# Patient Record
Sex: FEMALE | NOT HISPANIC OR LATINO | Employment: FULL TIME | URBAN - METROPOLITAN AREA
[De-identification: names, ages, dates, MRNs, and addresses within clinical notes are randomized per-mention and may not be internally consistent; named-entity substitution may affect disease eponyms.]

---

## 2021-05-04 ENCOUNTER — APPOINTMENT (OUTPATIENT)
Dept: URGENT CARE | Facility: CLINIC | Age: 41
End: 2021-05-04

## 2021-05-04 DIAGNOSIS — Z02.1 PHYSICAL EXAM, PRE-EMPLOYMENT: Primary | ICD-10-CM

## 2021-05-04 PROCEDURE — 86480 TB TEST CELL IMMUN MEASURE: CPT | Performed by: FAMILY MEDICINE

## 2021-05-06 LAB
GAMMA INTERFERON BACKGROUND BLD IA-ACNC: 0.02 IU/ML
M TB IFN-G BLD-IMP: NEGATIVE
M TB IFN-G CD4+ BCKGRND COR BLD-ACNC: 0 IU/ML
M TB IFN-G CD4+ BCKGRND COR BLD-ACNC: 0 IU/ML
MITOGEN IGNF BCKGRD COR BLD-ACNC: >10 IU/ML

## 2021-07-16 ENCOUNTER — OFFICE VISIT (OUTPATIENT)
Dept: FAMILY MEDICINE CLINIC | Facility: CLINIC | Age: 41
End: 2021-07-16
Payer: COMMERCIAL

## 2021-07-16 ENCOUNTER — APPOINTMENT (OUTPATIENT)
Dept: RADIOLOGY | Facility: CLINIC | Age: 41
End: 2021-07-16
Payer: COMMERCIAL

## 2021-07-16 VITALS
DIASTOLIC BLOOD PRESSURE: 80 MMHG | WEIGHT: 151.8 LBS | SYSTOLIC BLOOD PRESSURE: 96 MMHG | HEART RATE: 80 BPM | HEIGHT: 65 IN | TEMPERATURE: 98.9 F | BODY MASS INDEX: 25.29 KG/M2 | OXYGEN SATURATION: 99 %

## 2021-07-16 DIAGNOSIS — G89.29 CHRONIC LEFT SHOULDER PAIN: ICD-10-CM

## 2021-07-16 DIAGNOSIS — J30.2 SEASONAL ALLERGIES: ICD-10-CM

## 2021-07-16 DIAGNOSIS — M25.571 ACUTE RIGHT ANKLE PAIN: ICD-10-CM

## 2021-07-16 DIAGNOSIS — M25.512 CHRONIC LEFT SHOULDER PAIN: ICD-10-CM

## 2021-07-16 DIAGNOSIS — Z13.1 SCREENING FOR DIABETES MELLITUS: ICD-10-CM

## 2021-07-16 DIAGNOSIS — Z76.89 ENCOUNTER TO ESTABLISH CARE: ICD-10-CM

## 2021-07-16 DIAGNOSIS — M25.571 ACUTE RIGHT ANKLE PAIN: Primary | ICD-10-CM

## 2021-07-16 DIAGNOSIS — Z13.220 SCREENING FOR LIPID DISORDERS: ICD-10-CM

## 2021-07-16 PROCEDURE — 99203 OFFICE O/P NEW LOW 30 MIN: CPT | Performed by: NURSE PRACTITIONER

## 2021-07-16 PROCEDURE — 73030 X-RAY EXAM OF SHOULDER: CPT

## 2021-07-16 PROCEDURE — 73610 X-RAY EXAM OF ANKLE: CPT

## 2021-07-16 RX ORDER — IPRATROPIUM BROMIDE 21 UG/1
2 SPRAY, METERED NASAL EVERY 12 HOURS
COMMUNITY
End: 2021-07-16 | Stop reason: SDUPTHER

## 2021-07-16 RX ORDER — DIPHENHYDRAMINE HCL 25 MG
25 CAPSULE ORAL EVERY 6 HOURS PRN
COMMUNITY

## 2021-07-16 RX ORDER — IPRATROPIUM BROMIDE 21 UG/1
2 SPRAY, METERED NASAL EVERY 12 HOURS
Qty: 30 ML | Refills: 0 | Status: SHIPPED | OUTPATIENT
Start: 2021-07-16 | End: 2022-03-06 | Stop reason: SDUPTHER

## 2021-07-16 RX ORDER — IBUPROFEN 200 MG
TABLET ORAL EVERY 6 HOURS PRN
COMMUNITY

## 2021-07-19 NOTE — PROGRESS NOTES
[facility]    ASSESSMENT AND PLAN     1  Acute right ankle pain   patient with notable pain, along lateral aspect of right foot   No pain in the malleolus  Range of motion intact  Recommend referral/ evaluation with Podiatry  Will obtain x-ray prior, to rule out any structural concern  Note patient has been worked up by Rheumatology in [place] for various symptoms including joint pains  Discussed establishing here  Note mother was diagnosed with rheumatoid arthritis 1 year ago  Patient prefers to hold off at this time  Re-evaluate at next follow-up  - XR ankle 3+ vw right; Future  - Ambulatory referral to Podiatry; Future    2  Chronic left shoulder pain   with chronic nature of her symptoms, will assess with x-ray  Discussed would likely benefit from PT  Follow-up with ortho if indicated    - XR shoulder 2+ vw left; Future    3  Seasonal allergies    - ipratropium (ATROVENT) 0 03 % nasal spray; 2 sprays into each nostril every 12 (twelve) hours  Dispense: 30 mL; Refill: 0    4  Screening for lipid disorders   establish baseline    - Lipid panel; Future    5  Screening for diabetes mellitus    - Comprehensive metabolic panel; Future    6  Encounter to establish care  Referrals to establish with Gyn and Dermatology    - Ambulatory referral to Gynecology; Future  - Ambulatory referral to Dermatology; Future            SUBJECTIVE       Patient ID: Alivia May is a 47 y o  female  Chief Complaint   Patient presents with    Ankle Pain     right ankle/foot pain x 3 weeks    Shoulder Pain     left shoulder pain x 6 months       HISTORY OF PRESENT ILLNESS      Patient presents today to establish primary care in our practice  Transitioning from [facility] in [place]  She is currently an RN within our network, 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  Complains today of chronic left shoulder pain  Notes it for roughly 6 months  No known recent injury, but she does have a history of clavicle surgery in 2000 and again 2001, secondary to traumatic injury  Shoulder has been progressively worsening over the last few months  Believes she may have aggravated it when reaching for something behind her  Pain is primarily within the joint itself, and will occasionally radiate down the medial biceps  Notes decrease in range of motion  Second complaint today is right ankle pain  This is acute and has been present for roughly 3 weeks  Noted it after excessive walking while on vacation with her family  On the board walk at the [facility]  She normally does wear a good support shoe, but was wearing flip-flops the majority of her vacation  She has had plantar fasciitis in the past  This is different  Current pain is along the lateral aspect of her foot, and travels back up to the Achilles  Notes pain when applying pressure of walking  It is persistent  There is no change regardless of shoe (with/without support)  of note, she does have a history of joint pain  Recently worked up for bilateral hand/ wrist pain at [facility]  She has followed with a rheumatologist in the past for an elevated JANAK, but otherwise lab work has been unremarkable          The following portions of the patient's history were reviewed and updated as appropriate: allergies, current medications, past family history, past medical history, past social history, past surgical history and problem list     REVIEW OF SYSTEMS  Review of Systems   Constitutional: Negative  Respiratory: Negative  Cardiovascular: Negative  Musculoskeletal: Positive for arthralgias  Left shoulder and right foot as in HPI   Psychiatric/Behavioral: Negative          OBJECTIVE      VITAL SIGNS  BP 96/80 (BP Location: Left arm, Patient Position: Sitting, Cuff Size: Adult)   Pulse 80   Temp 98 9 °F (37 2 °C)   Ht 5' 5" (1 651 m)   Wt 68 9 kg (151 lb 12 8 oz)   SpO2 99%   BMI 25 26 kg/m² CURRENT MEDICATIONS    Current Outpatient Medications:     diphenhydrAMINE (BENADRYL) 25 mg capsule, Take 25 mg by mouth every 6 (six) hours as needed for itching, Disp: , Rfl:     ibuprofen (MOTRIN) 200 mg tablet, Take by mouth every 6 (six) hours as needed for mild pain, Disp: , Rfl:     ipratropium (ATROVENT) 0 03 % nasal spray, 2 sprays into each nostril every 12 (twelve) hours, Disp: 30 mL, Rfl: 0      PHYSICAL EXAMINATION   Physical Exam  Vitals and nursing note reviewed  Constitutional:       General: She is not in acute distress  Appearance: Normal appearance  She is not ill-appearing  Cardiovascular:      Rate and Rhythm: Normal rate and regular rhythm  Heart sounds: Normal heart sounds  Pulmonary:      Effort: Pulmonary effort is normal  No respiratory distress  Breath sounds: Normal breath sounds and air entry  Musculoskeletal:      Left shoulder: Tenderness and bony tenderness present  No swelling, deformity or effusion  Decreased range of motion  Normal pulse  Right lower leg: No edema  Left lower leg: No edema  Right foot: Normal range of motion and normal capillary refill  Swelling, tenderness (along lateral aspect) and bony tenderness present  Normal pulse  Skin:     General: Skin is warm and dry  Neurological:      General: No focal deficit present  Mental Status: She is alert and oriented to person, place, and time  Psychiatric:         Attention and Perception: Attention normal          Mood and Affect: Mood and affect normal          Speech: Speech normal          Behavior: Behavior normal          Thought Content:  Thought content normal

## 2021-09-01 ENCOUNTER — APPOINTMENT (OUTPATIENT)
Dept: LAB | Facility: HOSPITAL | Age: 41
End: 2021-09-01
Payer: COMMERCIAL

## 2021-09-01 ENCOUNTER — APPOINTMENT (OUTPATIENT)
Dept: LAB | Facility: HOSPITAL | Age: 41
End: 2021-09-01

## 2021-09-01 DIAGNOSIS — Z13.1 SCREENING FOR DIABETES MELLITUS: ICD-10-CM

## 2021-09-01 DIAGNOSIS — Z13.220 SCREENING FOR LIPID DISORDERS: ICD-10-CM

## 2021-09-01 DIAGNOSIS — Z00.8 HEALTH EXAMINATION IN POPULATION SURVEY: ICD-10-CM

## 2021-09-01 LAB
ALBUMIN SERPL BCP-MCNC: 3.9 G/DL (ref 3.5–5)
ALP SERPL-CCNC: 45 U/L (ref 46–116)
ALT SERPL W P-5'-P-CCNC: 17 U/L (ref 12–78)
ANION GAP SERPL CALCULATED.3IONS-SCNC: 1 MMOL/L (ref 4–13)
AST SERPL W P-5'-P-CCNC: 11 U/L (ref 5–45)
BILIRUB SERPL-MCNC: 0.53 MG/DL (ref 0.2–1)
BUN SERPL-MCNC: 9 MG/DL (ref 5–25)
CALCIUM SERPL-MCNC: 8.7 MG/DL (ref 8.3–10.1)
CHLORIDE SERPL-SCNC: 108 MMOL/L (ref 100–108)
CHOLEST SERPL-MCNC: 184 MG/DL (ref 50–200)
CO2 SERPL-SCNC: 30 MMOL/L (ref 21–32)
CREAT SERPL-MCNC: 0.61 MG/DL (ref 0.6–1.3)
EST. AVERAGE GLUCOSE BLD GHB EST-MCNC: 100 MG/DL
GFR SERPL CREATININE-BSD FRML MDRD: 115 ML/MIN/1.73SQ M
GLUCOSE P FAST SERPL-MCNC: 90 MG/DL (ref 65–99)
HBA1C MFR BLD: 5.1 %
HDLC SERPL-MCNC: 88 MG/DL
LDLC SERPL CALC-MCNC: 84 MG/DL (ref 0–100)
NONHDLC SERPL-MCNC: 96 MG/DL
POTASSIUM SERPL-SCNC: 4.2 MMOL/L (ref 3.5–5.3)
PROT SERPL-MCNC: 7.4 G/DL (ref 6.4–8.2)
SODIUM SERPL-SCNC: 139 MMOL/L (ref 136–145)
TRIGL SERPL-MCNC: 58 MG/DL

## 2021-09-01 PROCEDURE — 83036 HEMOGLOBIN GLYCOSYLATED A1C: CPT

## 2021-09-01 PROCEDURE — 80061 LIPID PANEL: CPT

## 2021-09-01 PROCEDURE — 36415 COLL VENOUS BLD VENIPUNCTURE: CPT

## 2021-09-01 PROCEDURE — 80053 COMPREHEN METABOLIC PANEL: CPT

## 2021-09-15 ENCOUNTER — EVALUATION (OUTPATIENT)
Dept: PHYSICAL THERAPY | Facility: CLINIC | Age: 41
End: 2021-09-15
Payer: COMMERCIAL

## 2021-09-15 ENCOUNTER — TELEPHONE (OUTPATIENT)
Dept: FAMILY MEDICINE CLINIC | Facility: CLINIC | Age: 41
End: 2021-09-15

## 2021-09-15 DIAGNOSIS — M25.512 CHRONIC LEFT SHOULDER PAIN: Primary | ICD-10-CM

## 2021-09-15 DIAGNOSIS — G89.29 CHRONIC LEFT SHOULDER PAIN: Primary | ICD-10-CM

## 2021-09-15 PROCEDURE — 97161 PT EVAL LOW COMPLEX 20 MIN: CPT | Performed by: PHYSICAL THERAPIST

## 2021-09-15 NOTE — PROGRESS NOTES
PT Evaluation     Today's date: 9/15/2021  Patient name: Dunia Adame  : 1980  MRN: [id number]  Referring provider: SWETA Locke  Dx:   Encounter Diagnosis     ICD-10-CM    1  Chronic left shoulder pain  M25 512     G89 29                   Assessment  Assessment details: Dunia Adame is a 47 y o  female referred to outpatient physical therapy for chronic L shoulder pain  Impairments include pain, decreased L shoulder ROM, GH capsular tightness, hypertonic infraspinatus, and decreased LUE strength  These impairments are limiting patients functional ability to perform lifting and reaching behind her back  Pt is restricted in participating in ADLs, and picking up and holding her children  Pt would benefit from skilled physical therapy to address the limitations above to allow return to prior level of function  At this point in time, no further referral necessary based upon examination results  Impairments: abnormal or restricted ROM, abnormal movement, activity intolerance, impaired physical strength, lacks appropriate home exercise program and pain with function    Symptom irritability: lowUnderstanding of Dx/Px/POC: good  Goals  Short term goals 2-3 weeks    1) Patient will demonstrate ability to perform HEP  2) Patient will improve LUE strength by 1/2 MMT  3) Patient will improve pain at worst to 3/10 on NPRS  Long term goals 4-8 weeks    1) Patient will demonstrate independence with comprehensive HEP  2) Patient improve FOTO score to equal or greater than expected values  3) Patient will demonstrate ability to  and hold her children without increase in pain  4) Patient will demonstrate ability to perform 5 pushups without increase in pain  5) Patient will decrease pain at worst to 0/10 to improve quality of life  Plan  Plan details: Plan of care was discussed with patient at time of evaluation  Pt will be seen 1-2x a week for 8 weeks     Patient would benefit from: skilled physical therapy  Planned therapy interventions: flexibility, functional ROM exercises, home exercise program, therapeutic exercise, therapeutic activities, stretching, strengthening, patient education, neuromuscular re-education, manual therapy and joint mobilization  Frequency: 2x week (1-2x)  Duration in weeks: 8  Treatment plan discussed with: patient        Subjective Evaluation    History of Present Illness  Mechanism of injury: Patient presents to outpatient physical therapy with chief complaint of L shoulder pain  Seven months she was reaching back and felt a pop or stretch and then got pain in the L shoulder  Pt reports pain in the shoulder once a week  Two previous L clavicle surgeries in  and   Patient Denies episodes of numbness or tingling in LUE  Pain described as sharp and radiates in L shoulder down to middle of sanchez that happens with movement  After the sharp pain the pain turns into burning pain that will last for 10 minutes  Pain rating currently 0/10, at best 0/10 and at worst 9/10  Pt reports she feels less muscle strength in the LUE  Patient states limitations with shoulder movement, reaching behind her bed, holding her children, lifting  Unable to do push ups  Pt is currently a [employer] and works full time; no difficulty with occupational tasks  Aggravating factors include reaching behind her back  Patient states use of rest for relief of symptoms  Pt goals for therapy include getting back to normal and improved strength  Quality of life: good    Pain  Current pain ratin  At best pain ratin  At worst pain ratin  Quality: sharp  Relieving factors: rest  Aggravating factors: lifting (reaching behind the back )  Progression: no change    Social Support    Employment status: working  Hand dominance: right      Diagnostic Tests    FCE comments: L shoulder XRAY on 21: There is no acute fracture or dislocation    There are postoperative changes of mid left clavicular plate and screw fixation for fracture repair      No significant degenerative changes      No lytic or blastic osseous lesion      Soft tissues are unremarkable    Treatments  No previous or current treatments  Patient Goals  Patient goals for therapy: decreased pain, increased motion, increased strength, independence with ADLs/IADLs and return to sport/leisure activities          Objective     Palpation   Left   Hypertonic in the infraspinatus, latissimus and upper trapezius  Tenderness of the infraspinatus and upper trapezius  Trigger point to infraspinatus and upper trapezius  Neurological Testing     Sensation   Cervical/Thoracic   Left   Intact: light touch    Right   Intact: light touch    Active Range of Motion   Cervical/Thoracic Spine       Cervical    Flexion:  WFL  Extension:  WFL  Left lateral flexion:  WFL  Right lateral flexion:  WFL  Left rotation:  WFL  Right rotation:  WFL  Left Shoulder   Flexion: 150 degrees   Abduction: WFL  External rotation 90°: 50 degrees   Internal rotation 90°: 80 degrees   Internal rotation BTB: T5     Right Shoulder   Normal active range of motion    Passive Range of Motion   Left Shoulder   External rotation 90°: 50 degrees   Internal rotation 90°: 80 degrees     Joint Play   Left Shoulder  Hypomobile in the anterior capsule, posterior capsule and inferior capsule    Joints within functional limits: C3, C4, C5, C6, C7, T1, T2, T3, T4, T5, T6, T7 and T8     Strength/Myotome Testing     Left Shoulder     Planes of Motion   Flexion: 4   Extension: 4+   Abduction: 4   External rotation at 0°: 4-   Internal rotation at 0°: 4     Isolated Muscles   Serratus anterior: 4+   Upper trapezius: 5     Right Shoulder     Planes of Motion   Flexion: 4+   Extension: 4+   Abduction: 4+   External rotation at 0°: 4   Internal rotation at 0°: 4     Isolated Muscles   Serratus anterior: 4+   Upper trapezius: 5     Left Elbow   Flexion: 4+  Extension: 4+    Right Elbow   Flexion: 4+  Extension: 4+    Left Wrist/Hand   Wrist extension: 4  Wrist flexion: 4  Thumb extension: 4+    Right Wrist/Hand   Wrist extension: 4  Wrist flexion: 4  Thumb extension: 4+    Tests   Cervical   Negative vertical compression  Left   Negative Spurling's Test A  Right   Negative Spurling's Test A  Left Shoulder   Positive Hawkin's and ULTT1  Negative belly press, crossover, drop arm, empty can, external rotation lag sign, full can, Neer's, painful arc, Speed's, ULTT3 and ULTT4  Lumbar   Negative vertical compression  Precautions: N/A      Manuals 9/15            L posterior capsule stretch  AG            L PROM             L P/A mobilizations             L median nerve glides             L infraspinatus STM             Neuro Re-Ed                                                                                                        Ther Ex             Pulleys flexion             L Sleeper stretch IR/ER  2x30" ea   HEP            SL ER  x10 HEP            Supine ER AAROM 5x10" HEP            Supine flexion AAROM             Table slides             ER on PB             Serratus anterior w/ ER             Scapular retractions w/ ER             Median nerve glides L             Ball circles                          Ther Activity                                       Gait Training                                       Modalities

## 2021-09-15 NOTE — TELEPHONE ENCOUNTER
Gareth from [facility] called  Asked if you would please place a referral for Roberta Warner  She is coming in [facility] to have L shoulder evaluated  She has chronic L shoulder pain

## 2021-09-21 ENCOUNTER — OFFICE VISIT (OUTPATIENT)
Dept: OBGYN CLINIC | Facility: CLINIC | Age: 41
End: 2021-09-21
Payer: COMMERCIAL

## 2021-09-21 VITALS
SYSTOLIC BLOOD PRESSURE: 120 MMHG | WEIGHT: 151.2 LBS | BODY MASS INDEX: 25.19 KG/M2 | DIASTOLIC BLOOD PRESSURE: 70 MMHG | HEIGHT: 65 IN

## 2021-09-21 DIAGNOSIS — Z01.419 ENCOUNTER FOR WELL WOMAN EXAM WITH ROUTINE GYNECOLOGICAL EXAM: Primary | ICD-10-CM

## 2021-09-21 DIAGNOSIS — Z12.31 ENCOUNTER FOR SCREENING MAMMOGRAM FOR MALIGNANT NEOPLASM OF BREAST: ICD-10-CM

## 2021-09-21 DIAGNOSIS — Z76.89 ENCOUNTER TO ESTABLISH CARE: ICD-10-CM

## 2021-09-21 PROBLEM — Z90.79 STATUS POST BILATERAL SALPINGECTOMY: Status: ACTIVE | Noted: 2018-04-18

## 2021-09-21 PROBLEM — Z98.891 HISTORY OF UTERINE SCAR FROM PREVIOUS SURGERY: Status: ACTIVE | Noted: 2017-09-18

## 2021-09-21 PROCEDURE — 99385 PREV VISIT NEW AGE 18-39: CPT | Performed by: OBSTETRICS & GYNECOLOGY

## 2021-09-21 PROCEDURE — G0145 SCR C/V CYTO,THINLAYER,RESCR: HCPCS | Performed by: OBSTETRICS & GYNECOLOGY

## 2021-09-21 NOTE — PROGRESS NOTES
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, history of HPV  - Contraceptive counseling discussed  Current contraception: BS done with prior CS     Additional problems addressed during this visit:  1  Encounter for well woman exam with routine gynecological exam    2  Encounter to establish care  -     Ambulatory referral to Gynecology      CC:  Annual Gynecologic Examination    HPI: Samantha Craft is a 46 y o  [id number] who presents for annual gynecologic examination  HPI  She reports no new changes to her health  She reports no breast concerns  Mother had history of breast cancer, diagnosed at age 36, has been getting annual mammos since age 30  She gets regular periods  She has no vaginal discharge, vulvar or vaginal lesions, pelvic pain, or abnormal bleeding  She has no sexual health concerns and is currently sexually active with one male partner  The following portions of the patient's history were reviewed and updated as appropriate: allergies, current medications, past family history, past medical history, obstetric history, gynecologic history, past social history, past surgical history and problem list     Review of Systems   Constitutional: Negative  HENT: Negative  Eyes: Negative  Respiratory: Negative  Cardiovascular: Negative  Gastrointestinal: Negative  Genitourinary: Negative  Musculoskeletal: Negative  All other systems reviewed and are negative  Objective:  /70   Ht 5' 5" (1 651 m)   Wt 68 6 kg (151 lb 3 2 oz)   LMP 09/14/2021   Breastfeeding No   BMI 25 16 kg/m²    Physical Exam  Vitals reviewed     Constitutional:       General: She is not in acute distress  Appearance: She is well-developed  HENT:      Head: Normocephalic and atraumatic  Nose: Nose normal    Cardiovascular:      Rate and Rhythm: Normal rate  Pulmonary:      Effort: Pulmonary effort is normal  No respiratory distress  Chest:      Breasts: Breasts are symmetrical          Right: Normal  No mass, nipple discharge, skin change or tenderness  Left: Normal  No mass, nipple discharge, skin change or tenderness  Abdominal:      General: There is no distension  Palpations: Abdomen is soft  There is no mass  Tenderness: There is no abdominal tenderness  There is no guarding or rebound  Genitourinary:     General: Normal vulva  Exam position: Lithotomy position  Labia:         Right: No lesion  Left: No lesion  Urethra: No prolapse (urethral meatus normal)  Vagina: Normal  No vaginal discharge, erythema or bleeding  Cervix: Normal       Uterus: Normal        Adnexa: Right adnexa normal and left adnexa normal    Musculoskeletal:         General: Normal range of motion  Cervical back: Normal range of motion  Lymphadenopathy:      Upper Body:      Right upper body: No supraclavicular, axillary or pectoral adenopathy  Left upper body: No supraclavicular, axillary or pectoral adenopathy  Lower Body: No right inguinal adenopathy  No left inguinal adenopathy  Skin:     General: Skin is warm and dry  Neurological:      Mental Status: She is alert and oriented to person, place, and time  Psychiatric:         Behavior: Behavior normal          Thought Content:  Thought content normal          Judgment: Judgment normal

## 2021-09-23 ENCOUNTER — OFFICE VISIT (OUTPATIENT)
Dept: PHYSICAL THERAPY | Facility: CLINIC | Age: 41
End: 2021-09-23
Payer: COMMERCIAL

## 2021-09-23 DIAGNOSIS — M25.512 CHRONIC LEFT SHOULDER PAIN: Primary | ICD-10-CM

## 2021-09-23 DIAGNOSIS — G89.29 CHRONIC LEFT SHOULDER PAIN: Primary | ICD-10-CM

## 2021-09-23 PROCEDURE — 97140 MANUAL THERAPY 1/> REGIONS: CPT | Performed by: PHYSICAL THERAPIST

## 2021-09-23 PROCEDURE — 97110 THERAPEUTIC EXERCISES: CPT | Performed by: PHYSICAL THERAPIST

## 2021-09-23 NOTE — PROGRESS NOTES
Daily Note     Today's date: 2021  Patient name: Milan Cantu  : 1980  MRN: [id number]  Referring provider: SWETA Nava  Dx:   Encounter Diagnosis     ICD-10-CM    1  Chronic left shoulder pain  M25 512     G89 29                   Subjective: Pt reports her shoulder continues to feel the same since initial evaluation  States she was not able to do her exercises as much as she was hoping at home due to the kids  Objective: See treatment diary below  L shoulder ER PROM: 65*    Assessment: Demonstrates significant improvements in L shoulder PROM specifically into ER>IR as shown above  Numbness and tingling present at end ranges of manual stretching  Demonstrates neural tension in median nerve however increased ROM before symptom provocation by end of 3rd set  Good tolerance to additional exercises this session without symptom provocation  HEP reviewed and all questions answered at this time  Pt educated to maintain compliance with HEP  Plan: Continue per plan of care  Precautions: N/A      Manuals 9/15 9/23           L posterior capsule stretch  AG 3x30"           L PROM  AG           L P/A mobilizations             L median nerve glides  3x10            L infraspinatus STM             Neuro Re-Ed                                                                                                        Ther Ex             Pulleys flexion  3'            L Sleeper stretch IR/ER  2x30" ea  HEP IR/ER  2x30" ea              SL ER  x10 HEP 2x10  1#          Supine ER AAROM 5x10" HEP 5x10"           Supine flexion AAROM             Table slides             ER on PB             Serratus anterior w/ ER  OTB  x10           Scapular retractions w/ ER  PTB  x10           Median nerve glides L  x10           Ball circles                          Ther Activity                                       Gait Training                                       Modalities

## 2021-09-28 LAB
LAB AP GYN PRIMARY INTERPRETATION: NORMAL
Lab: NORMAL

## 2021-09-29 ENCOUNTER — APPOINTMENT (OUTPATIENT)
Dept: PHYSICAL THERAPY | Facility: CLINIC | Age: 41
End: 2021-09-29
Payer: COMMERCIAL

## 2021-10-06 ENCOUNTER — APPOINTMENT (OUTPATIENT)
Dept: PHYSICAL THERAPY | Facility: CLINIC | Age: 41
End: 2021-10-06
Payer: COMMERCIAL

## 2021-10-07 ENCOUNTER — NURSE TRIAGE (OUTPATIENT)
Dept: OTHER | Facility: OTHER | Age: 41
End: 2021-10-07

## 2021-10-07 DIAGNOSIS — Z20.828 SARS-ASSOCIATED CORONAVIRUS EXPOSURE: Primary | ICD-10-CM

## 2021-10-07 PROCEDURE — 87635 SARS-COV-2 COVID-19 AMP PRB: CPT | Performed by: NURSE PRACTITIONER

## 2021-10-11 ENCOUNTER — TELEPHONE (OUTPATIENT)
Dept: FAMILY MEDICINE CLINIC | Facility: CLINIC | Age: 41
End: 2021-10-11

## 2021-10-14 ENCOUNTER — APPOINTMENT (OUTPATIENT)
Dept: PHYSICAL THERAPY | Facility: CLINIC | Age: 41
End: 2021-10-14
Payer: COMMERCIAL

## 2021-10-20 ENCOUNTER — OFFICE VISIT (OUTPATIENT)
Dept: PHYSICAL THERAPY | Facility: CLINIC | Age: 41
End: 2021-10-20
Payer: COMMERCIAL

## 2021-10-20 DIAGNOSIS — G89.29 CHRONIC LEFT SHOULDER PAIN: Primary | ICD-10-CM

## 2021-10-20 DIAGNOSIS — M25.512 CHRONIC LEFT SHOULDER PAIN: Primary | ICD-10-CM

## 2021-10-20 PROCEDURE — 97110 THERAPEUTIC EXERCISES: CPT | Performed by: PHYSICAL THERAPIST

## 2021-10-20 PROCEDURE — 97140 MANUAL THERAPY 1/> REGIONS: CPT | Performed by: PHYSICAL THERAPIST

## 2021-10-28 ENCOUNTER — OFFICE VISIT (OUTPATIENT)
Dept: PHYSICAL THERAPY | Facility: CLINIC | Age: 41
End: 2021-10-28
Payer: COMMERCIAL

## 2021-10-28 DIAGNOSIS — G89.29 CHRONIC LEFT SHOULDER PAIN: Primary | ICD-10-CM

## 2021-10-28 DIAGNOSIS — M25.512 CHRONIC LEFT SHOULDER PAIN: Primary | ICD-10-CM

## 2021-10-28 PROCEDURE — 97110 THERAPEUTIC EXERCISES: CPT

## 2021-10-28 PROCEDURE — 97140 MANUAL THERAPY 1/> REGIONS: CPT

## 2021-11-01 ENCOUNTER — OFFICE VISIT (OUTPATIENT)
Dept: OBGYN CLINIC | Facility: HOSPITAL | Age: 41
End: 2021-11-01
Payer: COMMERCIAL

## 2021-11-01 VITALS
HEART RATE: 69 BPM | SYSTOLIC BLOOD PRESSURE: 118 MMHG | WEIGHT: 148.6 LBS | BODY MASS INDEX: 24.76 KG/M2 | DIASTOLIC BLOOD PRESSURE: 79 MMHG | HEIGHT: 65 IN

## 2021-11-01 DIAGNOSIS — M79.641 PAIN IN BOTH HANDS: ICD-10-CM

## 2021-11-01 DIAGNOSIS — M18.0 OSTEOARTHRITIS OF CARPOMETACARPAL (CMC) JOINTS OF BOTH THUMBS, UNSPECIFIED OSTEOARTHRITIS TYPE: Primary | ICD-10-CM

## 2021-11-01 DIAGNOSIS — M79.642 PAIN IN BOTH HANDS: ICD-10-CM

## 2021-11-01 DIAGNOSIS — M18.0 PRIMARY OSTEOARTHRITIS OF BOTH FIRST CARPOMETACARPAL JOINTS: ICD-10-CM

## 2021-11-01 PROCEDURE — 99203 OFFICE O/P NEW LOW 30 MIN: CPT | Performed by: PHYSICIAN ASSISTANT

## 2021-11-03 ENCOUNTER — OFFICE VISIT (OUTPATIENT)
Dept: PHYSICAL THERAPY | Facility: CLINIC | Age: 41
End: 2021-11-03
Payer: COMMERCIAL

## 2021-11-03 DIAGNOSIS — M25.512 CHRONIC LEFT SHOULDER PAIN: Primary | ICD-10-CM

## 2021-11-03 DIAGNOSIS — G89.29 CHRONIC LEFT SHOULDER PAIN: Primary | ICD-10-CM

## 2021-11-03 PROCEDURE — 97110 THERAPEUTIC EXERCISES: CPT

## 2021-11-03 PROCEDURE — 97140 MANUAL THERAPY 1/> REGIONS: CPT

## 2021-11-04 ENCOUNTER — OFFICE VISIT (OUTPATIENT)
Dept: PHYSICAL THERAPY | Facility: CLINIC | Age: 41
End: 2021-11-04
Payer: COMMERCIAL

## 2021-11-04 DIAGNOSIS — G89.29 CHRONIC LEFT SHOULDER PAIN: Primary | ICD-10-CM

## 2021-11-04 DIAGNOSIS — M25.512 CHRONIC LEFT SHOULDER PAIN: Primary | ICD-10-CM

## 2021-11-04 PROCEDURE — 97110 THERAPEUTIC EXERCISES: CPT | Performed by: PHYSICAL THERAPIST

## 2021-11-04 PROCEDURE — 97140 MANUAL THERAPY 1/> REGIONS: CPT | Performed by: PHYSICAL THERAPIST

## 2021-11-15 ENCOUNTER — OFFICE VISIT (OUTPATIENT)
Dept: PHYSICAL THERAPY | Facility: CLINIC | Age: 41
End: 2021-11-15
Payer: COMMERCIAL

## 2021-11-15 DIAGNOSIS — M25.512 CHRONIC LEFT SHOULDER PAIN: Primary | ICD-10-CM

## 2021-11-15 DIAGNOSIS — G89.29 CHRONIC LEFT SHOULDER PAIN: Primary | ICD-10-CM

## 2021-11-15 PROCEDURE — 97140 MANUAL THERAPY 1/> REGIONS: CPT

## 2021-11-15 PROCEDURE — 97110 THERAPEUTIC EXERCISES: CPT

## 2021-11-17 ENCOUNTER — APPOINTMENT (OUTPATIENT)
Dept: PHYSICAL THERAPY | Facility: CLINIC | Age: 41
End: 2021-11-17
Payer: COMMERCIAL

## 2021-11-18 ENCOUNTER — OFFICE VISIT (OUTPATIENT)
Dept: PHYSICAL THERAPY | Facility: CLINIC | Age: 41
End: 2021-11-18
Payer: COMMERCIAL

## 2021-11-18 DIAGNOSIS — G89.29 CHRONIC LEFT SHOULDER PAIN: Primary | ICD-10-CM

## 2021-11-18 DIAGNOSIS — M25.512 CHRONIC LEFT SHOULDER PAIN: Primary | ICD-10-CM

## 2021-11-18 PROCEDURE — 97140 MANUAL THERAPY 1/> REGIONS: CPT | Performed by: PHYSICAL THERAPIST

## 2021-11-18 PROCEDURE — 97110 THERAPEUTIC EXERCISES: CPT | Performed by: PHYSICAL THERAPIST

## 2021-11-22 ENCOUNTER — OFFICE VISIT (OUTPATIENT)
Dept: PHYSICAL THERAPY | Facility: CLINIC | Age: 41
End: 2021-11-22
Payer: COMMERCIAL

## 2021-11-22 DIAGNOSIS — G89.29 CHRONIC LEFT SHOULDER PAIN: Primary | ICD-10-CM

## 2021-11-22 DIAGNOSIS — M25.512 CHRONIC LEFT SHOULDER PAIN: Primary | ICD-10-CM

## 2021-11-22 PROCEDURE — 97110 THERAPEUTIC EXERCISES: CPT | Performed by: PHYSICAL THERAPIST

## 2021-11-22 PROCEDURE — 97140 MANUAL THERAPY 1/> REGIONS: CPT | Performed by: PHYSICAL THERAPIST

## 2021-11-24 ENCOUNTER — OFFICE VISIT (OUTPATIENT)
Dept: PHYSICAL THERAPY | Facility: CLINIC | Age: 41
End: 2021-11-24
Payer: COMMERCIAL

## 2021-11-24 DIAGNOSIS — G89.29 CHRONIC LEFT SHOULDER PAIN: Primary | ICD-10-CM

## 2021-11-24 DIAGNOSIS — M25.512 CHRONIC LEFT SHOULDER PAIN: Primary | ICD-10-CM

## 2021-11-24 PROCEDURE — 97110 THERAPEUTIC EXERCISES: CPT | Performed by: PHYSICAL THERAPIST

## 2021-11-24 PROCEDURE — 97140 MANUAL THERAPY 1/> REGIONS: CPT | Performed by: PHYSICAL THERAPIST

## 2021-11-24 PROCEDURE — 97112 NEUROMUSCULAR REEDUCATION: CPT | Performed by: PHYSICAL THERAPIST

## 2021-11-27 ENCOUNTER — TELEPHONE (OUTPATIENT)
Dept: FAMILY MEDICINE CLINIC | Facility: CLINIC | Age: 41
End: 2021-11-27

## 2021-11-29 ENCOUNTER — OFFICE VISIT (OUTPATIENT)
Dept: PHYSICAL THERAPY | Facility: CLINIC | Age: 41
End: 2021-11-29
Payer: COMMERCIAL

## 2021-11-29 DIAGNOSIS — G89.29 CHRONIC LEFT SHOULDER PAIN: Primary | ICD-10-CM

## 2021-11-29 DIAGNOSIS — M25.512 CHRONIC LEFT SHOULDER PAIN: Primary | ICD-10-CM

## 2021-11-29 PROCEDURE — 97112 NEUROMUSCULAR REEDUCATION: CPT | Performed by: PHYSICAL THERAPIST

## 2021-11-29 PROCEDURE — 97140 MANUAL THERAPY 1/> REGIONS: CPT | Performed by: PHYSICAL THERAPIST

## 2021-11-29 PROCEDURE — 97110 THERAPEUTIC EXERCISES: CPT | Performed by: PHYSICAL THERAPIST

## 2021-12-01 ENCOUNTER — TELEPHONE (OUTPATIENT)
Dept: FAMILY MEDICINE CLINIC | Facility: CLINIC | Age: 41
End: 2021-12-01

## 2021-12-01 ENCOUNTER — APPOINTMENT (OUTPATIENT)
Dept: PHYSICAL THERAPY | Facility: CLINIC | Age: 41
End: 2021-12-01
Payer: COMMERCIAL

## 2021-12-06 ENCOUNTER — OFFICE VISIT (OUTPATIENT)
Dept: PHYSICAL THERAPY | Facility: CLINIC | Age: 41
End: 2021-12-06
Payer: COMMERCIAL

## 2021-12-06 DIAGNOSIS — M25.512 CHRONIC LEFT SHOULDER PAIN: Primary | ICD-10-CM

## 2021-12-06 DIAGNOSIS — G89.29 CHRONIC LEFT SHOULDER PAIN: Primary | ICD-10-CM

## 2021-12-06 PROCEDURE — 97140 MANUAL THERAPY 1/> REGIONS: CPT

## 2021-12-06 PROCEDURE — 97110 THERAPEUTIC EXERCISES: CPT

## 2021-12-09 ENCOUNTER — APPOINTMENT (OUTPATIENT)
Dept: PHYSICAL THERAPY | Facility: CLINIC | Age: 41
End: 2021-12-09
Payer: COMMERCIAL

## 2021-12-13 ENCOUNTER — CONSULT (OUTPATIENT)
Dept: DERMATOLOGY | Facility: CLINIC | Age: 41
End: 2021-12-13
Payer: COMMERCIAL

## 2021-12-13 ENCOUNTER — TELEMEDICINE (OUTPATIENT)
Dept: FAMILY MEDICINE CLINIC | Facility: CLINIC | Age: 41
End: 2021-12-13
Payer: COMMERCIAL

## 2021-12-13 VITALS — BODY MASS INDEX: 24.76 KG/M2 | TEMPERATURE: 98.6 F | WEIGHT: 148.59 LBS | HEIGHT: 65 IN

## 2021-12-13 DIAGNOSIS — F41.8 DEPRESSION WITH ANXIETY: Primary | ICD-10-CM

## 2021-12-13 DIAGNOSIS — Z76.89 ENCOUNTER TO ESTABLISH CARE: Primary | ICD-10-CM

## 2021-12-13 DIAGNOSIS — Z41.1 ENCOUNTER FOR COSMETIC PROCEDURE: ICD-10-CM

## 2021-12-13 PROCEDURE — 99243 OFF/OP CNSLTJ NEW/EST LOW 30: CPT | Performed by: DERMATOLOGY

## 2021-12-13 PROCEDURE — 99214 OFFICE O/P EST MOD 30 MIN: CPT | Performed by: NURSE PRACTITIONER

## 2021-12-13 RX ORDER — SERTRALINE HYDROCHLORIDE 25 MG/1
25 TABLET, FILM COATED ORAL DAILY
Qty: 90 TABLET | Refills: 0 | Status: SHIPPED | OUTPATIENT
Start: 2021-12-13 | End: 2022-01-03 | Stop reason: ALTCHOICE

## 2021-12-15 ENCOUNTER — APPOINTMENT (OUTPATIENT)
Dept: PHYSICAL THERAPY | Facility: CLINIC | Age: 41
End: 2021-12-15
Payer: COMMERCIAL

## 2021-12-15 RX ORDER — TRETINOIN 0.2 MG/G
CREAM TOPICAL
Qty: 44 G | Refills: 0 | Status: SHIPPED | OUTPATIENT
Start: 2021-12-15 | End: 2022-03-22

## 2021-12-20 ENCOUNTER — APPOINTMENT (OUTPATIENT)
Dept: PHYSICAL THERAPY | Facility: CLINIC | Age: 41
End: 2021-12-20
Payer: COMMERCIAL

## 2021-12-23 ENCOUNTER — OFFICE VISIT (OUTPATIENT)
Dept: PHYSICAL THERAPY | Facility: CLINIC | Age: 41
End: 2021-12-23
Payer: COMMERCIAL

## 2021-12-23 DIAGNOSIS — M25.512 CHRONIC LEFT SHOULDER PAIN: Primary | ICD-10-CM

## 2021-12-23 DIAGNOSIS — G89.29 CHRONIC LEFT SHOULDER PAIN: Primary | ICD-10-CM

## 2021-12-23 PROCEDURE — 97110 THERAPEUTIC EXERCISES: CPT | Performed by: PHYSICAL THERAPIST

## 2021-12-23 PROCEDURE — 97140 MANUAL THERAPY 1/> REGIONS: CPT | Performed by: PHYSICAL THERAPIST

## 2021-12-27 ENCOUNTER — APPOINTMENT (OUTPATIENT)
Dept: PHYSICAL THERAPY | Facility: CLINIC | Age: 41
End: 2021-12-27
Payer: COMMERCIAL

## 2021-12-27 ENCOUNTER — OFFICE VISIT (OUTPATIENT)
Dept: OBGYN CLINIC | Facility: OTHER | Age: 41
End: 2021-12-27
Payer: COMMERCIAL

## 2021-12-27 VITALS
DIASTOLIC BLOOD PRESSURE: 89 MMHG | HEIGHT: 65 IN | BODY MASS INDEX: 24.36 KG/M2 | HEART RATE: 78 BPM | WEIGHT: 146.2 LBS | SYSTOLIC BLOOD PRESSURE: 119 MMHG

## 2021-12-27 DIAGNOSIS — M25.531 BILATERAL WRIST PAIN: Primary | ICD-10-CM

## 2021-12-27 DIAGNOSIS — M25.532 BILATERAL WRIST PAIN: Primary | ICD-10-CM

## 2021-12-27 PROCEDURE — 99214 OFFICE O/P EST MOD 30 MIN: CPT | Performed by: ORTHOPAEDIC SURGERY

## 2021-12-27 RX ORDER — METHYLPREDNISOLONE 4 MG/1
TABLET ORAL
Qty: 1 EACH | Refills: 0 | Status: SHIPPED | OUTPATIENT
Start: 2021-12-27 | End: 2022-03-22

## 2021-12-30 ENCOUNTER — TELEPHONE (OUTPATIENT)
Dept: OBGYN CLINIC | Facility: HOSPITAL | Age: 41
End: 2021-12-30

## 2021-12-30 ENCOUNTER — TELEPHONE (OUTPATIENT)
Dept: FAMILY MEDICINE CLINIC | Facility: CLINIC | Age: 41
End: 2021-12-30

## 2021-12-30 NOTE — TELEPHONE ENCOUNTER
Called pt to confirm 1/03 appt, then pt said she hadn't heard back about her Zoloft concern  Almost went to ER last night due to the reflux and needs to know how to taper down the medication

## 2021-12-30 NOTE — TELEPHONE ENCOUNTER
Patient called this morning, is experiencing severe heartburn  She believes this is from her Zoloft  I did schedule an appt  For her on 01/03  She would like to know if she can taper off these meds  Until she sees you on Monday

## 2022-01-03 ENCOUNTER — TELEMEDICINE (OUTPATIENT)
Dept: FAMILY MEDICINE CLINIC | Facility: CLINIC | Age: 42
End: 2022-01-03
Payer: COMMERCIAL

## 2022-01-03 DIAGNOSIS — F41.8 DEPRESSION WITH ANXIETY: ICD-10-CM

## 2022-01-03 PROCEDURE — 99213 OFFICE O/P EST LOW 20 MIN: CPT | Performed by: NURSE PRACTITIONER

## 2022-01-03 RX ORDER — ESCITALOPRAM OXALATE 5 MG/1
5 TABLET ORAL DAILY
Qty: 30 TABLET | Refills: 1 | Status: SHIPPED | OUTPATIENT
Start: 2022-01-03 | End: 2022-02-03 | Stop reason: DRUGHIGH

## 2022-01-03 NOTE — PROGRESS NOTES
Virtual Brief Visit    Patient is located in the following state in which I hold an active license [place]      Assessment/Plan:  Symptoms discussed at length  Uncertain if recent reflux symptoms were correlated to Sertraline, but will discontinue at this time  We will trial Lexapro  Note her sister is currently taking Lexapro without issue  Will start low dose at 5 mg  Discussed would likely need to increase for best therapeutic result  F/U 4 weeks  Sooner with any problems/concerns  Referral remains open for behavioral health  Pt states she did miss a call from the department, but she has not called back as of yet  Encouraged follow up, as I believe counseling will be helpful    HPI: presents today to discuss negative effects since starting Sertraline the middle of December  Noted increased fatigue - so she was taking it at night  Notes she would experience nausea in the am upon waking since changing the time  Most recently also noted reflux symptoms  Worse at HS  Few days ago, she experienced epigastric burning so bad,  that  radiated to her shoulders  She is still taking the Sertraline today, as she did not get My chart message several days ago  to discontinue  Problem List Items Addressed This Visit     None      Visit Diagnoses     Depression with anxiety        Relevant Medications    escitalopram (LEXAPRO) 5 mg tablet          Recent Visits  Date Type Provider Dept   12/30/21 Telephone [facility]   Showing recent visits within past 7 days and meeting all other requirements  Today's Visits  Date Type Provider Dept   01/03/22 Telemedicine [facility] [id number] [address] today's visits and meeting all other requirements  Future Appointments  No visits were found meeting these conditions    Showing future appointments within next 150 days and meeting all other requirements         I spent 15 minutes directly with the patient during this visit

## 2022-01-21 ENCOUNTER — TELEPHONE (OUTPATIENT)
Dept: OBGYN CLINIC | Facility: OTHER | Age: 42
End: 2022-01-21

## 2022-01-21 NOTE — TELEPHONE ENCOUNTER
----- Message from Fred Escobar MD sent at 1/21/2022  1:22 PM EST -----  Regarding: FW: Thumb pain  We can consider doing carpal tunnel injection with ultrasound from a diagnostic standpoint  Please contact and schedule the patient in this regard  I would only suggest injecting the more symptomatic wrist at this time  ----- Message -----  From: Ria Kam  Sent: 1/20/2022   4:12 PM EST  To: Fred Escobar MD  Subject: FW: Thumb pain                                     ----- Message -----  From: Dave Walton  Sent: 1/14/2022   2:00 PM EST  To: [facility]  Subject: Thumb pain                                       Good afternoon! I wanted to follow up as the steroid pack has been complete and did nothing as far as the zings and pain  Didnt notice anything actually  They couldnt get me scheduled until another month from now for further testing  Should we be scheduling injection at this point? Its been a really long waiting game and still painful and weak     Thanks, Norleen Hodgkin

## 2022-01-24 ENCOUNTER — IMMUNIZATIONS (OUTPATIENT)
Dept: FAMILY MEDICINE CLINIC | Facility: HOSPITAL | Age: 42
End: 2022-01-24

## 2022-01-24 DIAGNOSIS — Z23 ENCOUNTER FOR IMMUNIZATION: Primary | ICD-10-CM

## 2022-01-24 PROCEDURE — 0001A COVID-19 PFIZER VACC 0.3 ML: CPT

## 2022-01-24 PROCEDURE — 91300 COVID-19 PFIZER VACC 0.3 ML: CPT

## 2022-02-14 ENCOUNTER — HOSPITAL ENCOUNTER (OUTPATIENT)
Dept: NEUROLOGY | Facility: CLINIC | Age: 42
Discharge: HOME | End: 2022-02-14
Payer: COMMERCIAL

## 2022-02-14 DIAGNOSIS — M25.532 BILATERAL WRIST PAIN: ICD-10-CM

## 2022-02-14 DIAGNOSIS — M25.531 BILATERAL WRIST PAIN: ICD-10-CM

## 2022-02-14 PROCEDURE — 95912 NRV CNDJ TEST 11-12 STUDIES: CPT | Performed by: PSYCHIATRY & NEUROLOGY

## 2022-02-14 PROCEDURE — 95886 MUSC TEST DONE W/N TEST COMP: CPT | Performed by: PSYCHIATRY & NEUROLOGY

## 2022-03-02 ENCOUNTER — OFFICE VISIT (OUTPATIENT)
Dept: OBGYN CLINIC | Facility: OTHER | Age: 42
End: 2022-03-02
Payer: COMMERCIAL

## 2022-03-02 VITALS
HEART RATE: 72 BPM | DIASTOLIC BLOOD PRESSURE: 76 MMHG | SYSTOLIC BLOOD PRESSURE: 113 MMHG | BODY MASS INDEX: 24.86 KG/M2 | WEIGHT: 149.2 LBS | HEIGHT: 65 IN

## 2022-03-02 DIAGNOSIS — M65.4 TENDINITIS, DE QUERVAIN'S: Primary | ICD-10-CM

## 2022-03-02 DIAGNOSIS — M25.532 PAIN IN LEFT WRIST: ICD-10-CM

## 2022-03-02 PROCEDURE — 20550 NJX 1 TENDON SHEATH/LIGAMENT: CPT | Performed by: ORTHOPAEDIC SURGERY

## 2022-03-02 PROCEDURE — 76942 ECHO GUIDE FOR BIOPSY: CPT | Performed by: ORTHOPAEDIC SURGERY

## 2022-03-02 PROCEDURE — 99214 OFFICE O/P EST MOD 30 MIN: CPT | Performed by: ORTHOPAEDIC SURGERY

## 2022-03-02 RX ORDER — LIDOCAINE HYDROCHLORIDE 10 MG/ML
0.5 INJECTION, SOLUTION INFILTRATION; PERINEURAL
Status: COMPLETED | OUTPATIENT
Start: 2022-03-02 | End: 2022-03-02

## 2022-03-02 RX ORDER — BETAMETHASONE SODIUM PHOSPHATE AND BETAMETHASONE ACETATE 3; 3 MG/ML; MG/ML
3 INJECTION, SUSPENSION INTRA-ARTICULAR; INTRALESIONAL; INTRAMUSCULAR; SOFT TISSUE
Status: COMPLETED | OUTPATIENT
Start: 2022-03-02 | End: 2022-03-02

## 2022-03-02 RX ADMIN — BETAMETHASONE SODIUM PHOSPHATE AND BETAMETHASONE ACETATE 3 MG: 3; 3 INJECTION, SUSPENSION INTRA-ARTICULAR; INTRALESIONAL; INTRAMUSCULAR; SOFT TISSUE at 09:43

## 2022-03-02 RX ADMIN — LIDOCAINE HYDROCHLORIDE 0.5 ML: 10 INJECTION, SOLUTION INFILTRATION; PERINEURAL at 09:43

## 2022-03-02 NOTE — PROGRESS NOTES
Assessment:       1  Tendinitis, de Quervain's    2  Pain in left wrist          Plan:        Explained my current clinical findings to Ricci Cortez and also discussed her recent EMG findings  Currently, her most tender spot on the left wrist exam was the 1st extensor compartment  Given this clinical finding she was agreeable to performance of a diagnostic/therapeutic left 1st extensor compartment cortisone injection  This was performed on today's office visit without any immediate complications using ultrasound guidance  I have advised her to continue using the thumb spica brace and activity modification  Will reassess in about 4 weeks time  If she does find relief with the left wrist pain we could consider doing the right wrist 1st dorsal compartment injection as well  Subjective:     Patient ID: Felisha Bowman is a 46 y o  female  Chief Complaint:  Left wrist pain    HPI  Ricci Cortez is here today for a follow-up of her bilateral wrist pain  She has had longstanding history of bilateral wrist pain that radiates to her fingers  She was last seen in this regard on 1/26/2022  Prior to that, she has had treatment done through bilateral thumb spica braces which did not improve her symptoms  Her clinical exam was suspicious for potential carpal tunnel syndrome but office based limited carpal tunnel ultrasound did not reveal any definitive finding suggestive of carpal tunnel syndrome  The median nerve cross-section diameter was borderline high in the right wrist   However, the patient mentions that her symptoms are worse on the left side  Subsequently, she also underwent an EMG study of bilateral upper extremities on 2/14/2022 and this has not revealed any evidence of carpal tunnel syndrome or other peripheral neuropathy  Additionally, the patient has also tried a course of oral steroids without much benefit    Today, she reports that the left wrist pain is worse the right and she points to her left thumb as the site of most discomfort  She does mention that the pain is sharp and sometimes radiates to her radial aspect of the wrist   Symptoms are similar on the right side but to a lesser extent  The patient has had radiographs of her wrists at an external facility for which images are not available right now for review  However, she does mention that these x-rays were within normal limits  She has also been evaluated by Rheumatology in the past and reports that no rheumatological issue was found in the past      Social History     Occupational History    Not on file   Tobacco Use    Smoking status: Never Smoker    Smokeless tobacco: Never Used   Vaping Use    Vaping Use: Never used   Substance and Sexual Activity    Alcohol use: Yes     Alcohol/week: 7 0 standard drinks     Types: 7 Glasses of wine per week     Comment: 1 glass of wine per night    Drug use: Not Currently    Sexual activity: Yes      Review of Systems        Objective:     Ortho ExamPhysical Exam  Vitals and nursing note reviewed  Constitutional:       Appearance: She is well-developed  HENT:      Head: Normocephalic and atraumatic  Eyes:      Conjunctiva/sclera: Conjunctivae normal       Pupils: Pupils are equal, round, and reactive to light  Cardiovascular:      Rate and Rhythm: Normal rate and regular rhythm  Pulmonary:      Effort: Pulmonary effort is normal  No respiratory distress  Skin:     General: Skin is warm and dry  Findings: No erythema  Neurological:      Mental Status: She is alert and oriented to person, place, and time  Cranial Nerves: No cranial nerve deficit  Psychiatric:         Behavior: Behavior normal          Thought Content: Thought content normal          Judgment: Judgment normal            Left wrist exam:  No swelling or deformity  There is mild tenderness to palpation over the 1st dorsal compartment at the level of radial styloid    Positive Finkelstein's test   Negative carpal tunnel compression test   Negative Tinel's over the carpal tunnel  No discomfort to palpation over the 1st carpometacarpal joint, 1st metacarpophalangeal joint or the left thumb IP joint  Able to make a full fist   Normal sensation to light touch in all digits of the left hand  Hand/upper extremity injection: L extensor compartment 1  Universal Protocol:  Procedure performed by: (Dr Andrew López performed injection under my direct supervision )  Consent: Verbal consent obtained  Risks and benefits: risks, benefits and alternatives were discussed  Consent given by: patient  Patient understanding: patient states understanding of the procedure being performed  Patient consent: the patient's understanding of the procedure matches consent given  Site marked: the operative site was marked  Radiology Images displayed and confirmed   If images not available, report reviewed: imaging studies available  Required items: required blood products, implants, devices, and special equipment available  Patient identity confirmed: verbally with patient    Supporting Documentation  Indications: diagnostic, pain and therapeutic   Procedure Details  Condition:de Quervain's tenosynovitis Site: L extensor compartment 1   Preparation: Patient was prepped and draped in the usual sterile fashion  Needle size: 25 G  Ultrasound guidance: yes (High-frequency linear ultrasound transducer used with sterile ultrasound gel and sterile transducer cover)  Approach: radial  Medications administered: 0 5 mL lidocaine 1 %; 3 mg betamethasone acetate-betamethasone sodium phosphate 6 (3-3) mg/mL    Patient tolerance: patient tolerated the procedure well with no immediate complications  Dressing:  Sterile dressing applied

## 2022-03-03 ENCOUNTER — TELEPHONE (OUTPATIENT)
Dept: OBGYN CLINIC | Facility: OTHER | Age: 42
End: 2022-03-03

## 2022-03-03 NOTE — TELEPHONE ENCOUNTER
Left message regarding 03/03/22 ultrasound guided injection appointment  Patient's appointment will need rescheduled  Provider will be at a meeting

## 2022-03-06 DIAGNOSIS — F41.8 DEPRESSION WITH ANXIETY: ICD-10-CM

## 2022-03-06 DIAGNOSIS — J30.2 SEASONAL ALLERGIES: ICD-10-CM

## 2022-03-07 RX ORDER — ESCITALOPRAM OXALATE 10 MG/1
10 TABLET ORAL DAILY
Qty: 30 TABLET | Refills: 0 | Status: SHIPPED | OUTPATIENT
Start: 2022-03-07 | End: 2022-04-02 | Stop reason: SDUPTHER

## 2022-03-07 RX ORDER — IPRATROPIUM BROMIDE 21 UG/1
2 SPRAY, METERED NASAL EVERY 12 HOURS
Qty: 30 ML | Refills: 0 | Status: SHIPPED | OUTPATIENT
Start: 2022-03-07 | End: 2022-04-21 | Stop reason: SDUPTHER

## 2022-03-22 ENCOUNTER — OFFICE VISIT (OUTPATIENT)
Dept: FAMILY MEDICINE CLINIC | Facility: CLINIC | Age: 42
End: 2022-03-22
Payer: COMMERCIAL

## 2022-03-22 VITALS
BODY MASS INDEX: 25.86 KG/M2 | TEMPERATURE: 99.8 F | SYSTOLIC BLOOD PRESSURE: 124 MMHG | DIASTOLIC BLOOD PRESSURE: 86 MMHG | HEIGHT: 65 IN | OXYGEN SATURATION: 99 % | WEIGHT: 155.2 LBS | HEART RATE: 88 BPM

## 2022-03-22 DIAGNOSIS — F41.8 DEPRESSION WITH ANXIETY: Primary | ICD-10-CM

## 2022-03-22 DIAGNOSIS — M79.645 CHRONIC THUMB PAIN, BILATERAL: ICD-10-CM

## 2022-03-22 DIAGNOSIS — M79.644 CHRONIC THUMB PAIN, BILATERAL: ICD-10-CM

## 2022-03-22 DIAGNOSIS — G89.29 CHRONIC THUMB PAIN, BILATERAL: ICD-10-CM

## 2022-03-22 DIAGNOSIS — Z13.0 SCREENING FOR DEFICIENCY ANEMIA: ICD-10-CM

## 2022-03-22 DIAGNOSIS — Z13.1 SCREENING FOR DIABETES MELLITUS: ICD-10-CM

## 2022-03-22 PROCEDURE — 99214 OFFICE O/P EST MOD 30 MIN: CPT | Performed by: NURSE PRACTITIONER

## 2022-03-23 NOTE — PROGRESS NOTES
[facility]    ASSESSMENT AND PLAN     1  Depression with anxiety  Doing ok  No negative effects  Feels less stress  Anxiety and depression are improved, but still present  No SI/HI  She is much less emotional  Discussed she may get better result with slight increase  Will try increase to 15 today  F/U 4 weeks to reassess  Sooner if needed  Refill not needed today  Therapy referral still pending    2  Chronic thumb pain, bilateral  Chronic, multiple joint pain  Has seen specialist  Undetermined cause of symptoms  Is due to return  Notes increased pain specifically in b/l thumb joints  Will get xrays so they are available for visit  - XR thumb left first digit-min 2v; Future  - XR thumb right first digit-min 2v; Future    3  Screening for deficiency anemia  Screening lab work  May complete when she gets the "caring starts with you" labs done    - CBC and differential; Future    4  Screening for diabetes mellitus    - Comprehensive metabolic panel; Future            SUBJECTIVE       Patient ID: Berta Soto is a 46 y o  female  Chief Complaint   Patient presents with    Medication Management     Started on lexapro       HISTORY OF PRESENT ILLNESS    Medication check up since starting LExapro        The following portions of the patient's history were reviewed and updated as appropriate: allergies, current medications, past family history, past medical history, past social history, past surgical history and problem list     REVIEW OF SYSTEMS  Review of Systems   Constitutional: Negative  HENT: Negative  Respiratory: Negative  Cardiovascular: Negative  Gastrointestinal: Negative  Genitourinary: Negative  Musculoskeletal: Positive for arthralgias  Neurological: Negative  Psychiatric/Behavioral: Negative for agitation, decreased concentration, dysphoric mood, self-injury, sleep disturbance and suicidal ideas  The patient is nervous/anxious          OBJECTIVE      VITAL SIGNS  BP 124/86 (BP Location: Right arm, Patient Position: Sitting, Cuff Size: Adult)   Pulse 88   Temp 99 8 °F (37 7 °C)   Ht 5' 5" (1 651 m)   Wt 70 4 kg (155 lb 3 2 oz)   LMP 02/22/2022 (Exact Date)   SpO2 99%   BMI 25 83 kg/m²     CURRENT MEDICATIONS    Current Outpatient Medications:     diphenhydrAMINE (BENADRYL) 25 mg capsule, Take 25 mg by mouth every 6 (six) hours as needed for itching, Disp: , Rfl:     escitalopram (Lexapro) 10 mg tablet, Take 1 tablet (10 mg total) by mouth daily, Disp: 30 tablet, Rfl: 0    ibuprofen (MOTRIN) 200 mg tablet, Take by mouth every 6 (six) hours as needed for mild pain, Disp: , Rfl:     ipratropium (ATROVENT) 0 03 % nasal spray, 2 sprays into each nostril every 12 (twelve) hours, Disp: 30 mL, Rfl: 0      PHYSICAL EXAMINATION   Physical Exam  Vitals and nursing note reviewed  Constitutional:       General: She is not in acute distress  Appearance: Normal appearance  She is well-developed and well-groomed  She is not ill-appearing  Eyes:      Pupils: Pupils are equal, round, and reactive to light  Neck:      Vascular: No carotid bruit  Cardiovascular:      Rate and Rhythm: Normal rate and regular rhythm  Heart sounds: Normal heart sounds  Pulmonary:      Effort: Pulmonary effort is normal  No respiratory distress  Breath sounds: Normal breath sounds and air entry  Musculoskeletal:      Right hand: Tenderness (CMC / MCP joint) present  No swelling  Normal range of motion  Normal strength  Normal sensation  Normal capillary refill  Normal pulse  Left hand: Tenderness (CMC / MCP joint) present  No swelling  Normal range of motion  Normal strength  Normal sensation  Normal capillary refill  Normal pulse  Right lower leg: No edema  Left lower leg: No edema  Skin:     General: Skin is warm and dry  Neurological:      General: No focal deficit present  Mental Status: She is alert and oriented to person, place, and time     Psychiatric: Attention and Perception: Attention normal          Mood and Affect: Mood normal          Speech: Speech normal          Behavior: Behavior normal          Thought Content:  Thought content normal          Cognition and Memory: Cognition normal          Judgment: Judgment normal

## 2022-03-30 ENCOUNTER — APPOINTMENT (OUTPATIENT)
Dept: RADIOLOGY | Facility: OTHER | Age: 42
End: 2022-03-30
Payer: COMMERCIAL

## 2022-03-30 ENCOUNTER — OFFICE VISIT (OUTPATIENT)
Dept: OBGYN CLINIC | Facility: OTHER | Age: 42
End: 2022-03-30
Payer: COMMERCIAL

## 2022-03-30 VITALS
SYSTOLIC BLOOD PRESSURE: 118 MMHG | WEIGHT: 154.6 LBS | DIASTOLIC BLOOD PRESSURE: 84 MMHG | HEIGHT: 65 IN | HEART RATE: 73 BPM | BODY MASS INDEX: 25.76 KG/M2

## 2022-03-30 DIAGNOSIS — M79.644 BILATERAL THUMB PAIN: ICD-10-CM

## 2022-03-30 DIAGNOSIS — M25.531 BILATERAL WRIST PAIN: Primary | ICD-10-CM

## 2022-03-30 DIAGNOSIS — M25.532 BILATERAL WRIST PAIN: Primary | ICD-10-CM

## 2022-03-30 DIAGNOSIS — M79.645 BILATERAL THUMB PAIN: ICD-10-CM

## 2022-03-30 DIAGNOSIS — M25.532 BILATERAL WRIST PAIN: ICD-10-CM

## 2022-03-30 DIAGNOSIS — M25.531 BILATERAL WRIST PAIN: ICD-10-CM

## 2022-03-30 DIAGNOSIS — M25.532 PAIN IN LEFT WRIST: ICD-10-CM

## 2022-03-30 PROCEDURE — 73130 X-RAY EXAM OF HAND: CPT

## 2022-03-30 PROCEDURE — 99213 OFFICE O/P EST LOW 20 MIN: CPT | Performed by: ORTHOPAEDIC SURGERY

## 2022-03-30 NOTE — PROGRESS NOTES
Assessment:       1  Bilateral wrist pain    2  Bilateral thumb pain          Plan:        I had a detailed discussion with Jefry with regards to her persistent pain affecting the wrist and hand  Currently, her main concern is bilateral thumb MCP and IP joint discomfort  In this regard I will request an limited ultrasound evaluation of these joints to assess for any inflammatory/rheumatological pathology  I also recommend her follow-up with Rheumatology  We will see her back after her bilateral hand ultrasound  Subjective:     Patient ID: Dave Walton is a 46 y o  female  Chief Complaint:  Bilateral thumb pain    HPI  Jefry Is a 61-year-old lady who presents here for follow-up of bilateral wrist and hand pain  She has had symptoms persisting for over an year duration  In this regard she was initially evaluated by orthopedic MAURA Rico on 11/1/2021  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 and was recommended to wear a thumb spica brace bilaterally  She was evaluated by me on 12/27/2021  Assessment had revealed some discomfort with carpal tunnel compression bilaterally without any neurological deficits on evaluation  An EMG study of bilateral upper extremity was subsequently performed and was noted to be within normal limits  She also were nighttime wrist brace and took a Medrol Dosepak without much improvement  Upon follow-up, she then noted bilateral wrist pain on the radial aspect with some radiation to bilateral thumbs  Mild fluid collection was noted in the 1st dorsal compartment tendon sheath on office based ultrasound and a diagnostic ultrasound-guided left wrist 1st dorsal compartment cortisone injection was performed  Today, the patient reports that she has not had any improvement of her symptoms    On today's visit, she now points the pain to be primarily affecting the metacarpophalangeal as well as the IP joint of bilateral thumb and describes this as an "aching and stretching" sensation with thumb flexion  Currently, denies any tingling or numbness of the upper extremities  Notably, patient does have a family history of rheumatoid arthritis in her mother and her lab test in the past has revealed a positive antinuclear antibody  She has been evaluated with regards to her hand symptoms at [facility] as well and was evaluated by rheumatology on 3/31/2021  She was suggested to wear wrist splint for de Quervain tendinitis and use topical diclofenac gel  Follow-up with Rheumatology was suggested at that visit but I do not have any documentation suggesting a follow-up visit with Rheumatology  Social History     Occupational History    Not on file   Tobacco Use    Smoking status: Never Smoker    Smokeless tobacco: Never Used   Vaping Use    Vaping Use: Never used   Substance and Sexual Activity    Alcohol use: Yes     Alcohol/week: 4 0 standard drinks     Types: 4 Glasses of wine per week     Comment: 1 glass of wine per night    Drug use: Not Currently    Sexual activity: Yes     Partners: Male     Birth control/protection: Female Sterilization      Review of Systems        Objective:     Ortho ExamPhysical Exam  Vitals and nursing note reviewed  Constitutional:       Appearance: She is well-developed  HENT:      Head: Normocephalic and atraumatic  Eyes:      Conjunctiva/sclera: Conjunctivae normal       Pupils: Pupils are equal, round, and reactive to light  Cardiovascular:      Rate and Rhythm: Normal rate and regular rhythm  Pulmonary:      Effort: Pulmonary effort is normal  No respiratory distress  Skin:     General: Skin is warm and dry  Findings: No erythema  Neurological:      Mental Status: She is alert and oriented to person, place, and time  Cranial Nerves: No cranial nerve deficit  Psychiatric:         Behavior: Behavior normal          Thought Content:  Thought content normal  Judgment: Judgment normal           Right wrist and hand exam:  There is no swelling or deformity of the wrist or any digit of the right hand  There is no tenderness to palpation over the carpal tunnel or any of the wrist extensor or flexor compartments  There is good range of right wrist flexion extension without discomfort  There is no tenderness to palpation over the carpometacarpal joint, radial styloid  Negative Finkelstein's  This some mild discomfort to palpation over the thumb metacarpophalangeal joint as well as the IP joint  No laxity noted on valgus or varus stress testing of the thumb MCP joint or the IP joint  Grade 5/5 strength of the right thumb in all directions  Reports some discomfort with active flexion of the thumb MCP and IP joints  No focal neurological deficits noted of the right hand  Good right hand  strength    Left hand and wrist exam:  There is no swelling or deformity  There is no tenderness to palpation over the 1st dorsal compartment or other wrist flexor or extensor compartments  There is no tenderness to palpation over the carpal tunnel  There is good range of left wrist flexion and extension without discomfort  There is no tenderness to palpation over the carpometacarpal joint or the radial styloid  Negative Finkelstein's  Good left hand  strength  Grade 5/5 strength of the left thumb in all directions  Reports some discomfort with active flexion of the thumb MCP and IP joints  No laxity noted on valgus or varus stress testing of the thumb MCP and IP joints  No focal neurological deficits noted of the left hand  I have personally reviewed pertinent films in PACS and my interpretation is As noted below:  Plain radiograph of the left wrist and hand does not reveal any acute osseous abnormality, erosive or degenerative changes  Small focus of heterotopic ossification noted near the distal pole of scaphoid      Plain radiograph of the right wrist and hand does not reveal any acute osseous abnormality, erosive or degenerative changes

## 2022-04-02 DIAGNOSIS — F41.8 DEPRESSION WITH ANXIETY: ICD-10-CM

## 2022-04-02 RX ORDER — ESCITALOPRAM OXALATE 10 MG/1
10 TABLET ORAL DAILY
Qty: 30 TABLET | Refills: 0 | Status: SHIPPED | OUTPATIENT
Start: 2022-04-02 | End: 2022-04-21 | Stop reason: SDUPTHER

## 2022-04-05 ENCOUNTER — HOSPITAL ENCOUNTER (OUTPATIENT)
Dept: RADIOLOGY | Facility: HOSPITAL | Age: 42
Discharge: HOME | End: 2022-04-05
Attending: ORTHOPAEDIC SURGERY
Payer: COMMERCIAL

## 2022-04-05 DIAGNOSIS — M79.645 BILATERAL THUMB PAIN: ICD-10-CM

## 2022-04-05 DIAGNOSIS — M79.644 BILATERAL THUMB PAIN: ICD-10-CM

## 2022-04-05 PROCEDURE — 76882 US LMTD JT/FCL EVL NVASC XTR: CPT

## 2022-04-25 ENCOUNTER — TELEPHONE (OUTPATIENT)
Dept: FAMILY MEDICINE CLINIC | Facility: CLINIC | Age: 42
End: 2022-04-25

## 2022-04-25 NOTE — TELEPHONE ENCOUNTER
PT was calling regarding her RX for escitalopram 15 mg  PT is currently taking 1 pill and cutting another in half  PT wanted to know if she could be sent a RX with the correct dose of 15 mg and refills

## 2022-04-28 ENCOUNTER — TELEPHONE (OUTPATIENT)
Dept: OBGYN CLINIC | Facility: OTHER | Age: 42
End: 2022-04-28

## 2022-04-28 DIAGNOSIS — M79.644 BILATERAL THUMB PAIN: ICD-10-CM

## 2022-04-28 DIAGNOSIS — M25.531 BILATERAL WRIST PAIN: Primary | ICD-10-CM

## 2022-04-28 DIAGNOSIS — M25.532 BILATERAL WRIST PAIN: Primary | ICD-10-CM

## 2022-04-28 DIAGNOSIS — M79.645 BILATERAL THUMB PAIN: ICD-10-CM

## 2022-04-28 NOTE — TELEPHONE ENCOUNTER
Left message for patient  Informed her that Dr Riya Reyes has made a referral to Hand Orthopedic Surgery for her bilateral thumb pain  Advised to call office back in regards to scheduling

## 2022-05-06 ENCOUNTER — TELEPHONE (OUTPATIENT)
Dept: FAMILY MEDICINE CLINIC | Facility: CLINIC | Age: 42
End: 2022-05-06

## 2022-05-09 ENCOUNTER — TELEPHONE (OUTPATIENT)
Dept: PSYCHIATRY | Facility: CLINIC | Age: 42
End: 2022-05-09

## 2022-05-09 NOTE — TELEPHONE ENCOUNTER
Behavorial Health Outpatient Intake Questions    Referred by: PCP    Please advised interviewee that they need to answer all questions truthfully to allow for best care and any misrepresentations of information may affect their ability to be seen at this clinic   => Was this discussed? Yes     [facility] -     Presenting Problem (in patient's words): Has been going through some life changes  Just recently moved and is helping take care of sick sister  Feels like covid has brought on a lot of anxiety  and depression  Are there any developmental disabilities? ? If yes, can they speak to you on the phone? If they are too limited to speak to you on phone, refer out No    Are you taking any psychiatric medications? Yes    => If yes, who prescribes? If yes, are they injectable medications? Lexapro=pcp    Does the patient have a language barrier or hearing impairment? No    Have you been treated at [facility] by a therapist or a doctor in the past? If yes, who? No    Has the patient been hospitalized for mental health? No   If yes, how long ago was last hospitalization and where was it? Do you actively use alcohol or marijuana or illegal substances? If yes, what and how much - refer out to Drug and alcohol treatment if use is excessive or daily use of illegal substances No concerns of substance abuse are reported  Do you have a community treatment team or ? No    Legal History-     Does the patient have any history of arrests, alf/USP time, or DUIs? No  If Yes-  1) What types of charges? 2) When were they last incarcerated? 3) Are they currently on parole or probation? Minor Child-    Who has custody of the child? Is there a custody agreement? If there is a custody agreement remind parent that they must bring a copy to the first appt or they will not be seen       Intake Team, please check with provider before scheduling if flags come up such as:  - complex case  - legal history (other than DUI)  - communication barrier concerns are present  - if, in your judgment, this needs further review    ACCEPTED as a patient Yes  => Appointment Date: 5/16/2022 with Kathleen Villalba at 6 PM    Referred Elsewhere? No    Name of Insurance Co: [place] [employer]#[id number]  Insurance Phone #  If ins is primary or secondary  If patient is a minor, parents information such as Name, D  O B of guarantor

## 2022-05-11 DIAGNOSIS — F41.8 DEPRESSION WITH ANXIETY: ICD-10-CM

## 2022-05-11 RX ORDER — ESCITALOPRAM OXALATE 5 MG/1
5 TABLET ORAL DAILY
Qty: 7 TABLET | Refills: 0 | Status: SHIPPED | OUTPATIENT
Start: 2022-05-11 | End: 2022-05-16

## 2022-05-11 RX ORDER — ESCITALOPRAM OXALATE 10 MG/1
10 TABLET ORAL DAILY
Qty: 7 TABLET | Refills: 0 | Status: SHIPPED | OUTPATIENT
Start: 2022-05-11 | End: 2022-05-17 | Stop reason: ALTCHOICE

## 2022-05-16 ENCOUNTER — TELEMEDICINE (OUTPATIENT)
Dept: PSYCHIATRY | Facility: CLINIC | Age: 42
End: 2022-05-16

## 2022-05-16 DIAGNOSIS — F32.A DEPRESSION, UNSPECIFIED DEPRESSION TYPE: Primary | ICD-10-CM

## 2022-05-16 NOTE — PSYCH
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[employer]  She has two small children  Cordell Manrique states she's always struggling with some level of anxiety but more recently it's become more comfortable  Her sister was diagnosed with health condition (Multiple Myeloma); Cordell Manrique and her family moved to be closer to sister so they had to find a house and get moved; she had to start a new job  She's also helping her sister with her two small children  Cordell Manrique is very close with her sister and considers her to be her best friend so she is feeling depressed about her sister's health condition  Cordell Manrique is also struggling with feeling irritable--she's noticed this started after having her first child, which was about 5 years ago  Patient ID: Deidra Castillo is a 46 y o  female  HPI:     Pre-morbid level of function and History of Present Illness: Cordell Manrique states she's been experiencing increased anxiety in the past year    Previous Psychiatric/psychological treatment/year: N/A  Current Psychiatrist/Therapist: N/A  Outpatient and/or Partial and Other Community Resources Used (CTT, ICM, VNA): no previous services      Problem Assessment:     SOCIAL/VOCATION:  Family Constellation (include parents, relationship with each and pertinent Psych/Medical History):     Family History   Problem Relation Age of Onset   Marshall Moose Breast cancer Mother 36    Rheum arthritis Mother     Throat cancer Mother     Cancer Mother         Breast and oropharyngeal CA    Hypertension Father     Heart disease Father     Sleep apnea Father     Depression Father         Undiagnosed specific    Anxiety disorder Father     Multiple myeloma Sister     Cancer Sister         Multiple myeloma diagnosed feb this year       Mother: has beaten cancer twice; very close relationship with mom  Spouse: "Jorgito Pérez"; he's a huge support   Father: thinks he has undiagnosed anxiety & depression; he has a lot of joint issues, chronic lyme disease; mental health seems affected by health issues; very close relationship with dad  Children: 2 children; 11year old daughter (Crystal Kumar), 3year old son Maged Mathis)   Sibling: One older sister; very close, considers her to be her best friend; currently battling Multiple Myeloma    Sibling: N/A   Children: N/A   Other: N/A    Agustin Hathaway relates best to her sister  she lives with  and two children  she does not live alone  Domestic Violence: No past history of domestic violence    Additional Comments related to family/relationships/peer support: has several friends but family is primarily her support; former coworker who is also a nurse is a big support but it's long distance  School or Work History (strengths/limitations/needs): [employer], graduated 2001; [employer], graduated 2005; masters in nursing leadership from [employer], graduated December 2021  Works at [employer]  Her highest grade level achieved was masters degree   history includes N/A     Financial status includes full-time employment  LEISURE ASSESSMENT (Include past and present hobbies/interests and level of involvement (Ex: Group/Club Affiliations): like to paint; looking to take art classes  her primary language is [place]  Preferred language is [place]  Ethnic considerations are N/A  Religions affiliations and level of involvement N/A   Does spirituality help you cope?  No    FUNCTIONAL STATUS: There has been a recent change in Agustin Hathaway ability to do the following: no changes    Level of Assistance Needed/By Whom?: N/A    Agustin Hathaway learns best by  listening and demonstration    SUBSTANCE ABUSE ASSESSMENT: no substance abuse    Substance/Route/Age/Amount/Frequency/Last Use: N/A    DETOX HISTORY: N/A    Previous detox/rehab treatment: N/A     HEALTH ASSESSMENT: no referral to PCP needed    LEGAL: No Mental Health Advance Directive or Power of  on file    Prenatal History: N/A    Delivery History: She was a month late; delivered via     Developmental Milestones: all milestones met within normal limits  Temperament as an infant was normal     Temperament as a toddler was normal   Temperament at school age was normal   Temperament as a teenager was normal     Risk Assessment:   The following ratings are based on my interview(s) with Anupam Decker    Risk of Harm to Self:   Demographic risk factors include   Historical Risk Factors include N/A  Recent Specific Risk Factors include N/A  Additional Factors for a Child or Adolescent N/A    Risk of Harm to Others:   Demographic Risk Factors include N/A  Historical Risk Factors include N/A  Recent Specific Risk Factors include N/A    Access to Weapons:   Anupam Decker has access to the following weapons: None  The following steps have been taken to ensure weapons are properly secured: N/A    Based on the above information, the client presents the following risk of harm to self or others:  low    The following interventions are recommended:   no intervention changes    Notes regarding this Risk Assessment: No history of aggression, self-injuring behaviors or suicidal ideation          Review Of Systems:     Mood Normal   Behavior Normal    Thought Content Normal   General Normal    Personality Normal   Other Psych Symptoms Normal   Constitutional Normal   ENT Normal   Cardiovascular Normal    Respiratory Normal    Gastrointestinal Normal   Genitourinary Normal    Musculoskeletal Negative   Integumentary Normal    Neurological Normal    Endocrine Normal          Mental status:  Appearance calm and cooperative , adequate hygiene and grooming and good eye contact    Mood mood appropriate   Affect affect appropriate    Speech a normal rate Thought Processes normal thought processes   Hallucinations no hallucinations present    Thought Content no delusions   Abnormal Thoughts no suicidal thoughts  and no homicidal thoughts    Orientation  oriented to person, oriented to place and oriented to time   Remote Memory short term memory intact and long term memory intact   Attention Span concentration intact   Intellect Appears to be Above Average Intelligence and Not [address] of Knowledge displays adequate knowledge of current events, adequate fund of knowledge regarding past history and adequate fund of knowledge regarding vocabulary    Insight Insight intact   Judgement judgment was intact   Muscle Strength N/A   Language no difficulty naming common objects, no difficulty repeating a phrase  and no difficulty writing a sentence    Pain none   Pain Scale 0       Virtual Regular Visit    Verification of patient location:    Patient is located in the following state in which I hold an active license [place]      Assessment/Plan:    Problem List Items Addressed This Visit    None     Visit Diagnoses     Depression, unspecified depression type    -  Primary          Goals addressed in session: Goal 1          Reason for visit is   Chief Complaint   Patient presents with    Virtual Regular Visit        Encounter provider Gonzalez Gsatelum LCSW    Provider located at [address] [place] [id number] [phone]      Recent Visits  No visits were found meeting these conditions  Showing recent visits within past 7 days and meeting all other requirements  Future Appointments  No visits were found meeting these conditions  Showing future appointments within next 150 days and meeting all other requirements       The patient was identified by name and date of birth   Karuna Hirsch was informed that this is a telemedicine visit and that the visit is being conducted through[employer] and patient was informed that this is a secure, HIPAA-compliant platform  She agrees to proceed     My office door was closed  No one else was in the room  She acknowledged consent and understanding of privacy and security of the video platform  The patient has agreed to participate and understands they can discontinue the visit at any time  Patient is aware this is a billable service  Subjective  Bryce Jurado is a 46 y o  female   HPI     Past Medical History:   Diagnosis Date    Allergic     Seasonal, skin in right nostril unhealing fissure, improved    Arthritis 2021    Unable to open jars or use thimbs without sharp pain    Depression     IBS (irritable bowel syndrome)     Ovarian cyst        Past Surgical History:   Procedure Laterality Date     SECTION      x 2    CLAVICLE SURGERY Left     SALPINGECTOMY Bilateral 2018       Current Outpatient Medications   Medication Sig Dispense Refill    diphenhydrAMINE (BENADRYL) 25 mg capsule Take 25 mg by mouth every 6 (six) hours as needed for itching      escitalopram (Lexapro) 10 mg tablet Take 1 tablet (10 mg total) by mouth in the morning  7 tablet 0    ibuprofen (MOTRIN) 200 mg tablet Take by mouth every 6 (six) hours as needed for mild pain      ipratropium (ATROVENT) 0 03 % nasal spray 2 sprays into each nostril every 12 (twelve) hours 30 mL 0     No current facility-administered medications for this visit  Allergies   Allergen Reactions    Bee Venom Swelling    Sulfamethoxazole-Trimethoprim Rash     rash       Review of Systems    Video Exam    There were no vitals filed for this visit  Physical Exam     I spent 45 minutes directly with the patient during this visit    VIRTUAL VISIT DISCLAIMER    Bryce Jurado verbally agrees to participate in [employer]   Pt is aware that [employer] could be limited without vital signs or the ability to perform a full hands-on physical Yajaira Forman understands she or the provider may request at any time to terminate the video visit and request the patient to seek care or treatment in person

## 2022-05-17 ENCOUNTER — TELEMEDICINE (OUTPATIENT)
Dept: FAMILY MEDICINE CLINIC | Facility: CLINIC | Age: 42
End: 2022-05-17
Payer: COMMERCIAL

## 2022-05-17 DIAGNOSIS — F41.9 ANXIETY: Primary | ICD-10-CM

## 2022-05-17 PROCEDURE — 99213 OFFICE O/P EST LOW 20 MIN: CPT | Performed by: NURSE PRACTITIONER

## 2022-05-17 RX ORDER — BUSPIRONE HYDROCHLORIDE 5 MG/1
5 TABLET ORAL 2 TIMES DAILY
Qty: 60 TABLET | Refills: 1 | Status: SHIPPED | OUTPATIENT
Start: 2022-05-17

## 2022-05-18 NOTE — ASSESSMENT & PLAN NOTE
Presents to discuss change in symptoms  Was feeling tired and more "irritated" with the Lexapro increase to 15  She self weaned back to 10 a few days ago  Feels the Lexapro is no longer helping her anxiety and would like to look at other options  No longer feels he symptoms are depression  Met with therapist yesterday (for the first time) and she felt her symptoms fell more into anxiety than depression  She feels her anxiety is constantly present to some extent, but notices it more when she is alone or her surroundings are quiet  As she feels she has failed with Sertraline and now Celena Hogan would like to try the [employer] swab if insurance covers  She will call to inquire  In the mean time, she would like to stop Lexapro and try an alternate  We discussed trialling Buspar  Will start at 5 mg BID  Follow back up with therapist in 2 weeks  F U here in 4 weeks  Sooner with any concerns  She will call office and schedule nurse visit for Genesight if covered

## 2022-06-06 ENCOUNTER — TELEMEDICINE (OUTPATIENT)
Dept: PSYCHIATRY | Facility: CLINIC | Age: 42
End: 2022-06-06

## 2022-06-06 DIAGNOSIS — F41.9 ANXIETY: Primary | ICD-10-CM

## 2022-06-06 NOTE — PSYCH
INDIVIDUAL SESSION NOTE     Length of time in session: 52 minutes, follow up in 2 weeks     Psychotherapy Provided: Individual Psychotherapy 52 minutes      Diagnosis ICD-10-CM Associated Orders   1  Anxiety  F41 9           Goals addressed in session: Goal 1     Pain:      none    0    Current suicide risk:  minimal    Data: Met with Vanesa Rivera for scheduled individual session  Topics discussed included relationship with significant other and work-related stress  Things have been good, "same stressors as always"  She said she weaned herself off of Lexapro because it makes her feel "muted and tired"  Cathy Rushing stated she started Buspar for anxiety  Her job position has changed so she's not as interactive than she used to be so she feels a bit of pressure with deadlines and such  Cathy Rushing states there is stress between she and her , which she feels is due to lack of intimacy and not having alone time  They work opposite shifts and have young children  She also feels like they have different parenting styles  Cathy Rushing said she's always been a calm, easy going person but with her anxiety, she's not feeling that way which is upsetting to her because she wants her kids to see that side of her  We discussed realistic expectations and setting goals that are reasonable versus "over achieving"  We also discussed work-life balance and incorporating self-care into the work day (small breaks, getting fresh air, etc)  Client shows evidence of utilizing weighing pros and cons and effective communication skills skills to manage mental health symptoms  During this session, this clinical used the following therapeutic modalities engagement strategies, supportive psychotherapy and client-centered therapy  Clinician provided psychoeducation regarding use of coping skills       Assessment:  Cathy Rushing presents with a normal mood  her affect is normal range and intensity, appropriate  Cathygregor Rushing was oriented x3  She was focused and engaged  Charleen Avendaño exhibits early engagement with this clinician  she continues to exhibit willingness to work on treatment goals and objectives  Charleen Avendaño presents with a minimal risk of suicide, minimal risk of self-harm and minimal of harm to others  Plan:  Charleen Avendaño will return in 2 weeks for the next scheduled session  Between sessions, she  will self-reflect and will report back during the next session re: success and barriers  At the next session, this clinical will use engagement strategies and supportive psychotherapy to address her anxiety, in effort to assist Charleen Avendaño with meeting treatment goals  Behavioral Health Treatment Plan [facility]: Diagnosis and Treatment Plan explained to Wilfredo Valenzuela relates understanding diagnosis and is agreeable to Treatment Plan  Yes     Virtual Regular Visit    Verification of patient location:    Patient is located in the following state in which I hold an active license [place]      Assessment/Plan:    Problem List Items Addressed This Visit        Other    Anxiety - Primary          Goals addressed in session: Goal 1          Reason for visit is   Chief Complaint   Patient presents with    Virtual Regular Visit        Encounter provider Karlene Espinoza LCSW    Provider located at [address] [place] [id number] [phone]      Recent Visits  No visits were found meeting these conditions  Showing recent visits within past 7 days and meeting all other requirements  Future Appointments  No visits were found meeting these conditions  Showing future appointments within next 150 days and meeting all other requirements       The patient was identified by name and date of birth  Flaquita Benitez was informed that this is a telemedicine visit and that the visit is being conducted through[employer] and patient was informed that this is a secure, HIPAA-compliant platform  She agrees to proceed  [facility] My office door was closed  No one else was in the room  She acknowledged consent and understanding of privacy and security of the video platform  The patient has agreed to participate and understands they can discontinue the visit at any time  Patient is aware this is a billable service  Subjective  Donny Melendez is a 46 y o  female  HPI     Past Medical History:   Diagnosis Date    Allergic     Seasonal, skin in right nostril unhealing fissure, improved    Arthritis 2021    Unable to open jars or use thimbs without sharp pain    Depression     IBS (irritable bowel syndrome)     Ovarian cyst        Past Surgical History:   Procedure Laterality Date     SECTION      x 2    CLAVICLE SURGERY Left     SALPINGECTOMY Bilateral 2018       Current Outpatient Medications   Medication Sig Dispense Refill    busPIRone (BUSPAR) 5 mg tablet Take 1 tablet (5 mg total) by mouth in the morning and 1 tablet (5 mg total) in the evening  60 tablet 1    diphenhydrAMINE (BENADRYL) 25 mg capsule Take 25 mg by mouth every 6 (six) hours as needed for itching      ibuprofen (MOTRIN) 200 mg tablet Take by mouth every 6 (six) hours as needed for mild pain      ipratropium (ATROVENT) 0 03 % nasal spray 2 sprays into each nostril every 12 (twelve) hours 30 mL 0     No current facility-administered medications for this visit  Allergies   Allergen Reactions    Bee Venom Swelling    Sulfamethoxazole-Trimethoprim Rash     rash       Review of Systems    Video Exam    There were no vitals filed for this visit  Physical Exam     I spent 52 minutes directly with the patient during this visit    VIRTUAL VISIT DISCLAIMER    Donny Melendez verbally agrees to participate in [employer]   Pt is aware that [employer] could be limited without vital signs or the ability to perform a full hands-on physical exam  Megan Franks understands she or the provider may request at any time to terminate the video visit and request the patient to seek care or treatment in person

## 2022-06-06 NOTE — BH TREATMENT PLAN
Gretta Izaguirre  1980       Date of Initial Treatment Plan: 6/6/22   Date of Current Treatment Plan: 06/06/22        Treatment Plan Number: 1     Strengths/Personal Resources for Self Care: Kimberly Varela is good sense of humor and independent as well as has recognizes needs for mental health treatment and managing surrounding demands and opportunities  Diagnosis:   1  Anxiety         Area of Needs: challenging maladaptive behaviors and thinking patterns and emotional support distress tolerance skills      Long Term Goal 1:  Kimberly Varela will learn and implement coping skills that results in a reduction of anxiety and worry, and improved daily functioning  Target Date: 6/6/23  Completion Date: to be determined         Short Term Objectives for Goal 1:      A  Kimberly Varela will identify 4-5 symptoms of anxiety that impacts her daily functioning  Bhat Miami will learn and implement calming skills to reduce overall anxiety and manage anxiety, such has mindfulness,    progressive muscle relaxation, mindful breathing, cognitive reframing, awareness of physical sensations from emotions    (emotional intelligence)  Maddi Love will assist Kimberly Varela in analyzing worries by examining potential bias and thought processes (the negative         expectations occurring, the real consequences of it occurring, ability to control the outcome, the worst possible outcome         and the ability to accept it)  Goal 1: Modality: Individual 2x per month   Completion Date to be determined and The person(s) responsible for carrying out the plan is  clinician and Kimberly Nichole  Behavioral Health Treatment Plan [facility]: Diagnosis and Treatment Plan explained to Edgar Dunlap relates understanding diagnosis and is agreeable to Treatment Plan  Client Comments : Please share your thoughts, feelings, need and/or experiences regarding your treatment plan: agreeable

## 2022-07-09 DIAGNOSIS — J30.2 SEASONAL ALLERGIES: ICD-10-CM

## 2022-07-11 RX ORDER — IPRATROPIUM BROMIDE 21 UG/1
2 SPRAY, METERED NASAL EVERY 12 HOURS
Qty: 30 ML | Refills: 0 | Status: SHIPPED | OUTPATIENT
Start: 2022-07-11

## 2022-07-13 ENCOUNTER — TELEPHONE (OUTPATIENT)
Dept: OBGYN CLINIC | Facility: HOSPITAL | Age: 42
End: 2022-07-13

## 2022-07-13 NOTE — TELEPHONE ENCOUNTER
Patient is frustrated that she's scheduled with SHEBA and not Dr Reema Russo, said she was never told she would be seeing the PA  Adv Dr Reema Russo has a very busy schedule and [facility] assists with patients, she didn't care, wants an explanation  This appt doesn't look 'changed' to me, looks like she was always scheduled with BOD[facility]  Patient wants to reschedule to be seen by the doctor  I left the original AS IS just in case she changes her mind       # [phone]

## 2022-08-24 ENCOUNTER — HOSPITAL ENCOUNTER (OUTPATIENT)
Dept: MAMMOGRAPHY | Facility: MEDICAL CENTER | Age: 42
Discharge: HOME | End: 2022-08-24
Payer: COMMERCIAL

## 2022-08-24 VITALS — BODY MASS INDEX: 25.66 KG/M2 | WEIGHT: 154 LBS | HEIGHT: 65 IN

## 2022-08-24 DIAGNOSIS — Z12.31 ENCOUNTER FOR SCREENING MAMMOGRAM FOR MALIGNANT NEOPLASM OF BREAST: ICD-10-CM

## 2022-08-24 PROCEDURE — 77063 BREAST TOMOSYNTHESIS BI: CPT

## 2022-08-24 PROCEDURE — 77067 SCR MAMMO BI INCL CAD: CPT

## 2022-10-31 ENCOUNTER — ANNUAL EXAM (OUTPATIENT)
Dept: OBGYN CLINIC | Facility: CLINIC | Age: 42
End: 2022-10-31

## 2022-10-31 VITALS
SYSTOLIC BLOOD PRESSURE: 105 MMHG | WEIGHT: 159 LBS | HEIGHT: 65 IN | BODY MASS INDEX: 26.49 KG/M2 | DIASTOLIC BLOOD PRESSURE: 70 MMHG

## 2022-10-31 DIAGNOSIS — Z01.419 ENCOUNTER FOR WELL WOMAN EXAM WITH ROUTINE GYNECOLOGICAL EXAM: Primary | ICD-10-CM

## 2022-10-31 DIAGNOSIS — Z12.31 ENCOUNTER FOR SCREENING MAMMOGRAM FOR MALIGNANT NEOPLASM OF BREAST: ICD-10-CM

## 2022-10-31 NOTE — PROGRESS NOTES
[facility] [address], [address], [place], [place]    ASSESSMENT/PLAN: Trish Albright is a 38 y o  [id number] who presents for annual gynecologic exam     Encounter for routine gynecologic examination  - Routine well woman exam completed today  - Cervical Cancer Screening: Current ASCCP Guidelines reviewed  Last Pap: 2/2021  Pap every 2-3 years, discuss at next visit  - Contraceptive counseling discussed  Current contraception: BS done with prior CS     Additional problems addressed during this visit:  1  Encounter for well woman exam with routine gynecological exam    2  Encounter for screening mammogram for malignant neoplasm of breast  -     Mammo screening bilateral w 3d & cad; Future    CC:  Annual Gynecologic Examination    HPI: Trish Albright is a 38 y o  [id number] who presents for annual gynecologic examination  HPI  She reports no new changes to her health  She reports no breast concerns  Mother had history of breast cancer, diagnosed at age 36, has been getting annual mammos since age 30  She gets regular periods  She has no vaginal discharge, vulvar or vaginal lesions, pelvic pain, or abnormal bleeding  She has no sexual health concerns and is currently sexually active with one male partner  The following portions of the patient's history were reviewed and updated as appropriate: allergies, current medications, past family history, past medical history, obstetric history, gynecologic history, past social history, past surgical history and problem list     Review of Systems   Constitutional: Negative  HENT: Negative  Eyes: Negative  Respiratory: Negative  Cardiovascular: Negative  Gastrointestinal: Negative  Genitourinary: Negative  Musculoskeletal: Negative  All other systems reviewed and are negative          Objective:  /70 (BP Location: Right arm, Patient Position: Sitting, Cuff Size: Adult)   Ht 5' 5" (1 651 m)   Wt 72 1 kg (159 lb)   LMP 10/20/2022   BMI 26 46 kg/m²    Physical Exam  Vitals reviewed  Constitutional:       General: She is not in acute distress  Appearance: She is well-developed  HENT:      Head: Normocephalic and atraumatic  Nose: Nose normal    Cardiovascular:      Rate and Rhythm: Normal rate  Pulmonary:      Effort: Pulmonary effort is normal  No respiratory distress  Chest:   Breasts: Breasts are symmetrical       Right: Normal  No mass, nipple discharge, skin change, tenderness, axillary adenopathy or supraclavicular adenopathy  Left: Normal  No mass, nipple discharge, skin change, tenderness, axillary adenopathy or supraclavicular adenopathy  Abdominal:      General: There is no distension  Palpations: Abdomen is soft  There is no mass  Tenderness: There is no abdominal tenderness  There is no guarding or rebound  Genitourinary:     General: Normal vulva  Exam position: Lithotomy position  Labia:         Right: No lesion  Left: No lesion  Urethra: No prolapse (urethral meatus normal)  Vagina: Normal  No vaginal discharge, erythema or bleeding  Cervix: Normal       Uterus: Normal        Adnexa: Right adnexa normal and left adnexa normal    Musculoskeletal:         General: Normal range of motion  Cervical back: Normal range of motion  Lymphadenopathy:      Upper Body:      Right upper body: No supraclavicular, axillary or pectoral adenopathy  Left upper body: No supraclavicular, axillary or pectoral adenopathy  Lower Body: No right inguinal adenopathy  No left inguinal adenopathy  Skin:     General: Skin is warm and dry  Neurological:      Mental Status: She is alert and oriented to person, place, and time  Psychiatric:         Behavior: Behavior normal          Thought Content:  Thought content normal          Judgment: Judgment normal

## 2022-11-14 ENCOUNTER — TELEPHONE (OUTPATIENT)
Dept: PSYCHIATRY | Facility: CLINIC | Age: 42
End: 2022-11-14

## 2022-12-12 ENCOUNTER — TELEPHONE (OUTPATIENT)
Dept: PSYCHIATRY | Facility: CLINIC | Age: 42
End: 2022-12-12

## 2022-12-12 NOTE — TELEPHONE ENCOUNTER
DISCHARGE LETTER for Yajaira Alvares LCSW (certified and regular) placed in outgoing mail on 12/12/22      Article #:  [id number] [address]    Address:  [place] [id number]

## 2023-03-22 ENCOUNTER — DOCUMENTATION (OUTPATIENT)
Dept: PSYCHIATRY | Facility: CLINIC | Age: 43
End: 2023-03-22

## 2023-03-22 DIAGNOSIS — F41.9 ANXIETY: Primary | ICD-10-CM

## 2023-03-22 NOTE — PROGRESS NOTES
Psychotherapy Discharge Summary    Preferred Name: Berta Soto  YOB: 1980    Admission date to psychotherapy: 5/16/22    Referred by: PCP    Presenting Problem: Renzo Gould reports going through some life changes; just recently moved and is helping take care of ill sister  She feels like covid has brought on a lot of anxiety  and depression  Course of treatment included : individual therapy     Progress/Outcome of Treatment Goals (brief summary of course of treatment): Renzo Gould attended two sessions and did not schedule any further sessions  She did not respond to messages or letters sent to schedule and gauge interest in continuing treatment  Treatment Complications (if any): did not attend any further sessions, did not seem interested in continuing therapy    Treatment Progress: fair    Current [facility] Provider: N/A    Discharge Medications include:   Current Outpatient Medications:   •  diphenhydrAMINE (BENADRYL) 25 mg capsule, Take 25 mg by mouth every 6 (six) hours as needed for itching, Disp: , Rfl:   •  ibuprofen (MOTRIN) 200 mg tablet, Take by mouth every 6 (six) hours as needed for mild pain, Disp: , Rfl:   •  ipratropium (ATROVENT) 0 03 % nasal spray, 2 sprays into each nostril every 12 (twelve) hours, Disp: 30 mL, Rfl: 0      Discharge Date: 12/12/22    Discharge Diagnosis:   1  Anxiety            Criteria for Discharge: client did not attend any further sessions and did not respond to an interest letter    Aftercare recommendations include (include specific referral names and phone numbers, if appropriate): none other than to reach back out if she feels she needs to resume therapy sessions      Prognosis: fair

## 2023-09-14 ENCOUNTER — HOSPITAL ENCOUNTER (OUTPATIENT)
Dept: MAMMOGRAPHY | Facility: MEDICAL CENTER | Age: 43
Discharge: HOME | End: 2023-09-14
Payer: COMMERCIAL

## 2023-09-14 VITALS — WEIGHT: 158.95 LBS | HEIGHT: 65 IN | BODY MASS INDEX: 26.48 KG/M2

## 2023-09-14 DIAGNOSIS — Z12.31 ENCOUNTER FOR SCREENING MAMMOGRAM FOR MALIGNANT NEOPLASM OF BREAST: ICD-10-CM

## 2023-09-14 PROCEDURE — 77067 SCR MAMMO BI INCL CAD: CPT

## 2023-09-14 PROCEDURE — 77063 BREAST TOMOSYNTHESIS BI: CPT

## 2023-10-20 ENCOUNTER — DOCUMENTATION (OUTPATIENT)
Dept: FAMILY MEDICINE CLINIC | Facility: CLINIC | Age: 43
End: 2023-10-20

## 2023-10-20 ENCOUNTER — NURSE TRIAGE (OUTPATIENT)
Dept: OTHER | Facility: OTHER | Age: 43
End: 2023-10-20

## 2023-10-20 DIAGNOSIS — T78.40XA ALLERGIC REACTION, INITIAL ENCOUNTER: Primary | ICD-10-CM

## 2023-10-20 DIAGNOSIS — T78.40XA ALLERGIC REACTION, INITIAL ENCOUNTER: ICD-10-CM

## 2023-10-20 RX ORDER — EPINEPHRINE 0.3 MG/.3ML
0.3 INJECTION SUBCUTANEOUS ONCE
Qty: 0.6 ML | Refills: 0 | Status: SHIPPED | OUTPATIENT
Start: 2023-10-20 | End: 2023-10-20

## 2023-10-20 RX ORDER — EPINEPHRINE 0.3 MG/.3ML
0.3 INJECTION SUBCUTANEOUS ONCE
Qty: 0.6 ML | Refills: 0 | Status: SHIPPED | OUTPATIENT
Start: 2023-10-20 | End: 2023-10-20 | Stop reason: SDUPTHER

## 2023-10-20 NOTE — TELEPHONE ENCOUNTER
Regarding: Epi Pin Needed incase stung by Shital. ----- Message from Tobi Winchester sent at 10/20/2023  4:45 PM EDT -----  " I am allergic to PATIENTS' [facility] and Gilmar Vogel and they are flying around my house right now.  I am concerned because I do not have any more refills on my Epi Pen and it's not on my medication List."

## 2023-10-20 NOTE — TELEPHONE ENCOUNTER
Reason for Disposition   [1] Prescription refill request for ESSENTIAL medicine (i.e., likelihood of harm to patient if not taken) AND [2] triager unable to refill per department policy    Answer Assessment - Initial Assessment Questions  1. DRUG NAME: "What medicine do you need to have refilled?"      Epi pen  2. REFILLS REMAINING: "How many refills are remaining?" (Note: The label on the medicine or pill bottle will show how many refills are remaining. If there are no refills remaining, then a renewal may be needed.)      0  3. EXPIRATION DATE: "What is the expiration date?" (Note: The label states when the prescription will , and thus can no longer be refilled.)      2020    Protocols used: Medication Refill and Renewal Call-ADULT-    Pt needs refill of epi-pen for allergy to bee venom. Not in active med list. Paged on call provider. Per on call, can provide 1 refill but pt will need to go to appt in 2 weeks to get any further refills. Pt made aware and verbalized understanding. Pt prefers [facility] pharmacy so switched prescription there.

## 2023-10-30 ENCOUNTER — ANNUAL EXAM (OUTPATIENT)
Dept: OBGYN CLINIC | Facility: CLINIC | Age: 43
End: 2023-10-30
Payer: COMMERCIAL

## 2023-10-30 VITALS
WEIGHT: 153.6 LBS | SYSTOLIC BLOOD PRESSURE: 110 MMHG | DIASTOLIC BLOOD PRESSURE: 70 MMHG | BODY MASS INDEX: 25.59 KG/M2 | HEIGHT: 65 IN

## 2023-10-30 DIAGNOSIS — Z01.419 ENCOUNTER FOR WELL WOMAN EXAM WITH ROUTINE GYNECOLOGICAL EXAM: Primary | ICD-10-CM

## 2023-10-30 DIAGNOSIS — Z12.39 BREAST CANCER SCREENING, HIGH RISK PATIENT: ICD-10-CM

## 2023-10-30 DIAGNOSIS — N94.6 DYSMENORRHEA: ICD-10-CM

## 2023-10-30 DIAGNOSIS — R92.343 EXTREMELY DENSE TISSUE OF BOTH BREASTS ON MAMMOGRAPHY: ICD-10-CM

## 2023-10-30 PROCEDURE — S0612 ANNUAL GYNECOLOGICAL EXAMINA: HCPCS | Performed by: OBSTETRICS & GYNECOLOGY

## 2023-10-30 RX ORDER — NAPROXEN SODIUM 550 MG/1
550 TABLET ORAL
Qty: 15 TABLET | Refills: 11 | Status: SHIPPED | OUTPATIENT
Start: 2023-10-30 | End: 2023-11-04

## 2023-10-30 RX ORDER — AMOXICILLIN AND CLAVULANATE POTASSIUM 875; 125 MG/1; MG/1
TABLET, FILM COATED ORAL
COMMUNITY
Start: 2023-10-26

## 2023-10-30 NOTE — PROGRESS NOTES
[facility] [address] [place] [address] [address], [address], [place], [place]    ASSESSMENT/PLAN: Janna Vyas is a 41 y.o. [id number] who presents for annual gynecologic exam.    Encounter for routine gynecologic examination  - Routine well woman exam completed today. - Cervical Cancer Screening: Current ASCCP Guidelines reviewed. Last Pap: 2/2021. Pap every 3 years, due 2024  - Contraceptive counseling discussed. Current contraception: BS done with prior CS     Additional problems addressed during this visit:  1. Encounter for well woman exam with routine gynecological exam    2. Extremely dense tissue of both breasts on mammography  -     US breast screening bilateral complete (ABUS); Future  -     MRI Fast Breast; Future    3. Breast cancer screening, high risk patient  -     US breast screening bilateral complete (ABUS); Future  -     MRI Fast Breast; Future    4. Dysmenorrhea  -     naproxen sodium (ANAPROX) 550 mg tablet; Take 1 tablet (550 mg total) by mouth 3 (three) times a day with meals for 5 days      CC:  Annual Gynecologic Examination    HPI: Janna Vyas is a 41 y.o. [id number] who presents for annual gynecologic examination. Gynecologic Exam      She reports no new changes to her health. She reports no breast concerns. Mother had history of breast cancer, diagnosed at age 36, has been getting annual mammos since age 30. Discussed supplmental screening with ABUS or MRI. States she gets back pain with cycles, sometimes alleviated by Motrin. Will trial Naproxen    She gets regular periods. She has no vaginal discharge, vulvar or vaginal lesions, pelvic pain, or abnormal bleeding. She has no sexual health concerns and is currently sexually active with one male partner.       The following portions of the patient's history were reviewed and updated as appropriate: allergies, current medications, past family history, past medical history, obstetric history, gynecologic history, past social history, past surgical history and problem list.    Review of Systems   Constitutional: Negative. HENT: Negative. Eyes: Negative. Respiratory: Negative. Cardiovascular: Negative. Gastrointestinal: Negative. Genitourinary: Negative. Musculoskeletal: Negative. All other systems reviewed and are negative. Objective:  /70   Ht 5' 5" (1.651 m)   Wt 69.7 kg (153 lb 9.6 oz)   LMP 10/05/2023   BMI 25.56 kg/m²    Physical Exam  Vitals reviewed. Constitutional:       General: She is not in acute distress. Appearance: She is well-developed. HENT:      Head: Normocephalic and atraumatic. Nose: Nose normal.   Cardiovascular:      Rate and Rhythm: Normal rate. Pulmonary:      Effort: Pulmonary effort is normal. No respiratory distress. Chest:   Breasts:     Breasts are symmetrical.      Right: Normal. No mass, nipple discharge, skin change or tenderness. Left: Normal. No mass, nipple discharge, skin change or tenderness. Abdominal:      General: There is no distension. Palpations: Abdomen is soft. There is no mass. Tenderness: There is no abdominal tenderness. There is no guarding or rebound. Genitourinary:     General: Normal vulva. Exam position: Lithotomy position. Labia:         Right: No lesion. Left: No lesion. Urethra: No prolapse (urethral meatus normal). Vagina: Normal. No vaginal discharge, erythema or bleeding. Cervix: Normal.      Uterus: Normal.       Adnexa: Right adnexa normal and left adnexa normal.   Musculoskeletal:         General: Normal range of motion. Cervical back: Normal range of motion. Lymphadenopathy:      Upper Body:      Right upper body: No supraclavicular, axillary or pectoral adenopathy. Left upper body: No supraclavicular, axillary or pectoral adenopathy. Lower Body: No right inguinal adenopathy. No left inguinal adenopathy. Skin:     General: Skin is warm and dry.    Neurological: Mental Status: She is alert and oriented to person, place, and time. Psychiatric:         Behavior: Behavior normal.         Thought Content:  Thought content normal.         Judgment: Judgment normal.

## 2023-10-31 ENCOUNTER — TELEPHONE (OUTPATIENT)
Dept: FAMILY MEDICINE CLINIC | Facility: CLINIC | Age: 43
End: 2023-10-31

## 2023-10-31 NOTE — TELEPHONE ENCOUNTER
"Hi, this is [employer]. My whole family has had the flu type A which was confirmed when we took my five year old son to the hospital Monday night and the kids are finally going back to school today. We've had it since last Tuesday and I just can't seem to kick this one. And it's it's rare, but I woke up the last two mornings with tooth pain and usually when that happens I need help on this one because it's just so in the sinuses for so long that I'm not really sure what else to do. So and I need some energy to take care of these kiddos. So if you could call me back. My date of birth is [birth year]. [employer]. [place]. Hina Fonseca and I was going to come in and for a physical in the next couple of weeks, but I didn't know if there was a if I should be on antibiotic at this point. And I hate taking them when it's not, when it especially when it's viral, but that's usually what happens when I get to this point. So just give me a call back [phone]. Thanks. Bye"    Pt needs appt if she is still having issues.  LM to call us back if she's not feeling better for an appt or visit urgent care for further eval.

## 2023-11-08 ENCOUNTER — OFFICE VISIT (OUTPATIENT)
Dept: FAMILY MEDICINE CLINIC | Facility: CLINIC | Age: 43
End: 2023-11-08
Payer: COMMERCIAL

## 2023-11-08 VITALS
HEART RATE: 68 BPM | WEIGHT: 154.8 LBS | OXYGEN SATURATION: 98 % | DIASTOLIC BLOOD PRESSURE: 78 MMHG | HEIGHT: 65 IN | SYSTOLIC BLOOD PRESSURE: 110 MMHG | BODY MASS INDEX: 25.79 KG/M2 | TEMPERATURE: 97.6 F

## 2023-11-08 DIAGNOSIS — J30.2 SEASONAL ALLERGIES: ICD-10-CM

## 2023-11-08 DIAGNOSIS — M25.551 BILATERAL HIP PAIN: ICD-10-CM

## 2023-11-08 DIAGNOSIS — Z13.29 SCREENING FOR THYROID DISORDER: ICD-10-CM

## 2023-11-08 DIAGNOSIS — Z13.220 SCREENING, LIPID: ICD-10-CM

## 2023-11-08 DIAGNOSIS — Z00.00 ANNUAL PHYSICAL EXAM: Primary | ICD-10-CM

## 2023-11-08 DIAGNOSIS — K58.0 IRRITABLE BOWEL SYNDROME WITH DIARRHEA: ICD-10-CM

## 2023-11-08 DIAGNOSIS — Z13.0 SCREENING FOR DEFICIENCY ANEMIA: ICD-10-CM

## 2023-11-08 DIAGNOSIS — Z13.1 SCREENING FOR DIABETES MELLITUS: ICD-10-CM

## 2023-11-08 DIAGNOSIS — M25.552 BILATERAL HIP PAIN: ICD-10-CM

## 2023-11-08 DIAGNOSIS — M54.50 CHRONIC BILATERAL LOW BACK PAIN WITHOUT SCIATICA: ICD-10-CM

## 2023-11-08 DIAGNOSIS — G89.29 CHRONIC BILATERAL LOW BACK PAIN WITHOUT SCIATICA: ICD-10-CM

## 2023-11-08 DIAGNOSIS — Z11.59 NEED FOR HEPATITIS C SCREENING TEST: ICD-10-CM

## 2023-11-08 DIAGNOSIS — Z11.4 SCREENING FOR HIV (HUMAN IMMUNODEFICIENCY VIRUS): ICD-10-CM

## 2023-11-08 PROBLEM — M35.00 SICCA SYNDROME (HCC): Status: ACTIVE | Noted: 2023-11-08

## 2023-11-08 PROCEDURE — 99396 PREV VISIT EST AGE 40-64: CPT | Performed by: NURSE PRACTITIONER

## 2023-11-08 RX ORDER — IPRATROPIUM BROMIDE 21 UG/1
2 SPRAY, METERED NASAL EVERY 12 HOURS
Qty: 30 ML | Refills: 0 | Status: SHIPPED | OUTPATIENT
Start: 2023-11-08

## 2023-11-08 NOTE — ASSESSMENT & PLAN NOTE
Back pain on/off for years. Notes b/l lateral hip pain for the last several months. 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 Has noted some improvement.   Referral today to PT for home exercise program

## 2023-11-08 NOTE — PATIENT INSTRUCTIONS
Wellness Visit for Adults   AMBULATORY CARE:   A wellness visit  is when you see your healthcare provider to get screened for health problems. Your healthcare provider will also give you advice on how to stay healthy. Write down your questions so you remember to ask them. Ask your healthcare provider how often you should have a wellness visit. What happens at a wellness visit:  Your healthcare provider will ask about your health, and your family history of health problems. This includes high blood pressure, heart disease, and cancer. He or she will ask if you have symptoms that concern you, if you smoke, and about your mood. You may also be asked about your intake of medicines, supplements, food, and alcohol. Any of the following may be done: Your weight  will be checked. Your height may also be checked so your body mass index (BMI) can be calculated. Your BMI shows if you are at a healthy weight. Your blood pressure  and heart rate will be checked. Your temperature may also be checked. Blood and urine tests  may be done. Blood tests may be done to check your cholesterol levels. Abnormal cholesterol levels increase your risk for heart disease and stroke. You may also need a blood or urine test to check for diabetes if you are at increased risk. Urine tests may be done to look for signs of an infection or kidney disease. A physical exam  includes checking your heartbeat and lungs with a stethoscope. Your healthcare provider may also check your skin to look for sun damage. Screening tests  may be recommended. A screening test is done to check for diseases that may not cause symptoms. The screening tests you may need depend on your age, gender, family history, and lifestyle habits. For example, colorectal screening may be recommended if you are 48years old or older. Screening tests you need if you are a woman:   A Pap smear  is used to screen for cervical cancer.  Pap smears are usually done every 3 to 5 years depending on your age. You may need them more often if you have had abnormal Pap smear test results in the past. Ask your healthcare provider how often you should have a Pap smear. A mammogram  is an x-ray of your breasts to screen for breast cancer. Experts recommend mammograms every 2 years starting at age 48 years. You may need a mammogram at age 52 years or younger if you have an increased risk for breast cancer. Talk to your healthcare provider about when you should start having mammograms and how often you need them. Vaccines you may need:   Get an influenza vaccine  every year. The influenza vaccine protects you from the flu. Several types of viruses cause the flu. The viruses change over time, so new vaccines are made each year. Get a tetanus-diphtheria (Td) booster vaccine  every 10 years. This vaccine protects you against tetanus and diphtheria. Tetanus is a severe infection that may cause painful muscle spasms and lockjaw. Diphtheria is a severe bacterial infection that causes a thick covering in the back of your mouth and throat. Get a human papillomavirus (HPV) vaccine  if you are female and aged 23 to 32 or male 23 to 24 and never received it. This vaccine protects you from HPV infection. HPV is the most common infection spread by sexual contact. HPV may also cause vaginal, penile, and anal cancers. Get a pneumococcal vaccine  if you are aged 72 years or older. The pneumococcal vaccine is an injection given to protect you from pneumococcal disease. Pneumococcal disease is an infection caused by pneumococcal bacteria. The infection may cause pneumonia, meningitis, or an ear infection. Get a shingles vaccine  if you are 60 or older, even if you have had shingles before. The shingles vaccine is an injection to protect you from the varicella-zoster virus. This is the same virus that causes chickenpox.  Shingles is a painful rash that develops in people who had chickenpox or have been exposed to the virus. How to eat healthy:  My Plate is a model for planning healthy meals. It shows the types and amounts of foods that should go on your plate. Fruits and vegetables make up about half of your plate, and grains and protein make up the other half. A serving of dairy is included on the side of your plate. The amount of calories and serving sizes you need depends on your age, gender, weight, and height. Examples of healthy foods are listed below:  Eat a variety of vegetables  such as dark green, red, and orange vegetables. You can also include canned vegetables low in sodium (salt) and frozen vegetables without added butter or sauces. Eat a variety of fresh fruits , canned fruit in 100% juice, frozen fruit, and dried fruit. Include whole grains. At least half of the grains you eat should be whole grains. Examples include whole-wheat bread, wheat pasta, brown rice, and whole-grain cereals such as oatmeal.    Eat a variety of protein foods such as seafood (fish and shellfish), lean meat, and poultry without skin (turkey and chicken). Examples of lean meats include pork leg, shoulder, or tenderloin, and beef round, sirloin, tenderloin, and extra lean ground beef. Other protein foods include eggs and egg substitutes, beans, peas, soy products, nuts, and seeds. Choose low-fat dairy products such as skim or 1% milk or low-fat yogurt, cheese, and cottage cheese. Limit unhealthy fats  such as butter, hard margarine, and shortening. Exercise:  Exercise at least 30 minutes per day on most days of the week. Some examples of exercise include walking, biking, dancing, and swimming. You can also fit in more physical activity by taking the stairs instead of the elevator or parking farther away from stores. Include muscle strengthening activities 2 days each week. Regular exercise provides many health benefits.  It helps you manage your weight, and decreases your risk for type 2 diabetes, heart disease, stroke, and high blood pressure. Exercise can also help improve your mood. Ask your healthcare provider about the best exercise plan for you. General health and safety guidelines:   Do not smoke. Nicotine and other chemicals in cigarettes and cigars can cause lung damage. Ask your healthcare provider for information if you currently smoke and need help to quit. E-cigarettes or smokeless tobacco still contain nicotine. Talk to your healthcare provider before you use these products. Limit alcohol. A drink of alcohol is 12 ounces of beer, 5 ounces of wine, or 1½ ounces of liquor. Lose weight, if needed. Being overweight increases your risk of certain health conditions. These include heart disease, high blood pressure, type 2 diabetes, and certain types of cancer. Protect your skin. Do not sunbathe or use tanning beds. Use sunscreen with a SPF 15 or higher. Apply sunscreen at least 15 minutes before you go outside. Reapply sunscreen every 2 hours. Wear protective clothing, hats, and sunglasses when you are outside. Drive safely. Always wear your seatbelt. Make sure everyone in your car wears a seatbelt. A seatbelt can save your life if you are in an accident. Do not use your cell phone when you are driving. This could distract you and cause an accident. Pull over if you need to make a call or send a text message. Practice safe sex. Use latex condoms if are sexually active and have more than one partner. Your healthcare provider may recommend screening tests for sexually transmitted infections (STIs). Wear helmets, lifejackets, and protective gear. Always wear a helmet when you ride a bike or motorcycle, go skiing, or play sports that could cause a head injury. Wear protective equipment when you play sports. Wear a lifejacket when you are on a boat or doing water sports.     © Copyright [facility] 2023 Information is for End User's use only and may not be sold, redistributed or otherwise used for commercial purposes. The above information is an  only. It is not intended as medical advice for individual conditions or treatments. Talk to your doctor, nurse or pharmacist before following any medical regimen to see if it is safe and effective for you.

## 2023-11-08 NOTE — ASSESSMENT & PLAN NOTE
Transition care from [facility]  Would like to establish within Network  Symptoms fairly well managed with Hyoscyamine  Rx refilled today

## 2023-11-08 NOTE — PROGRESS NOTES
ADULT ANNUAL [facility]    NAME: Layla Francis  AGE: 41 y.o. SEX: female  : 1980     DATE: 2023     Assessment and Plan:   Comprehensive physical exam  PMH and chronic conditions reviewed  Medications reconciled  Preventative care and recommended screenings as below  Continue annual physicals  Follow-up sooner as needed  Problem List Items Addressed This Visit          Digestive    IBS (irritable bowel syndrome)     Transition care from [facility]  Would like to establish within Network  Symptoms fairly well managed with Hyoscyamine  Rx refilled today         Relevant Medications    hyoscyamine (LEVSIN/SL) 0.125 mg SL tablet    Other Relevant Orders    Ambulatory Referral to Gastroenterology       Other    Chronic bilateral low back pain without sciatica     Back pain on/off for years. Notes b/l lateral hip pain for the last several months. 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 Has noted some improvement.   Referral today to PT for home exercise program         Relevant Orders    Ambulatory Referral to Physical Therapy     Other Visit Diagnoses       Annual physical exam    -  Primary    Bilateral hip pain        Relevant Orders    Ambulatory Referral to Physical Therapy    Screening for deficiency anemia        Relevant Orders    CBC and differential    Screening for diabetes mellitus        Relevant Orders    Comprehensive metabolic panel    Screening for thyroid disorder        Relevant Orders    TSH, 3rd generation with Free T4 reflex    Screening, lipid        Relevant Orders    Lipid panel    Need for hepatitis C screening test        Relevant Orders    Hepatitis C antibody    Screening for HIV (human immunodeficiency virus)        Relevant Orders    HIV 1/2 AB/AG w Reflex [facility] for 2 yr old and above    Seasonal allergies        Relevant Medications    ipratropium (ATROVENT) 0.03 % nasal spray            Immunizations and preventive care screenings were discussed with patient today. Appropriate education was printed on patient's after visit summary. Counseling:  Alcohol/drug use: discussed moderation in alcohol intake, the recommendations for healthy alcohol use, and avoidance of illicit drug use. Dental Health: discussed importance of regular tooth brushing, flossing, and dental visits. Injury prevention: discussed safety/seat belts, safety helmets, smoke detectors, carbon dioxide detectors, and smoking near bedding or upholstery. Sexual health: discussed sexually transmitted diseases, partner selection, use of condoms, avoidance of unintended pregnancy, and contraceptive alternatives. Exercise: the importance of regular exercise/physical activity was discussed. Recommend exercise 3-5 times per week for at least 30 minutes. BMI Counseling: Body mass index is 25.96 kg/m². The BMI is above normal. Nutrition recommendations include moderation in carbohydrate intake and reducing intake of saturated and trans fat. Exercise recommendations include moderate physical activity 150 minutes/week. Rationale for BMI follow-up plan is due to patient being overweight or obese. Healthy lifestyle counseling  Has been working more over the last few weeks  Feeling well    Depression Screening and Follow-up Plan: Patient was screened for depression during today's encounter. They screened negative with a PHQ-2 score of 0. Return in about 1 year (around 11/8/2024) for Annual physical.     Chief Complaint:     Chief Complaint   Patient presents with    Physical Exam      History of Present Illness:     Adult Annual Physical   Patient here for a comprehensive physical exam. The patient reports  as above . Diet and Physical Activity  Diet/Nutrition: well balanced diet. Exercise: walking and moderate cardiovascular exercise.       Depression Screening  PHQ-2/9 Depression Screening    Little interest or pleasure in doing things: 0 - not at all  Feeling down, depressed, or hopeless: 0 - not at all  PHQ-2 Score: 0  PHQ-2 Interpretation: Negative depression screen       General Health  Sleep: sleeps well. Hearing: normal - none . Vision: no vision problems and goes for regular eye exams. Dental: regular dental visits. Immunizations: Tdap 2018; influenza in next few weeks     /GYN Health  Patient is: premenopausal  Last menstrual period:  - regular  Contraceptive method:  . [facility] UTD  Mammo completed - MRI ordered ([facility]: 33.27 %)  Continue monthly self breast exams  Advise daily vitamin/immune support    Advanced Care Planning  Do you have an advanced directive? no  Do you have a durable medical power of ? no     Review of Systems:     Review of Systems   Constitutional: Negative. HENT: Negative. Eyes: Negative. Respiratory: Negative. Cardiovascular: Negative. Gastrointestinal: Negative. IBS    Genitourinary: Negative. Musculoskeletal:  Positive for arthralgias and back pain. Skin: Negative. Neurological: Negative. Psychiatric/Behavioral: Negative.         Past Medical History:     Past Medical History:   Diagnosis Date    Abnormal ECG     Abnormal Pap smear of cervix     Allergic     Seasonal, skin in right nostril unhealing fissure, improved    Arthritis 2021    Unable to open jars or use thimbs without sharp pain    Depression     IBS (irritable bowel syndrome)     Ovarian cyst     Varicella     As a child      Past Surgical History:     Past Surgical History:   Procedure Laterality Date     SECTION      x 2    CLAVICLE SURGERY Left     SALPINGECTOMY Bilateral 2018      Social History:     Social History     Socioeconomic History    Marital status: /Civil Union     Spouse name: None    Number of children: None    Years of education: None    Highest education level: None   Occupational History    None   Tobacco Use    Smoking status: Never    Smokeless tobacco: Never   Vaping Use    Vaping Use: Never used   Substance and Sexual Activity    Alcohol use:  Yes     Alcohol/week: 4.0 standard drinks of alcohol     Types: 4 Glasses of wine per week     Comment: 1 glass of wine per night    Drug use: Not Currently    Sexual activity: Yes     Partners: Male     Birth control/protection: Female Sterilization   Other Topics Concern    None   Social History Narrative    None     Social Determinants of Health     Financial Resource Strain: Not on file   Food Insecurity: Not on file   Transportation Needs: Not on file   Physical Activity: Not on file   Stress: Not on file   Social Connections: Not on file   Intimate Partner Violence: Not on file   Housing Stability: Not on file      Family History:     Family History   Problem Relation Age of Onset    Breast cancer Mother 36    Rheum arthritis Mother     Throat cancer Mother     Cancer Mother         Breast and oropharyngeal CA    Hypertension Father     Heart disease Father     Sleep apnea Father     Depression Father         Undiagnosed specific    Anxiety disorder Father     Deep vein thrombosis Father     Rashes / Skin problems Father         Multiple precancerous spots removed    Cancer Sister 36        Multiple myeloma diagnosed feb this year    No Known Problems Daughter     Breast cancer Maternal Grandmother 79    No Known Problems Maternal Grandfather     No Known Problems Paternal Grandmother     No Known Problems Paternal Grandfather     No Known Problems Maternal Aunt     No Known Problems Maternal Aunt     No Known Problems Paternal Aunt     No Known Problems Paternal Aunt       Current Medications:     Current Outpatient Medications   Medication Sig Dispense Refill    diphenhydrAMINE (BENADRYL) 25 mg capsule Take 25 mg by mouth every 6 (six) hours as needed for itching      EPINEPHrine (EPIPEN) 0.3 mg/0.3 mL SOAJ Inject 0.3 mL (0.3 mg total) into a muscle once for 1 dose 0.6 mL 0    hyoscyamine (LEVSIN/SL) 0.125 mg SL tablet Take 1 tablet (0.125 mg total) by mouth every 4 (four) hours as needed for cramping or diarrhea 30 tablet 0    ibuprofen (MOTRIN) 200 mg tablet Take by mouth every 6 (six) hours as needed for mild pain      ipratropium (ATROVENT) 0.03 % nasal spray 2 sprays into each nostril every 12 (twelve) hours 30 mL 0    naproxen sodium (ANAPROX) 550 mg tablet Take 1 tablet (550 mg total) by mouth 3 (three) times a day with meals for 5 days 15 tablet 11     No current facility-administered medications for this visit. Allergies: Allergies   Allergen Reactions    Bee Venom Swelling    Sulfamethoxazole-Trimethoprim Rash     rash      Physical Exam:     /78 (BP Location: Left arm, Patient Position: Sitting, Cuff Size: Adult)   Pulse 68   Temp 97.6 °F (36.4 °C)   Ht 5' 4.75" (1.645 m)   Wt 70.2 kg (154 lb 12.8 oz)   LMP 11/05/2023 (Exact Date)   SpO2 98%   BMI 25.96 kg/m²     Physical Exam  Vitals and nursing note reviewed. Constitutional:       General: She is not in acute distress. Appearance: Normal appearance. She is well-developed and well-groomed. She is not ill-appearing. HENT:      Head: Normocephalic. Right Ear: Tympanic membrane, ear canal and external ear normal.      Left Ear: Tympanic membrane, ear canal and external ear normal.      Nose: Nose normal.      Mouth/Throat:      Mouth: Mucous membranes are moist.      Pharynx: Oropharynx is clear. Eyes:      Conjunctiva/sclera: Conjunctivae normal.      Pupils: Pupils are equal, round, and reactive to light. Neck:      Thyroid: No thyromegaly. Vascular: No carotid bruit. Cardiovascular:      Rate and Rhythm: Normal rate and regular rhythm. Pulses:           Posterior tibial pulses are 2+ on the right side and 2+ on the left side. Heart sounds: Normal heart sounds. Pulmonary:      Effort: Pulmonary effort is normal. No respiratory distress.       Breath sounds: Normal breath sounds and air entry. Abdominal:      General: Bowel sounds are normal.      Palpations: Abdomen is soft. Tenderness: There is no abdominal tenderness. Musculoskeletal:      Right lower leg: No edema. Left lower leg: No edema. Lymphadenopathy:      Cervical: No cervical adenopathy. Skin:     General: Skin is warm and dry. Neurological:      General: No focal deficit present. Mental Status: She is alert and oriented to person, place, and time. Psychiatric:         Attention and Perception: Attention normal.         Mood and Affect: Mood normal.         Behavior: Behavior normal.         Thought Content:  Thought content normal.         Judgment: Judgment normal.          SWETA Lambert  [address]

## 2023-11-16 ENCOUNTER — EVALUATION (OUTPATIENT)
Dept: PHYSICAL THERAPY | Facility: CLINIC | Age: 43
End: 2023-11-16
Payer: COMMERCIAL

## 2023-11-16 ENCOUNTER — APPOINTMENT (OUTPATIENT)
Dept: LAB | Facility: CLINIC | Age: 43
End: 2023-11-16
Payer: COMMERCIAL

## 2023-11-16 DIAGNOSIS — M25.551 BILATERAL HIP PAIN: ICD-10-CM

## 2023-11-16 DIAGNOSIS — Z13.29 SCREENING FOR THYROID DISORDER: ICD-10-CM

## 2023-11-16 DIAGNOSIS — Z11.4 SCREENING FOR HIV (HUMAN IMMUNODEFICIENCY VIRUS): ICD-10-CM

## 2023-11-16 DIAGNOSIS — M25.552 BILATERAL HIP PAIN: ICD-10-CM

## 2023-11-16 DIAGNOSIS — Z11.59 NEED FOR HEPATITIS C SCREENING TEST: ICD-10-CM

## 2023-11-16 DIAGNOSIS — Z13.1 SCREENING FOR DIABETES MELLITUS: ICD-10-CM

## 2023-11-16 DIAGNOSIS — G89.29 CHRONIC BILATERAL LOW BACK PAIN WITHOUT SCIATICA: Primary | ICD-10-CM

## 2023-11-16 DIAGNOSIS — Z13.220 SCREENING, LIPID: ICD-10-CM

## 2023-11-16 DIAGNOSIS — Z13.0 SCREENING FOR DEFICIENCY ANEMIA: ICD-10-CM

## 2023-11-16 DIAGNOSIS — M54.50 CHRONIC BILATERAL LOW BACK PAIN WITHOUT SCIATICA: Primary | ICD-10-CM

## 2023-11-16 LAB
ALBUMIN SERPL BCP-MCNC: 4.3 G/DL (ref 3.5–5)
ALP SERPL-CCNC: 44 U/L (ref 34–104)
ALT SERPL W P-5'-P-CCNC: 11 U/L (ref 7–52)
ANION GAP SERPL CALCULATED.3IONS-SCNC: 9 MMOL/L
AST SERPL W P-5'-P-CCNC: 21 U/L (ref 13–39)
BASOPHILS # BLD AUTO: 0.02 THOUSANDS/ÂΜL (ref 0–0.1)
BASOPHILS NFR BLD AUTO: 0 % (ref 0–1)
BILIRUB SERPL-MCNC: 0.67 MG/DL (ref 0.2–1)
BUN SERPL-MCNC: 11 MG/DL (ref 5–25)
CALCIUM SERPL-MCNC: 9.6 MG/DL (ref 8.4–10.2)
CHLORIDE SERPL-SCNC: 105 MMOL/L (ref 96–108)
CHOLEST SERPL-MCNC: 192 MG/DL
CO2 SERPL-SCNC: 26 MMOL/L (ref 21–32)
CREAT SERPL-MCNC: 0.67 MG/DL (ref 0.6–1.3)
EOSINOPHIL # BLD AUTO: 0.1 THOUSAND/ÂΜL (ref 0–0.61)
EOSINOPHIL NFR BLD AUTO: 2 % (ref 0–6)
ERYTHROCYTE [DISTWIDTH] IN BLOOD BY AUTOMATED COUNT: 14 % (ref 11.6–15.1)
GFR SERPL CREATININE-BSD FRML MDRD: 109 ML/MIN/1.73SQ M
GLUCOSE P FAST SERPL-MCNC: 86 MG/DL (ref 65–99)
HCT VFR BLD AUTO: 38.2 % (ref 34.8–46.1)
HCV AB SER QL: NORMAL
HDLC SERPL-MCNC: 77 MG/DL
HGB BLD-MCNC: 12 G/DL (ref 11.5–15.4)
HIV 1+2 AB+HIV1 P24 AG SERPL QL IA: NORMAL
HIV 2 AB SERPL QL IA: NORMAL
HIV1 AB SERPL QL IA: NORMAL
HIV1 P24 AG SERPL QL IA: NORMAL
IMM GRANULOCYTES # BLD AUTO: 0.01 THOUSAND/UL (ref 0–0.2)
IMM GRANULOCYTES NFR BLD AUTO: 0 % (ref 0–2)
LDLC SERPL CALC-MCNC: 103 MG/DL (ref 0–100)
LYMPHOCYTES # BLD AUTO: 1.93 THOUSANDS/ÂΜL (ref 0.6–4.47)
LYMPHOCYTES NFR BLD AUTO: 39 % (ref 14–44)
MCH RBC QN AUTO: 28.4 PG (ref 26.8–34.3)
MCHC RBC AUTO-ENTMCNC: 31.4 G/DL (ref 31.4–37.4)
MCV RBC AUTO: 90 FL (ref 82–98)
MONOCYTES # BLD AUTO: 0.43 THOUSAND/ÂΜL (ref 0.17–1.22)
MONOCYTES NFR BLD AUTO: 9 % (ref 4–12)
NEUTROPHILS # BLD AUTO: 2.5 THOUSANDS/ÂΜL (ref 1.85–7.62)
NEUTS SEG NFR BLD AUTO: 50 % (ref 43–75)
NONHDLC SERPL-MCNC: 115 MG/DL
NRBC BLD AUTO-RTO: 0 /100 WBCS
PLATELET # BLD AUTO: 289 THOUSANDS/UL (ref 149–390)
PMV BLD AUTO: 11.1 FL (ref 8.9–12.7)
POTASSIUM SERPL-SCNC: 4.8 MMOL/L (ref 3.5–5.3)
PROT SERPL-MCNC: 7 G/DL (ref 6.4–8.4)
RBC # BLD AUTO: 4.23 MILLION/UL (ref 3.81–5.12)
SODIUM SERPL-SCNC: 140 MMOL/L (ref 135–147)
TRIGL SERPL-MCNC: 59 MG/DL
TSH SERPL DL<=0.05 MIU/L-ACNC: 1.87 UIU/ML (ref 0.45–4.5)
WBC # BLD AUTO: 4.99 THOUSAND/UL (ref 4.31–10.16)

## 2023-11-16 PROCEDURE — 36415 COLL VENOUS BLD VENIPUNCTURE: CPT

## 2023-11-16 PROCEDURE — 97110 THERAPEUTIC EXERCISES: CPT | Performed by: PHYSICAL THERAPIST

## 2023-11-16 PROCEDURE — 87389 HIV-1 AG W/HIV-1&-2 AB AG IA: CPT

## 2023-11-16 PROCEDURE — 86803 HEPATITIS C AB TEST: CPT

## 2023-11-16 PROCEDURE — 84443 ASSAY THYROID STIM HORMONE: CPT

## 2023-11-16 PROCEDURE — 97162 PT EVAL MOD COMPLEX 30 MIN: CPT | Performed by: PHYSICAL THERAPIST

## 2023-11-16 PROCEDURE — 85025 COMPLETE CBC W/AUTO DIFF WBC: CPT

## 2023-11-16 PROCEDURE — 80053 COMPREHEN METABOLIC PANEL: CPT

## 2023-11-16 PROCEDURE — 80061 LIPID PANEL: CPT

## 2023-11-16 NOTE — PROGRESS NOTES
PT Evaluation     Today's date: 2023  Patient name: Bridgette Justice  : 1980  MRN: [id number]  Referring provider: SWETA Corey  Dx:   Encounter Diagnosis     ICD-10-CM    1. Chronic bilateral low back pain without sciatica  M54.50 Ambulatory Referral to Physical Therapy    G89.29       2. Bilateral hip pain  M25.551 Ambulatory Referral to Physical Therapy    M25.552                      Assessment  Assessment details: Bridgette Justice is a 41 y.o. female presenting to physical therapy with chronic LBP and acute B/L hip pain and presents with pain, decreased range of motion, decreased strength, and decreased activity tolerance. Assessment reveals deep core weakness and significant hip mobility deficits into IR with proximal LE weakness. Secondary to these impairments, patient has increased difficulty performing ADL's, household chores, and  work related tasks. Tank Leon would benefit from skilled PT to address these issues and maximize function.   Thank you for the referral.    Impairments: abnormal muscle tone, abnormal or restricted ROM, abnormal movement, activity intolerance, impaired physical strength and pain with function  Understanding of Dx/Px/POC: excellent  Goals  Short Term Goals: to be achieved by 4 weeks  1) Patient to be independent with initial HEP  2) Decrease pain to < or equal to 5/10 at its worst  3) Increase lumbar spine ROM by 10% in all deficient planes  4) Increase LE strength by 1/2 MMT grade in all deficient planes  5) Patient to report decreased sleep interruption secondary to pain    Long Term Goals: to be achieved by discharge  1) Increase FOTO score > or equal to expected outcome  2) Patient to be independent with comprehensive HEP  3) Abolish pain for improved quality of life  4) Lumbar spine AROM WNL all planes to improve a/iadls  5) Increase LE strength to 5/5 MMT grade in all planes to improve a/iadls  6) Patient to report no sleep interruption secondary to pain    Plan  Patient would benefit from: skilled PT  Planned modality interventions: TENS and thermotherapy: hydrocollator packs  Planned therapy interventions: joint mobilization, manual therapy, neuromuscular re-education, patient education, strengthening, stretching, therapeutic exercise, home exercise program, ADL training and abdominal trunk stabilization  Frequency: 2x week  Duration in weeks: 8  Treatment plan discussed with: patient        Subjective Evaluation    History of Present Illness  Mechanism of injury: Patient reports to outpatient PT secondary to chronic LBP and acute B/L hip pain. 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 Patient describes the pain as achy/tight in the low back and sharp in the lateral hips which is worse with side lying, sitting, and improved with cycling/peleton and stretching. Patient is currently taking time off (As a nurse) and notes difficulty with work duties, ADL's and leisure functions as a result. Patients goals for PT are to decrease the pain and return to PLOF.             Recurrent probem    Quality of life: good    Patient Goals  Patient goals for therapy: increased strength, independence with ADLs/IADLs, return to sport/leisure activities, increased motion and decreased pain    Pain  Current pain ratin  At best pain ratin  At worst pain ratin  Quality: dull ache and tight  Relieving factors: rest and relaxation  Aggravating factors: sitting  Progression: worsening    Social Support  Steps to enter house: yes  Stairs in house: yes   Lives in: multiple-level home    Employment status: working    Diagnostic Tests  No diagnostic tests performed  Treatments  No previous or current treatments        Objective     Concurrent Complaints  Negative for night pain, disturbed sleep, bladder dysfunction, bowel dysfunction and saddle (S4) numbness    Postural Observations  Seated posture: fair  Standing posture: fair      Neurological Testing     Sensation     Lumbar   Left   Intact: light touch    Right   Intact: light touch    Reflexes   Left   Patellar (L4): normal (2+)  Achilles (S1): normal (2+)  Clonus sign: negative    Right   Patellar (L4): normal (2+)  Achilles (S1): normal (2+)  Clonus sign: negative    Active Range of Motion     Lumbar   Flexion:  WFL  Extension:  Restriction level: minimal  Left lateral flexion:  WFL  Right lateral flexion:  WFL  Left rotation:  WFL  Right rotation:  [facility]    Joint Play   Joints within functional limits: L1, L2, L3 and L4     Hypomobile: L5 and S1   Mechanical Assessment    Cervical      Thoracic      Lumbar    Standing flexion: repeated movements   Pain location:no change  Standing extension: repeated movements  Pain location: centralized  Pain intensity: better    Strength/Myotome Testing     Left Hip   Planes of Motion   Flexion: 4+  Extension: 4  Abduction: 4-  External rotation: 4  Internal rotation: 4-    Right Hip   Planes of Motion   Flexion: 4+  Extension: 4  Abduction: 4-  External rotation: 4  Internal rotation: 4-    Left Knee   Flexion: 5  Extension: 5    Right Knee   Flexion: 5  Extension: 5    Left Ankle/Foot   Dorsiflexion: 5  Plantar flexion: 5  Great toe extension: 5    Right Ankle/Foot   Dorsiflexion: 5  Plantar flexion: 5  Great toe extension: 5    Muscle Activation   Patient able to activate left transverse abdominals, left multifidus, right transverse abdominals and right multifidus. Additional Muscle Activation Details  TA: 4-/5    Multifidus: 4-/5    Tests     Lumbar     Left   Positive passive SLR and slump test.     Right   Positive passive SLR and slump test.     Left Pelvic Girdle/Sacrum   Negative: thigh thrust.     Right Pelvic Girdle/Sacrum   Negative: thigh thrust.     Left Hip   Negative TARA and FADIR.      Right Hip Negative TARA and FADIR.      Additional Tests Details  R hip IR: 20 degrees, L hip IR is 25 degrees    (+) piriformis tension    (+) Cedric test on right             Precautions: Hx IBS, Hx of depression, Chronic LBP      Manuals 11/16                                                                Neuro Re-Ed             Bird dogs 2x10 B/L            Paloff Press             Modified wall plank                                                                 Ther Ex             Recumbent bike warm up             Standing hip abduction - green 3x10 B/L            Prone press ups 10 x10"            Hip mobility routine HEP                                                                Ther Activity                                       Gait Training                                       Modalities

## 2023-11-21 ENCOUNTER — APPOINTMENT (OUTPATIENT)
Dept: PHYSICAL THERAPY | Facility: CLINIC | Age: 43
End: 2023-11-21
Payer: COMMERCIAL

## 2023-11-30 ENCOUNTER — OFFICE VISIT (OUTPATIENT)
Dept: PHYSICAL THERAPY | Facility: CLINIC | Age: 43
End: 2023-11-30
Payer: COMMERCIAL

## 2023-11-30 DIAGNOSIS — G89.29 CHRONIC BILATERAL LOW BACK PAIN WITHOUT SCIATICA: Primary | ICD-10-CM

## 2023-11-30 DIAGNOSIS — M25.551 BILATERAL HIP PAIN: ICD-10-CM

## 2023-11-30 DIAGNOSIS — M25.552 BILATERAL HIP PAIN: ICD-10-CM

## 2023-11-30 DIAGNOSIS — M54.50 CHRONIC BILATERAL LOW BACK PAIN WITHOUT SCIATICA: Primary | ICD-10-CM

## 2023-11-30 PROCEDURE — 97110 THERAPEUTIC EXERCISES: CPT | Performed by: PHYSICAL THERAPIST

## 2023-11-30 PROCEDURE — 97112 NEUROMUSCULAR REEDUCATION: CPT | Performed by: PHYSICAL THERAPIST

## 2023-11-30 NOTE — PROGRESS NOTES
Daily Note     Today's date: 2023  Patient name: Iggy Sher  : 1980  MRN: [id number]  Referring provider: SWETA Ayers  Dx: No diagnosis found. Subjective: Patient reports HEP compliance in addition to starting pilates. No significant change since IE thus far. Objective: See treatment diary below      Assessment: Proper form noted with her current HEP with increasing functional hip IR mobility noted B/L. Cues required to prevent anterior pelvic tilt with bird dogs, but improved form noted thereafter. Updated HEP according to progressions per appropriate form and mechanics. Plan: Continue per plan of care.       Precautions: Hx IBS, Hx of depression, Chronic LBP      Manuals                                                                Neuro Re-Ed             Bird dogs 2x10 B/L 2x10 B/L           Paloff Press - blue  2x10 x2" B/L           Modified wall plank  2x20                                                               Ther Ex             Upright bike warm up  L3 5'           Standing hip abduction - green 3x10 B/L 3x10 B/L           Prone press ups 10 x10" 10 x10"           Hip mobility routine - piriformis stretch into functional hip IR HEP 3x30" + 2x10 B/L           Side plank w/ glut medius recruitment  NV                                                  Ther Activity                                       Gait Training                                       Modalities

## 2023-12-06 ENCOUNTER — HOSPITAL ENCOUNTER (OUTPATIENT)
Dept: MRI IMAGING | Facility: HOSPITAL | Age: 43
Discharge: HOME | End: 2023-12-06
Attending: OBSTETRICS & GYNECOLOGY
Payer: COMMERCIAL

## 2023-12-06 DIAGNOSIS — R92.343 EXTREMELY DENSE TISSUE OF BOTH BREASTS ON MAMMOGRAPHY: ICD-10-CM

## 2023-12-06 DIAGNOSIS — Z12.39 BREAST CANCER SCREENING, HIGH RISK PATIENT: ICD-10-CM

## 2023-12-06 PROCEDURE — A9585 GADOBUTROL INJECTION: HCPCS | Performed by: OBSTETRICS & GYNECOLOGY

## 2023-12-06 RX ORDER — GADOBUTROL 604.72 MG/ML
7 INJECTION INTRAVENOUS
Status: COMPLETED | OUTPATIENT
Start: 2023-12-06 | End: 2023-12-06

## 2023-12-06 RX ADMIN — GADOBUTROL 7 ML: 604.72 INJECTION INTRAVENOUS at 13:27

## 2023-12-08 ENCOUNTER — OFFICE VISIT (OUTPATIENT)
Dept: PHYSICAL THERAPY | Facility: CLINIC | Age: 43
End: 2023-12-08
Payer: COMMERCIAL

## 2023-12-08 DIAGNOSIS — G89.29 CHRONIC BILATERAL LOW BACK PAIN WITHOUT SCIATICA: Primary | ICD-10-CM

## 2023-12-08 DIAGNOSIS — M25.551 BILATERAL HIP PAIN: ICD-10-CM

## 2023-12-08 DIAGNOSIS — M25.552 BILATERAL HIP PAIN: ICD-10-CM

## 2023-12-08 DIAGNOSIS — M54.50 CHRONIC BILATERAL LOW BACK PAIN WITHOUT SCIATICA: Primary | ICD-10-CM

## 2023-12-08 PROCEDURE — 97112 NEUROMUSCULAR REEDUCATION: CPT | Performed by: PHYSICAL THERAPIST

## 2023-12-08 PROCEDURE — 97110 THERAPEUTIC EXERCISES: CPT | Performed by: PHYSICAL THERAPIST

## 2023-12-08 NOTE — PROGRESS NOTES
Daily Note     Today's date: 2023  Patient name: Iggy Sher  : 1980  MRN: [id number]  Referring provider: SWETA Ayers  Dx:   Encounter Diagnosis     ICD-10-CM    1. Chronic bilateral low back pain without sciatica  M54.50     G89.29       2. Bilateral hip pain  M25.551     M25.552                      Subjective: Patient reports significant improvement since IE and is experiencing less overall discomfort. Objective: See treatment diary below      Assessment: Excellent tolerance to core exercises and proper mechanics noted. Progressed to include gluteus medius/oblique recruitment with side lying plank and patient performed properly with cueing. Overall patient continues to make significant progress toward goals. Plan: Continue per plan of care.       Precautions: Hx IBS, Hx of depression, Chronic LBP      Manuals                                                               Neuro Re-Ed             Bird dogs 2x10 B/L 2x10 B/L 2x10 B/L          Paloff Press - blue  2x10 x2" B/L 2x10 x2"          Modified wall plank  2x20 2x20                                                              Ther Ex             Upright bike warm up  L3 5' L3 5'          Standing hip abduction - green 3x10 B/L 3x10 B/L 3x10 B/L          Prone press ups 10 x10" 10 x10" 10 x10"          Hip mobility routine - piriformis stretch into functional hip IR HEP 3x30" + 2x10 B/L 3x30" + 2x10 B/L          Side plank w/ glut medius recruitment (knee bent)  NV 2 sets to fatigue B/L + leg lift                                                Ther Activity                                       Gait Training                                       Modalities

## 2023-12-14 ENCOUNTER — APPOINTMENT (OUTPATIENT)
Dept: PHYSICAL THERAPY | Facility: CLINIC | Age: 43
End: 2023-12-14
Payer: COMMERCIAL

## 2023-12-22 ENCOUNTER — APPOINTMENT (OUTPATIENT)
Dept: PHYSICAL THERAPY | Facility: CLINIC | Age: 43
End: 2023-12-22
Payer: COMMERCIAL

## 2024-01-03 ENCOUNTER — OFFICE VISIT (OUTPATIENT)
Dept: GASTROENTEROLOGY | Facility: CLINIC | Age: 44
End: 2024-01-03
Payer: COMMERCIAL

## 2024-01-03 VITALS
DIASTOLIC BLOOD PRESSURE: 67 MMHG | SYSTOLIC BLOOD PRESSURE: 101 MMHG | HEART RATE: 81 BPM | BODY MASS INDEX: 25.36 KG/M2 | OXYGEN SATURATION: 98 % | WEIGHT: 152.2 LBS | HEIGHT: 65 IN | TEMPERATURE: 98.3 F

## 2024-01-03 DIAGNOSIS — R19.7 DIARRHEA, UNSPECIFIED TYPE: Primary | ICD-10-CM

## 2024-01-03 DIAGNOSIS — K58.0 IRRITABLE BOWEL SYNDROME WITH DIARRHEA: ICD-10-CM

## 2024-01-03 DIAGNOSIS — R14.0 BLOATING: ICD-10-CM

## 2024-01-03 PROCEDURE — 99203 OFFICE O/P NEW LOW 30 MIN: CPT | Performed by: INTERNAL MEDICINE

## 2024-01-03 NOTE — PROGRESS NOTES
[facility] - Outpatient Consultation  Megan Richmond 43 y.o. female MRN: [id number]  Encounter: [id number]    HPI:    Megan Richmond is a 43 y.o. female who presents with complaint of loose stools and diarrhea.  She carries a diagnosis of IBS-D.    She was diagnosed with IBS-D years ago due to symptoms of abdominal pain and diarrhea.  She has not had endoscopic procedures.  Normally, she has 5 BMs per day, formed and soft.  Triggers for diarrhea include greasy foods, stress, and her menstrual period.  Dairy is not a trigger.  She also has a lot of bloating.  Has never tried the low FODMAP diet.      No GERD or dysphagia.      She was tested for celiac disease a long time ago, but I do not have the results.  She has hand pain, hip pain; doing PT.  She is +JANAK.  No skin rashes.  No mouth ulcers, no eye inflammation.  No smoking.  No FH of clon cancer.  No FH of IBD.      REVIEW OF SYSTEMS:  CONSTITUTIONAL: Denies any fever, chills, rigors, and weight loss.  HEENT: No earache or tinnitus. Denies hearing loss or visual disturbances.  CARDIOVASCULAR: No chest pain or palpitations.   RESPIRATORY: Denies any cough, hemoptysis, shortness of breath or dyspnea on exertion.  GASTROINTESTINAL: As noted in the History of Present Illness.   GENITOURINARY: No problems with urination. Denies any hematuria or dysuria.  NEUROLOGIC: No dizziness or vertigo, denies headaches.   MUSCULOSKELETAL: Denies any muscle or joint pain.   SKIN: Denies skin rashes or itching.   ENDOCRINE: Denies excessive thirst. Denies intolerance to heat or cold.  PSYCHOSOCIAL: Denies depression or anxiety. Denies any recent memory loss.     Historical Information   Past Medical History:   Diagnosis Date    Abnormal ECG     Abnormal Pap smear of cervix     Allergic     Seasonal, skin in right nostril unhealing fissure, improved    Arthritis 02/2021    Unable to open jars or use thimbs without sharp pain    Depression     IBS (irritable  bowel syndrome)     Ovarian cyst     Varicella     As a child     Past Surgical History:   Procedure Laterality Date     SECTION      x 2    CLAVICLE SURGERY Left 2001    SALPINGECTOMY Bilateral 2018     Social History   Social History     Substance and Sexual Activity   Alcohol Use Yes    Alcohol/week: 4.0 standard drinks of alcohol    Types: 4 Glasses of wine per week    Comment: 1 glass of wine per night     Social History     Substance and Sexual Activity   Drug Use Not Currently     Social History     Tobacco Use   Smoking Status Never   Smokeless Tobacco Never     Family History   Problem Relation Age of Onset    Breast cancer Mother 40        Brca negative    Rheum arthritis Mother     Throat cancer Mother     Cancer Mother         Breast and oropharyngeal CA    Arthritis Mother         Rheumatoid arthritis    Hyperlipidemia Mother         Controlled with a statin    Hypertension Father     Heart disease Father     Sleep apnea Father     Depression Father         Undiagnosed specific    Anxiety disorder Father     Deep vein thrombosis Father     Rashes / Skin problems Father         Multiple precancerous spots removed    Arthritis Father         Osteo, severe    Cancer Sister 40        Multiple myeloma diagnosed feb this year    No Known Problems Daughter     Breast cancer Maternal Grandmother 70    No Known Problems Maternal Grandfather     No Known Problems Paternal Grandmother     No Known Problems Paternal Grandfather     No Known Problems Maternal Aunt     No Known Problems Maternal Aunt     No Known Problems Paternal Aunt     No Known Problems Paternal Aunt        Meds/Allergies       Current Outpatient Medications:     diphenhydrAMINE (BENADRYL) 25 mg capsule    hyoscyamine (LEVSIN/SL) 0.125 mg SL tablet    ibuprofen (MOTRIN) 200 mg tablet    ipratropium (ATROVENT) 0.03 % nasal spray    EPINEPHrine (EPIPEN) 0.3 mg/0.3 mL SOAJ    naproxen sodium (ANAPROX) 550 mg tablet    Allergies   Allergen  "Reactions    Bee Venom Swelling    Sulfamethoxazole-Trimethoprim Rash     rash       Objective   Blood pressure 101/67, pulse 81, temperature 98.3 °F (36.8 °C), temperature source Tympanic, height 5' 5\" (1.651 m), weight 69 kg (152 lb 3.2 oz), last menstrual period 12/01/2023, SpO2 98%, not currently breastfeeding. Body mass index is 25.33 kg/m².  PHYSICAL EXAM:    General Appearance:   Alert, cooperative, no distress   HEENT:   Normocephalic, atraumatic, anicteric.     Neck:  Supple, symmetrical, trachea midline   Lungs:   Clear to auscultation bilaterally; no rales, rhonchi or wheezing; respirations unlabored    Heart::   Regular rate and rhythm; no murmur, rub, or gallop.   Abdomen:   Soft, mild LLQ ttp, non-distended; normal bowel sounds; no masses, no organomegaly    Genitalia:   Deferred    Rectal:   Deferred    Extremities:  No cyanosis, clubbing or edema    Pulses:  2+ and symmetric    Skin:  No jaundice, rashes, or lesions    Lymph nodes:  No palpable cervical lymphadenopathy        Lab Results:   No visits with results within 1 Day(s) from this visit.   Latest known visit with results is:   Appointment on 11/16/2023   Component Date Value    WBC 11/16/2023 4.99     RBC 11/16/2023 4.23     Hemoglobin 11/16/2023 12.0     Hematocrit 11/16/2023 38.2     MCV 11/16/2023 90     MCH 11/16/2023 28.4     MCHC 11/16/2023 31.4     RDW 11/16/2023 14.0     MPV 11/16/2023 11.1     Platelets 11/16/2023 289     nRBC 11/16/2023 0     Neutrophils Relative 11/16/2023 50     Immat GRANS % 11/16/2023 0     Lymphocytes Relative 11/16/2023 39     Monocytes Relative 11/16/2023 9     Eosinophils Relative 11/16/2023 2     Basophils Relative 11/16/2023 0     Neutrophils Absolute 11/16/2023 2.50     Immature Grans Absolute 11/16/2023 0.01     Lymphocytes Absolute 11/16/2023 1.93     Monocytes Absolute 11/16/2023 0.43     Eosinophils Absolute 11/16/2023 0.10     Basophils Absolute 11/16/2023 0.02     Sodium 11/16/2023 140     Potassium " "11/16/2023 4.8     Chloride 11/16/2023 105     CO2 11/16/2023 26     ANION GAP 11/16/2023 9     BUN 11/16/2023 11     Creatinine 11/16/2023 0.67     Glucose, Fasting 11/16/2023 86     Calcium 11/16/2023 9.6     AST 11/16/2023 21     ALT 11/16/2023 11     Alkaline Phosphatase 11/16/2023 44     Total Protein 11/16/2023 7.0     Albumin 11/16/2023 4.3     Total Bilirubin 11/16/2023 0.67     eGFR 11/16/2023 109     TSH 3RD GENERATON 11/16/2023 1.874     Cholesterol 11/16/2023 192     Triglycerides 11/16/2023 59     HDL, Direct 11/16/2023 77     LDL Calculated 11/16/2023 103 (H)     Non-HDL-Chol (CHOL-HDL) 11/16/2023 115     HIV-1 p24 Antigen 11/16/2023 Non-Reactive     HIV-1 Antibody 11/16/2023 Non-Reactive     HIV-2 Antibody 11/16/2023 Non-Reactive     HIV Ag-Ab 5th Gen 11/16/2023 Non-Reactive     Hepatitis C Ab 11/16/2023 Non-reactive        Lab Results   Component Value Date    WBC 4.99 11/16/2023    HGB 12.0 11/16/2023    HCT 38.2 11/16/2023    MCV 90 11/16/2023     11/16/2023       Lab Results   Component Value Date    SODIUM 140 11/16/2023    K 4.8 11/16/2023     11/16/2023    CO2 26 11/16/2023    AGAP 9 11/16/2023    BUN 11 11/16/2023    CREATININE 0.67 11/16/2023    GLUF 86 11/16/2023    CALCIUM 9.6 11/16/2023    AST 21 11/16/2023    ALT 11 11/16/2023    ALKPHOS 44 11/16/2023    TP 7.0 11/16/2023    TBILI 0.67 11/16/2023    EGFR 109 11/16/2023       No results found for: \"CRP\"    Lab Results   Component Value Date    OJX1ZGROXIVP 1.874 11/16/2023       No results found for: \"IRON\", \"TIBC\", \"FERRITIN\"    Radiology Results:   MRI Fast Breast    Result Date: 12/6/2023  Narrative: DIAGNOSIS: Extremely dense tissue of both breasts on mammography; Breast cancer screening, high risk patient  TECHNIQUE: Fast Breast MRI Screening Protocol MRI of both breasts was performed in axial planes utilizing a protocol specifically tailored for breast cancer screening. Pulse sequences included axial T2 STIR and axial 3D " fat suppressed gradient-echo T1 sequences (VIBRANT) before and after contrast administration. Subtraction and MIP images were generated.  MEDICATIONS ADMINISTERED: Gadobutrol injection (SINGLE-DOSE) SOLN 7 mL, Total Given: 7 mL (1 dose) Intravenous contrast was administered at 2cc/sec, without documented contrast reaction.  COMPARISONS: No prior breast MRI is available.  Prior mammograms were reviewed.  RELEVANT HISTORY: Family Breast Cancer History: History of breast cancer in Mother, Maternal Grandmother. Family Medical History: Family medical history includes breast cancer in 2 relatives (maternal grandmother, mother). Personal History: Hormone history includes birth control. No known relevant surgical history. No known relevant medical history. RISK ASSESSMENT: 5 Year Tyrer-Cuzick: 2.03 % 10 Year Tyrer-Cuzick: 4.97 % Lifetime Tyrer-Cuzick: 32.42 %   TISSUE DENSITY: FGT: The breasts have heterogeneous fibroglandular tissue. BPE: The background parenchymal enhancement is minimal and symmetric.  INDICATION: Megan Richmond is a 43 y.o. female presenting for Fast Breast MRI Screening.   FINDINGS: Bilateral There are no suspicious enhancing masses or suspicious areas of non-mass enhancement. There is no axillary or internal mammary adenopathy.      Impression:  1.  No MR evidence of malignancy in either breast. 2.  Patient will be due for annual screening mammography after 9/14/2024. 3.  Annual FAST breast MRI is recommended.  ASSESSMENT/BI-RADS CATEGORY: Left: 1 - Negative Right: 1 - Negative Overall: 1 - Negative  RECOMMENDATION:      - Continue annual screening mammography for both breasts.      - Fast Breast MRI in 1 year for both breasts.      ______________________________________________________________________  ASSESSMENT AND PLAN:    Megan Richmond is a 43 y.o. female chronic episodic diarrhea.  Triggered by greasy foods and stress; also worse during her menstrual period.  She also has bloating.  Chronic joint pain  with +JANAK, otherwise negative workup for RA.  I do suspect that she has IBS, but cannot rule out celiac disease.  I would also like to check stool calprotectin and Giardia antigen.    We discussed the low FODMAP diet and went over the instructions in detail.  She is interested in trying it.    Check stool for calprotectin and Giardia antigen.  2.   Check and IgA.  3.   Start low FODMAP diet.    Return in 3 months.    1. Diarrhea, unspecified type    2. Bloating    3. Irritable bowel syndrome with diarrhea        Orders Placed This Encounter   Procedures    Calprotectin,Fecal    Giardia antigen    Tissue transglutaminase, IgA    IgA

## 2024-02-02 ENCOUNTER — APPOINTMENT (OUTPATIENT)
Dept: LAB | Facility: CLINIC | Age: 44
End: 2024-02-02
Payer: COMMERCIAL

## 2024-02-02 DIAGNOSIS — R14.0 BLOATING: ICD-10-CM

## 2024-02-02 DIAGNOSIS — R19.7 DIARRHEA, UNSPECIFIED TYPE: ICD-10-CM

## 2024-02-02 LAB — IGA SERPL-MCNC: 143 MG/DL (ref 66–433)

## 2024-02-02 PROCEDURE — 36415 COLL VENOUS BLD VENIPUNCTURE: CPT

## 2024-02-02 PROCEDURE — 82784 ASSAY IGA/IGD/IGG/IGM EACH: CPT

## 2024-02-02 PROCEDURE — 86364 TISS TRNSGLTMNASE EA IG CLAS: CPT

## 2024-02-03 LAB — TTG IGA SER-ACNC: <2 U/ML (ref 0–3)

## 2024-02-14 ENCOUNTER — APPOINTMENT (OUTPATIENT)
Dept: LAB | Facility: CLINIC | Age: 44
End: 2024-02-14
Payer: COMMERCIAL

## 2024-02-14 PROCEDURE — 83993 ASSAY FOR CALPROTECTIN FECAL: CPT

## 2024-02-14 PROCEDURE — 87329 GIARDIA AG IA: CPT

## 2024-02-15 LAB — G LAMBLIA AG STL QL IA: NEGATIVE

## 2024-02-17 LAB — CALPROTECTIN STL-MCNT: 7 UG/G (ref 0–120)

## 2024-03-25 ENCOUNTER — PATIENT MESSAGE (OUTPATIENT)
Dept: GASTROENTEROLOGY | Facility: CLINIC | Age: 44
End: 2024-03-25

## 2024-03-25 DIAGNOSIS — K58.0 IRRITABLE BOWEL SYNDROME WITH DIARRHEA: ICD-10-CM

## 2024-03-25 DIAGNOSIS — R19.7 DIARRHEA, UNSPECIFIED TYPE: Primary | ICD-10-CM

## 2024-05-08 DIAGNOSIS — Z00.6 ENCOUNTER FOR EXAMINATION FOR NORMAL COMPARISON OR CONTROL IN CLINICAL RESEARCH PROGRAM: ICD-10-CM

## 2024-05-31 RX ORDER — CHOLESTYRAMINE 4 G/9G
1 POWDER, FOR SUSPENSION ORAL 2 TIMES DAILY WITH MEALS
Qty: 60 EACH | Refills: 3 | Status: SHIPPED | OUTPATIENT
Start: 2024-05-31

## 2024-06-03 ENCOUNTER — OFFICE VISIT (OUTPATIENT)
Dept: FAMILY MEDICINE CLINIC | Facility: CLINIC | Age: 44
End: 2024-06-03
Payer: COMMERCIAL

## 2024-06-03 VITALS
TEMPERATURE: 97.7 F | WEIGHT: 146 LBS | OXYGEN SATURATION: 99 % | BODY MASS INDEX: 24.3 KG/M2 | SYSTOLIC BLOOD PRESSURE: 118 MMHG | DIASTOLIC BLOOD PRESSURE: 80 MMHG | HEART RATE: 73 BPM

## 2024-06-03 DIAGNOSIS — M79.675 PAIN OF TOE OF LEFT FOOT: Primary | ICD-10-CM

## 2024-06-03 PROCEDURE — 99213 OFFICE O/P EST LOW 20 MIN: CPT | Performed by: NURSE PRACTITIONER

## 2024-06-03 RX ORDER — METHYLPREDNISOLONE 4 MG/1
TABLET ORAL
Qty: 21 EACH | Refills: 0 | Status: SHIPPED | OUTPATIENT
Start: 2024-06-03

## 2024-06-03 RX ORDER — IPRATROPIUM BROMIDE 21 UG/1
2 SPRAY, METERED NASAL EVERY 12 HOURS
Qty: 30 ML | Refills: 0 | Status: CANCELLED | OUTPATIENT
Start: 2024-06-03

## 2024-06-03 NOTE — PROGRESS NOTES
Ambulatory Visit  Name: Megan Richmond      : 1980      MRN: [id number]  Encounter Provider: SWETA Church  Encounter Date: 6/3/2024   Encounter department: [facility]    Assessment & Plan   1. Pain of toe of left foot  Assessment & Plan:  Unclear etiology of patient's concern today  Physical assessment unremarkable  Does not appear imaging is warranted at this time  We will trial Medrol pack  Referral to podiatry for further evaluation    Referral in for physical therapy if podiatry deems beneficial    Orders:  -     methylPREDNISolone 4 MG tablet therapy pack; Use as directed on package  -     Ambulatory Referral to Podiatry; Future  -     Ambulatory Referral to Physical Therapy; Future       History of Present Illness     Acute visit  Presents today with complaint of left great toe pain.  Started about 1.5 months ago.  Symptoms wax/wane. She describes the pain as nervelike. Encompasses the whole toe, and she can also re create the pain by tapping on her skin along the top of her foot. Reports the pain is aggravated by certain foot movements; especially in yoga.    She additionally has a history of left knee pain, which has been slowly improving with PT.  She initially thought maybe the toe pain was created by compensating for the knee pain with walking.   She denies any known injury or trauma. She has been taking OTC Ibuprofen recent d/t menstrual pain and has noted no improvement with the toe pain        Review of Systems   Musculoskeletal:  Negative for gait problem.        Toe pain as in HPI     Medical History Reviewed by provider this encounter:  Tobacco  Allergies  Meds  Problems  Med Hx  Surg Hx  Fam Hx       Current Outpatient Medications on File Prior to Visit   Medication Sig Dispense Refill    cholestyramine (QUESTRAN) 4 g packet Take 1 packet (4 g total) by mouth 2 (two) times a day with meals 60 each 3    diphenhydrAMINE (BENADRYL) 25 mg capsule Take 25 mg  by mouth every 6 (six) hours as needed for itching      hyoscyamine (LEVSIN/SL) 0.125 mg SL tablet Take 1 tablet (0.125 mg total) by mouth every 4 (four) hours as needed for cramping or diarrhea 30 tablet 0    ibuprofen (MOTRIN) 200 mg tablet Take by mouth every 6 (six) hours as needed for mild pain      ipratropium (ATROVENT) 0.03 % nasal spray 2 sprays into each nostril every 12 (twelve) hours 30 mL 0    EPINEPHrine (EPIPEN) 0.3 mg/0.3 mL SOAJ Inject 0.3 mL (0.3 mg total) into a muscle once for 1 dose 0.6 mL 0    naproxen sodium (ANAPROX) 550 mg tablet Take 1 tablet (550 mg total) by mouth 3 (three) times a day with meals for 5 days 15 tablet 11     No current facility-administered medications on file prior to visit.      Social History     Tobacco Use    Smoking status: Never    Smokeless tobacco: Never   Vaping Use    Vaping status: Never Used   Substance and Sexual Activity    Alcohol use: Yes     Alcohol/week: 4.0 standard drinks of alcohol     Types: 4 Glasses of wine per week     Comment: 1 glass of wine per night    Drug use: Not Currently    Sexual activity: Yes     Partners: Male     Birth control/protection: Female Sterilization     Objective     /80 (BP Location: Left arm, Patient Position: Sitting, Cuff Size: Standard)   Pulse 73   Temp 97.7 °F (36.5 °C) (Temporal)   Wt 66.2 kg (146 lb)   SpO2 99%   BMI 24.30 kg/m²     Physical Exam  Vitals and nursing note reviewed.   Constitutional:       General: She is not in acute distress.     Appearance: Normal appearance. She is not ill-appearing.   Feet:      Comments: Right great toe with no warmth or redness.  Skin intact  Full ROM with pain  Pulses intact  Good cap refill  Skin:     General: Skin is warm and dry.   Neurological:      General: No focal deficit present.      Mental Status: She is alert and oriented to person, place, and time.   Psychiatric:         Attention and Perception: Attention normal.         Mood and Affect: Mood normal.        Administrative Statements

## 2024-06-03 NOTE — ASSESSMENT & PLAN NOTE
Unclear etiology of patient's concern today  Physical assessment unremarkable  Does not appear imaging is warranted at this time  We will trial Medrol pack  Referral to podiatry for further evaluation    Referral in for physical therapy if podiatry deems beneficial

## 2024-07-17 ENCOUNTER — OFFICE VISIT (OUTPATIENT)
Dept: PODIATRY | Facility: CLINIC | Age: 44
End: 2024-07-17
Payer: COMMERCIAL

## 2024-07-17 VITALS
DIASTOLIC BLOOD PRESSURE: 73 MMHG | WEIGHT: 146 LBS | BODY MASS INDEX: 24.32 KG/M2 | SYSTOLIC BLOOD PRESSURE: 100 MMHG | HEIGHT: 65 IN

## 2024-07-17 DIAGNOSIS — G57.82 SAPHENOUS NERVE NEUROPATHY, LEFT: Primary | ICD-10-CM

## 2024-07-17 DIAGNOSIS — M79.675 PAIN OF TOE OF LEFT FOOT: ICD-10-CM

## 2024-07-17 PROCEDURE — 99203 OFFICE O/P NEW LOW 30 MIN: CPT | Performed by: PODIATRIST

## 2024-07-17 PROCEDURE — 64450 NJX AA&/STRD OTHER PN/BRANCH: CPT | Performed by: PODIATRIST

## 2024-07-17 RX ORDER — LIDOCAINE HYDROCHLORIDE 10 MG/ML
2 INJECTION, SOLUTION EPIDURAL; INFILTRATION; INTRACAUDAL; PERINEURAL ONCE
Status: COMPLETED | OUTPATIENT
Start: 2024-07-17 | End: 2024-07-17

## 2024-07-17 RX ORDER — TRIAMCINOLONE ACETONIDE 40 MG/ML
20 INJECTION, SUSPENSION INTRA-ARTICULAR; INTRAMUSCULAR ONCE
Status: COMPLETED | OUTPATIENT
Start: 2024-07-17 | End: 2024-07-17

## 2024-07-17 RX ADMIN — TRIAMCINOLONE ACETONIDE 20 MG: 40 INJECTION, SUSPENSION INTRA-ARTICULAR; INTRAMUSCULAR at 16:10

## 2024-07-17 RX ADMIN — LIDOCAINE HYDROCHLORIDE 2 ML: 10 INJECTION, SOLUTION EPIDURAL; INFILTRATION; INTRACAUDAL; PERINEURAL at 16:09

## 2024-07-17 NOTE — LETTER
July 18, 2024     SWETA Church  [address]  [address]  [facility] [address]    Patient: Megan Richmond   YOB: 1980   Date of Visit: 7/17/2024       Dear Dr. Spencer:    Thank you for referring Megan Richmond to me for evaluation. Below are my notes for this consultation.    If you have questions, please do not hesitate to call me. I look forward to following your patient along with you.         Sincerely,        Reinaldo Pop DPM        CC: No Recipients    Reinaldo Pop DPM  7/18/2024  8:55 AM  Sign when Signing Visit                 PATIENT:  Megan Richmond  1980         ASSESSMENT:     1. Saphenous nerve neuropathy, left  triamcinolone acetonide (KENALOG-40) 40 mg/mL injection 20 mg    lidocaine (PF) (XYLOCAINE-MPF) 1 % injection 2 mL    Nerve block      2. Pain of toe of left foot  Ambulatory Referral to Podiatry                PLAN:  1. Reviewed medical records.  Reviewed the note from PCP.  Patient was counseled and educated on the condition and the diagnosis.    2. The diagnosis, treatment options and prognosis were discussed with the patient.    3. Her condition is most likely related neuritis / compression of saphenous nerve.    4. Conservative cares have not helped her in the last 2 months and the patient wished to proceed with a nerve block.  See procedure.  5.  Instructed supportive care, home exercise, icing, and proper footwear/ arch support.   Discussed level of activity in the next few weeks.      6. Patient will return in 6 weeks for re-evaluation.       Imaging: I have personally reviewed pertinent films in PACS  Labs, pathology, and Other Studies: I have personally reviewed pertinent reports.      Nerve block    Date/Time: 7/17/2024 3:00 PM    Performed by: Reinaldo Pop DPM  Authorized by: Reinaldo Pop DPM    Patient location:  [facility]  [place] Protocol:  Consent: Verbal consent obtained.  Risks and benefits: risks, benefits and alternatives were discussed  Consent given by: patient  Time  "out: Immediately prior to procedure a \"time out\" was called to verify the correct patient, procedure, equipment, support staff and site/side marked as required.  Timeout called at: 7/17/2024 3:41 PM.  Patient understanding: patient states understanding of the procedure being performed  Site marked: the operative site was marked  Patient identity confirmed: verbally with patient    Indications:     Indications:  Pain relief and procedural anesthesia  Location:     Body area:  Lower extremity    Lower extremity nerve:  Saphenous    Laterality:  Left  Pre-procedure details:     Skin preparation:  Alcohol  Skin anesthesia (see MAR for exact dosages):     Skin anesthesia method:  Topical application    Topical anesthesia: Ethyl Chloride spray.  Procedure details (see MAR for exact dosages):     Block needle gauge:  25 G    Anesthetic injected:  Lidocaine 1% w/o epi    Steroid injected:  Triamcinolone    Injection procedure:  Anatomic landmarks identified and anatomic landmarks palpated  Post-procedure details:     Dressing:  Sterile dressing    Outcome:  Anesthesia achieved    Patient tolerance of procedure:  Tolerated well, no immediate complications  Comments:      Saphenous nerve block given at the level of left 1st TMTJ dorsomedially.  Injection also included left 1st TMTJ.        Subjective:       HPI  The patient was referred to my office for evaluation of left foot pain.  She has some numbness / pins / needles / burning sensation around left midfoot to great toe.  It is sensitive to touch.  It started about 2-3 months ago.  She was treated by her PCP.  She tried steroid pack without relieving her symptoms.  Increased pain with pressure and certain position. The patient does not recall any injury.  No significant weakness or dysfunction.         The following portions of the patient's history were reviewed and updated as appropriate: allergies, current medications, past family history, past medical history, past " social history, past surgical history and problem list.  All pertinent labs and images were reviewed.      Past Medical History  Past Medical History:   Diagnosis Date   • Abnormal ECG    • Abnormal Pap smear of cervix    • Allergic     Seasonal, skin in right nostril unhealing fissure, improved   • Anxiety 3/2020   • Arthritis 2021    Unable to open jars or use thimbs without sharp pain   • Depression    • IBS (irritable bowel syndrome)    • Ovarian cyst    • Varicella     As a child       Past Surgical History  Past Surgical History:   Procedure Laterality Date   •  SECTION      x 2   • CLAVICLE SURGERY Left    • SALPINGECTOMY Bilateral 2018        Allergies:  Bee venom and Sulfamethoxazole-trimethoprim    Medications:  Current Outpatient Medications   Medication Sig Dispense Refill   • cholestyramine (QUESTRAN) 4 g packet Take 1 packet (4 g total) by mouth 2 (two) times a day with meals 60 each 3   • diphenhydrAMINE (BENADRYL) 25 mg capsule Take 25 mg by mouth every 6 (six) hours as needed for itching     • hyoscyamine (LEVSIN/SL) 0.125 mg SL tablet Take 1 tablet (0.125 mg total) by mouth every 4 (four) hours as needed for cramping or diarrhea 30 tablet 0   • ibuprofen (MOTRIN) 200 mg tablet Take by mouth every 6 (six) hours as needed for mild pain     • ipratropium (ATROVENT) 0.03 % nasal spray 2 sprays into each nostril every 12 (twelve) hours 30 mL 0   • methylPREDNISolone 4 MG tablet therapy pack Use as directed on package 21 each 0   • EPINEPHrine (EPIPEN) 0.3 mg/0.3 mL SOAJ Inject 0.3 mL (0.3 mg total) into a muscle once for 1 dose 0.6 mL 0   • naproxen sodium (ANAPROX) 550 mg tablet Take 1 tablet (550 mg total) by mouth 3 (three) times a day with meals for 5 days 15 tablet 11     No current facility-administered medications for this visit.       Social History:  Social History     Socioeconomic History   • Marital status: /Civil Union     Spouse name: None   • Number of children: None  "  • Years of education: None   • Highest education level: None   Occupational History   • None   Tobacco Use   • Smoking status: Never   • Smokeless tobacco: Never   Vaping Use   • Vaping status: Never Used   Substance and Sexual Activity   • Alcohol use: Yes     Alcohol/week: 4.0 standard drinks of alcohol     Types: 4 Glasses of wine per week     Comment: 1 glass of wine per night   • Drug use: Not Currently   • Sexual activity: Yes     Partners: Male     Birth control/protection: Female Sterilization   Other Topics Concern   • None   Social History Narrative   • None     Social Determinants of Health     Financial Resource Strain: Not on file   Food Insecurity: Not on file   Transportation Needs: Not on file   Physical Activity: Not on file   Stress: Not on file   Social Connections: Not on file   Intimate Partner Violence: Not on file   Housing Stability: Not on file          Review of Systems   Constitutional:  Negative for chills and fever.   Respiratory:  Negative for cough and shortness of breath.    Cardiovascular:  Negative for chest pain.   Gastrointestinal:  Negative for nausea and vomiting.   Musculoskeletal:  Negative for gait problem and joint swelling.   Skin:  Negative for wound.   Allergic/Immunologic: Negative for immunocompromised state.   Neurological:  Negative for weakness.   Hematological: Negative.    Psychiatric/Behavioral:  Negative for behavioral problems and confusion.          Objective:      /73   Ht 5' 5\" (1.651 m)   Wt 66.2 kg (146 lb)   BMI 24.30 kg/m²          Physical Exam  Vitals reviewed.   Constitutional:       General: She is not in acute distress.     Appearance: She is not toxic-appearing or diaphoretic.   HENT:      Head: Normocephalic and atraumatic.   Eyes:      Extraocular Movements: Extraocular movements intact.   Cardiovascular:      Rate and Rhythm: Normal rate and regular rhythm.      Pulses: Normal pulses.           Dorsalis pedis pulses are 2+ on the right " side and 2+ on the left side.        Posterior tibial pulses are 2+ on the right side and 2+ on the left side.   Pulmonary:      Effort: Pulmonary effort is normal. No respiratory distress.   Musculoskeletal:         General: Tenderness present. No swelling or signs of injury. Normal range of motion.      Cervical back: Normal range of motion and neck supple.      Right lower leg: No edema.      Left lower leg: No edema.      Right foot: No foot drop.      Left foot: No foot drop.      Comments: Sensitivity / tenderness / Tinel sign along the course of saphenous nerve left foot, more intense at the level of 1st TMTJ dorsomedially.  No Tinel sign along left deep peroneal nerve.  No swelling or ecchymosis.  ROM WNL.  MMT WNL.  Protective sensation intact.    Skin:     General: Skin is warm.      Capillary Refill: Capillary refill takes less than 2 seconds.      Coloration: Skin is not cyanotic or mottled.      Findings: No abscess, erythema or rash.      Nails: There is no clubbing.   Neurological:      General: No focal deficit present.      Mental Status: She is alert and oriented to person, place, and time.      Cranial Nerves: No cranial nerve deficit.      Sensory: No sensory deficit.      Motor: No weakness.      Coordination: Coordination normal.      Gait: Gait normal.   Psychiatric:         Mood and Affect: Mood normal.         Behavior: Behavior normal.         Thought Content: Thought content normal.         Judgment: Judgment normal.

## 2024-07-17 NOTE — PROGRESS NOTES
"               PATIENT:  Megan Richmond  1980         ASSESSMENT:     1. Saphenous nerve neuropathy, left  triamcinolone acetonide (KENALOG-40) 40 mg/mL injection 20 mg    lidocaine (PF) (XYLOCAINE-MPF) 1 % injection 2 mL    Nerve block      2. Pain of toe of left foot  Ambulatory Referral to Podiatry                PLAN:  1. Reviewed medical records.  Reviewed the note from PCP.  Patient was counseled and educated on the condition and the diagnosis.    2. The diagnosis, treatment options and prognosis were discussed with the patient.    3. Her condition is most likely related neuritis / compression of saphenous nerve.    4. Conservative cares have not helped her in the last 2 months and the patient wished to proceed with a nerve block.  See procedure.  5.  Instructed supportive care, home exercise, icing, and proper footwear/ arch support.   Discussed level of activity in the next few weeks.      6. Patient will return in 6 weeks for re-evaluation.       Imaging: I have personally reviewed pertinent films in PACS  Labs, pathology, and Other Studies: I have personally reviewed pertinent reports.      Nerve block    Date/Time: 7/17/2024 3:00 PM    Performed by: Reinaldo Pop DPM  Authorized by: Reinaldo Pop DPM    Patient location:  [facility] Protocol:  Consent: Verbal consent obtained.  Risks and benefits: risks, benefits and alternatives were discussed  Consent given by: patient  Time out: Immediately prior to procedure a \"time out\" was called to verify the correct patient, procedure, equipment, support staff and site/side marked as required.  Timeout called at: 7/17/2024 3:41 PM.  Patient understanding: patient states understanding of the procedure being performed  Site marked: the operative site was marked  Patient identity confirmed: verbally with patient    Indications:     Indications:  Pain relief and procedural anesthesia  Location:     Body area:  Lower extremity    Lower extremity nerve:  Saphenous    " Laterality:  Left  Pre-procedure details:     Skin preparation:  Alcohol  Skin anesthesia (see MAR for exact dosages):     Skin anesthesia method:  Topical application    Topical anesthesia: Ethyl Chloride spray.  Procedure details (see MAR for exact dosages):     Block needle gauge:  25 G    Anesthetic injected:  Lidocaine 1% w/o epi    Steroid injected:  Triamcinolone    Injection procedure:  Anatomic landmarks identified and anatomic landmarks palpated  Post-procedure details:     Dressing:  Sterile dressing    Outcome:  Anesthesia achieved    Patient tolerance of procedure:  Tolerated well, no immediate complications  Comments:      Saphenous nerve block given at the level of left 1st TMTJ dorsomedially.  Injection also included left 1st TMTJ.        Subjective:       HPI  The patient was referred to my office for evaluation of left foot pain.  She has some numbness / pins / needles / burning sensation around left midfoot to great toe.  It is sensitive to touch.  It started about 2-3 months ago.  She was treated by her PCP.  She tried steroid pack without relieving her symptoms.  Increased pain with pressure and certain position. The patient does not recall any injury.  No significant weakness or dysfunction.         The following portions of the patient's history were reviewed and updated as appropriate: allergies, current medications, past family history, past medical history, past social history, past surgical history and problem list.  All pertinent labs and images were reviewed.      Past Medical History  Past Medical History:   Diagnosis Date    Abnormal ECG     Abnormal Pap smear of cervix     Allergic     Seasonal, skin in right nostril unhealing fissure, improved    Anxiety 3/2020    Arthritis 02/2021    Unable to open jars or use thimbs without sharp pain    Depression     IBS (irritable bowel syndrome)     Ovarian cyst     Varicella     As a child       Past Surgical History  Past Surgical History:    Procedure Laterality Date     SECTION      x 2    CLAVICLE SURGERY Left     SALPINGECTOMY Bilateral 2018        Allergies:  Bee venom and Sulfamethoxazole-trimethoprim    Medications:  Current Outpatient Medications   Medication Sig Dispense Refill    cholestyramine (QUESTRAN) 4 g packet Take 1 packet (4 g total) by mouth 2 (two) times a day with meals 60 each 3    diphenhydrAMINE (BENADRYL) 25 mg capsule Take 25 mg by mouth every 6 (six) hours as needed for itching      hyoscyamine (LEVSIN/SL) 0.125 mg SL tablet Take 1 tablet (0.125 mg total) by mouth every 4 (four) hours as needed for cramping or diarrhea 30 tablet 0    ibuprofen (MOTRIN) 200 mg tablet Take by mouth every 6 (six) hours as needed for mild pain      ipratropium (ATROVENT) 0.03 % nasal spray 2 sprays into each nostril every 12 (twelve) hours 30 mL 0    methylPREDNISolone 4 MG tablet therapy pack Use as directed on package 21 each 0    EPINEPHrine (EPIPEN) 0.3 mg/0.3 mL SOAJ Inject 0.3 mL (0.3 mg total) into a muscle once for 1 dose 0.6 mL 0    naproxen sodium (ANAPROX) 550 mg tablet Take 1 tablet (550 mg total) by mouth 3 (three) times a day with meals for 5 days 15 tablet 11     No current facility-administered medications for this visit.       Social History:  Social History     Socioeconomic History    Marital status: /Civil Union     Spouse name: None    Number of children: None    Years of education: None    Highest education level: None   Occupational History    None   Tobacco Use    Smoking status: Never    Smokeless tobacco: Never   Vaping Use    Vaping status: Never Used   Substance and Sexual Activity    Alcohol use: Yes     Alcohol/week: 4.0 standard drinks of alcohol     Types: 4 Glasses of wine per week     Comment: 1 glass of wine per night    Drug use: Not Currently    Sexual activity: Yes     Partners: Male     Birth control/protection: Female Sterilization   Other Topics Concern    None   Social History Narrative  "   None     Social Determinants of Health     Financial Resource Strain: Not on file   Food Insecurity: Not on file   Transportation Needs: Not on file   Physical Activity: Not on file   Stress: Not on file   Social Connections: Not on file   Intimate Partner Violence: Not on file   Housing Stability: Not on file          Review of Systems   Constitutional:  Negative for chills and fever.   Respiratory:  Negative for cough and shortness of breath.    Cardiovascular:  Negative for chest pain.   Gastrointestinal:  Negative for nausea and vomiting.   Musculoskeletal:  Negative for gait problem and joint swelling.   Skin:  Negative for wound.   Allergic/Immunologic: Negative for immunocompromised state.   Neurological:  Negative for weakness.   Hematological: Negative.    Psychiatric/Behavioral:  Negative for behavioral problems and confusion.          Objective:      /73   Ht 5' 5\" (1.651 m)   Wt 66.2 kg (146 lb)   BMI 24.30 kg/m²          Physical Exam  Vitals reviewed.   Constitutional:       General: She is not in acute distress.     Appearance: She is not toxic-appearing or diaphoretic.   HENT:      Head: Normocephalic and atraumatic.   Eyes:      Extraocular Movements: Extraocular movements intact.   Cardiovascular:      Rate and Rhythm: Normal rate and regular rhythm.      Pulses: Normal pulses.           Dorsalis pedis pulses are 2+ on the right side and 2+ on the left side.        Posterior tibial pulses are 2+ on the right side and 2+ on the left side.   Pulmonary:      Effort: Pulmonary effort is normal. No respiratory distress.   Musculoskeletal:         General: Tenderness present. No swelling or signs of injury. Normal range of motion.      Cervical back: Normal range of motion and neck supple.      Right lower leg: No edema.      Left lower leg: No edema.      Right foot: No foot drop.      Left foot: No foot drop.      Comments: Sensitivity / tenderness / Tinel sign along the course of saphenous " nerve left foot, more intense at the level of 1st TMTJ dorsomedially.  No Tinel sign along left deep peroneal nerve.  No swelling or ecchymosis.  ROM WNL.  MMT WNL.  Protective sensation intact.    Skin:     General: Skin is warm.      Capillary Refill: Capillary refill takes less than 2 seconds.      Coloration: Skin is not cyanotic or mottled.      Findings: No abscess, erythema or rash.      Nails: There is no clubbing.   Neurological:      General: No focal deficit present.      Mental Status: She is alert and oriented to person, place, and time.      Cranial Nerves: No cranial nerve deficit.      Sensory: No sensory deficit.      Motor: No weakness.      Coordination: Coordination normal.      Gait: Gait normal.   Psychiatric:         Mood and Affect: Mood normal.         Behavior: Behavior normal.         Thought Content: Thought content normal.         Judgment: Judgment normal.

## 2024-08-07 DIAGNOSIS — K58.0 IRRITABLE BOWEL SYNDROME WITH DIARRHEA: ICD-10-CM

## 2024-08-08 ENCOUNTER — PATIENT MESSAGE (OUTPATIENT)
Dept: GASTROENTEROLOGY | Facility: CLINIC | Age: 44
End: 2024-08-08

## 2024-08-08 DIAGNOSIS — R19.7 DIARRHEA, UNSPECIFIED TYPE: Primary | ICD-10-CM

## 2024-08-08 DIAGNOSIS — K58.0 IRRITABLE BOWEL SYNDROME WITH DIARRHEA: ICD-10-CM

## 2024-08-09 RX ORDER — MONTELUKAST SODIUM 4 MG/1
1 TABLET, CHEWABLE ORAL 2 TIMES DAILY
Qty: 90 TABLET | Refills: 1 | Status: SHIPPED | OUTPATIENT
Start: 2024-08-09

## 2024-08-17 ENCOUNTER — PATIENT MESSAGE (OUTPATIENT)
Dept: GASTROENTEROLOGY | Facility: CLINIC | Age: 44
End: 2024-08-17

## 2024-08-17 DIAGNOSIS — K58.0 IRRITABLE BOWEL SYNDROME WITH DIARRHEA: ICD-10-CM

## 2024-08-17 DIAGNOSIS — R19.7 DIARRHEA, UNSPECIFIED TYPE: ICD-10-CM

## 2024-08-20 ENCOUNTER — TELEPHONE (OUTPATIENT)
Age: 44
End: 2024-08-20

## 2024-08-20 RX ORDER — MONTELUKAST SODIUM 4 MG/1
1 TABLET, CHEWABLE ORAL 2 TIMES DAILY
Qty: 90 TABLET | Refills: 1 | Status: SHIPPED | OUTPATIENT
Start: 2024-08-20

## 2024-08-20 NOTE — TELEPHONE ENCOUNTER
I spoke with the pt requesting the send the medication Colestipol Colestid not cholestyramine to [facility] pharmacy on [address] Ph [phone].

## 2024-08-20 NOTE — TELEPHONE ENCOUNTER
Patients GI provider:  Dr. RAMOS    Number to return call: [phone]    Reason for call: Pt calling in requesting to send her prescription of  cholestyramine medication to the [facility] pharmacy on [address]  [phone] as she is not using the other pharmacy any more   Scheduled procedure/appointment date if applicable: Apt/procedure  N/A

## 2024-08-28 ENCOUNTER — OFFICE VISIT (OUTPATIENT)
Dept: PODIATRY | Facility: CLINIC | Age: 44
End: 2024-08-28
Payer: COMMERCIAL

## 2024-08-28 VITALS
DIASTOLIC BLOOD PRESSURE: 86 MMHG | SYSTOLIC BLOOD PRESSURE: 122 MMHG | HEIGHT: 65 IN | BODY MASS INDEX: 23.82 KG/M2 | WEIGHT: 143 LBS | HEART RATE: 81 BPM

## 2024-08-28 DIAGNOSIS — G57.82 SAPHENOUS NERVE NEUROPATHY, LEFT: Primary | ICD-10-CM

## 2024-08-28 DIAGNOSIS — G57.92 NEURITIS OF LEFT FOOT: ICD-10-CM

## 2024-08-28 PROCEDURE — 99213 OFFICE O/P EST LOW 20 MIN: CPT | Performed by: PODIATRIST

## 2024-08-28 PROCEDURE — 20550 NJX 1 TENDON SHEATH/LIGAMENT: CPT | Performed by: PODIATRIST

## 2024-08-28 RX ORDER — TRIAMCINOLONE ACETONIDE 40 MG/ML
20 INJECTION, SUSPENSION INTRA-ARTICULAR; INTRAMUSCULAR
Status: SHIPPED | OUTPATIENT
Start: 2024-08-28

## 2024-08-28 RX ORDER — LIDOCAINE HYDROCHLORIDE 10 MG/ML
1 INJECTION, SOLUTION INFILTRATION; PERINEURAL
Status: SHIPPED | OUTPATIENT
Start: 2024-08-28

## 2024-08-28 RX ADMIN — TRIAMCINOLONE ACETONIDE 20 MG: 40 INJECTION, SUSPENSION INTRA-ARTICULAR; INTRAMUSCULAR at 13:30

## 2024-08-28 RX ADMIN — LIDOCAINE HYDROCHLORIDE 1 ML: 10 INJECTION, SOLUTION INFILTRATION; PERINEURAL at 13:30

## 2024-08-28 NOTE — PROGRESS NOTES
"               PATIENT:  Megan Richmond  1980         ASSESSMENT:     1. Saphenous nerve neuropathy, left        2. Neuritis of left foot  Trigger Injection 1 or 2 muscles                PLAN:  1. Reviewed medical records.  Patient was counseled and educated on the condition and the diagnosis.    2. The diagnosis, treatment options and prognosis were discussed with the patient.    3.  She has trigger points more distally around 1st met head.  Will try another injection.  See procedure.   4.  Instructed supportive care, home exercise, icing, and proper footwear/ arch support.    5. Patient will return in 6 weeks for re-evaluation.       Imaging: I have personally reviewed pertinent films in PACS  Labs, pathology, and Other Studies: I have personally reviewed pertinent reports.       Universal Protocol:  Consent: Verbal consent obtained.  Risks and benefits: risks, benefits and alternatives were discussed  Consent given by: patient  Time out: Immediately prior to procedure a \"time out\" was called to verify the correct patient, procedure, equipment, support staff and site/side marked as required.  Timeout called at: 8/28/2024 1:52 PM.  Patient understanding: patient states understanding of the procedure being performed  Site marked: the operative site was marked  Patient identity confirmed: verbally with patient  Supporting Documentation  Indications: pain   Trigger Point Injections: single/multiple trigger point(s): 1-2 muscle groups    Injection site identified by: palpation  Procedure Details  Location(s):    Prep: patient was prepped and draped in usual sterile fashion  Needle size: 25 G  Medications: 20 mg triamcinolone acetonide 40 mg/mL; 1 mL lidocaine 1 %  Patient tolerance: patient tolerated the procedure well with no immediate complications  Additional procedure details: Trigger point injection given at the level of dorsomedial and medial left 1st metatarsal neck          Subjective:       HPI  The patient " presents for follow-up on left foot pain.  Injection did not seem to help her.  She has some numbness / pins / needles / burning sensation around left great toe.  No significant weakness or dysfunction.         The following portions of the patient's history were reviewed and updated as appropriate: allergies, current medications, past family history, past medical history, past social history, past surgical history and problem list.  All pertinent labs and images were reviewed.      Past Medical History  Past Medical History:   Diagnosis Date    Abnormal ECG     Abnormal Pap smear of cervix     Allergic     Seasonal, skin in right nostril unhealing fissure, improved    Anxiety 3/2020    Arthritis 2021    Unable to open jars or use thimbs without sharp pain    Depression     IBS (irritable bowel syndrome)     Ovarian cyst     Plantar fasciitis     Varicella     As a child       Past Surgical History  Past Surgical History:   Procedure Laterality Date     SECTION      x 2    CLAVICLE SURGERY Left     SALPINGECTOMY Bilateral 2018        Allergies:  Bee venom and Sulfamethoxazole-trimethoprim    Medications:  Current Outpatient Medications   Medication Sig Dispense Refill    colestipol (COLESTID) 1 g tablet Take 1 tablet (1 g total) by mouth 2 (two) times a day 90 tablet 1    diphenhydrAMINE (BENADRYL) 25 mg capsule Take 25 mg by mouth every 6 (six) hours as needed for itching      EPINEPHrine (EPIPEN) 0.3 mg/0.3 mL SOAJ Inject 0.3 mL (0.3 mg total) into a muscle once for 1 dose (Patient taking differently: Inject 0.3 mg into a muscle once) 0.6 mL 0    hyoscyamine (LEVSIN/SL) 0.125 mg SL tablet Take 1 tablet (0.125 mg total) by mouth every 4 (four) hours as needed for cramping or diarrhea (Patient taking differently: Take 0.125 mg by mouth every 4 (four) hours as needed for cramping or diarrhea) 30 tablet 0    ibuprofen (MOTRIN) 200 mg tablet Take by mouth every 6 (six) hours as needed for mild pain       ipratropium (ATROVENT) 0.03 % nasal spray 2 sprays into each nostril every 12 (twelve) hours (Patient taking differently: 2 sprays into each nostril every 12 (twelve) hours) 30 mL 0    methylPREDNISolone 4 MG tablet therapy pack Use as directed on package 21 each 0    naproxen sodium (ANAPROX) 550 mg tablet Take 1 tablet (550 mg total) by mouth 3 (three) times a day with meals for 5 days 15 tablet 11     No current facility-administered medications for this visit.       Social History:  Social History     Socioeconomic History    Marital status: /Civil Union     Spouse name: None    Number of children: None    Years of education: None    Highest education level: None   Occupational History    None   Tobacco Use    Smoking status: Never    Smokeless tobacco: Never   Vaping Use    Vaping status: Never Used   Substance and Sexual Activity    Alcohol use: Yes     Alcohol/week: 4.0 standard drinks of alcohol     Types: 4 Glasses of wine per week     Comment: 1 glass of wine per night    Drug use: Not Currently    Sexual activity: Yes     Partners: Male     Birth control/protection: Female Sterilization   Other Topics Concern    None   Social History Narrative    None     Social Determinants of Health     Financial Resource Strain: Not on file   Food Insecurity: Not on file   Transportation Needs: Not on file   Physical Activity: Not on file   Stress: Not on file   Social Connections: Not on file   Intimate Partner Violence: Not on file   Housing Stability: Not on file          Review of Systems   Constitutional:  Negative for chills and fever.   Respiratory:  Negative for cough and shortness of breath.    Cardiovascular:  Negative for chest pain.   Gastrointestinal:  Negative for nausea and vomiting.   Musculoskeletal:  Negative for gait problem.   Neurological:  Negative for weakness.   Psychiatric/Behavioral:  Negative for confusion.          Objective:      /86 (BP Location: Left arm, Patient Position:  "Sitting, Cuff Size: Adult)   Pulse 81   Ht 5' 5\" (1.651 m) Comment: stated  Wt 64.9 kg (143 lb)   BMI 23.80 kg/m²          Physical Exam  Vitals reviewed.   Constitutional:       General: She is not in acute distress.     Appearance: She is not toxic-appearing or diaphoretic.   Cardiovascular:      Rate and Rhythm: Normal rate and regular rhythm.      Pulses: Normal pulses.           Dorsalis pedis pulses are 2+ on the right side and 2+ on the left side.        Posterior tibial pulses are 2+ on the right side and 2+ on the left side.   Pulmonary:      Effort: Pulmonary effort is normal. No respiratory distress.   Musculoskeletal:         General: Tenderness present. No swelling or signs of injury. Normal range of motion.      Right lower leg: No edema.      Left lower leg: No edema.      Right foot: No foot drop.      Left foot: No foot drop.      Comments: Minimal pain along the course of left saphenous nerve around left ankle and midfoot.  Trigger point/ sensitivity noted at dorsomedial left 1st metatarsal neck area.  No Tinel sign along left deep peroneal nerve.  No swelling or ecchymosis.  ROM WNL.  MMT WNL.   Skin:     General: Skin is warm.      Capillary Refill: Capillary refill takes less than 2 seconds.      Coloration: Skin is not cyanotic or mottled.      Findings: No abscess, erythema or rash.      Nails: There is no clubbing.   Neurological:      General: No focal deficit present.      Mental Status: She is alert and oriented to person, place, and time.      Cranial Nerves: No cranial nerve deficit.      Sensory: No sensory deficit.      Motor: No weakness.      Coordination: Coordination normal.      Gait: Gait normal.   Psychiatric:         Mood and Affect: Mood normal.         Behavior: Behavior normal.         Thought Content: Thought content normal.         Judgment: Judgment normal.         "

## 2024-09-03 ENCOUNTER — TELEPHONE (OUTPATIENT)
Age: 44
End: 2024-09-03

## 2024-09-03 NOTE — TELEPHONE ENCOUNTER
Patient left message asking for a screening mammogram order.  Her last mammogram was on 9/14/23.  She has an open order for an ABUS but she did not schedule one.  She had a Fast Breast MRI on 12/6/23 but she does not want to repeat it.  Patient's Annual exam is scheduled for 11/6/24. Please place a mammogram order if you approve.

## 2024-09-03 NOTE — TELEPHONE ENCOUNTER
Please place script for mammogram.        Patient goes to [facility] for mammogram.    Ultrasound script is in chart from 10/2023.      Patient does not want to do MRI.Has the option to do mri or abus based on provider note.    Please send [employer] message to make patient Aware script has been placed.

## 2024-09-04 DIAGNOSIS — T78.40XA ALLERGIC REACTION, INITIAL ENCOUNTER: ICD-10-CM

## 2024-09-04 DIAGNOSIS — Z12.31 BREAST CANCER SCREENING BY MAMMOGRAM: Primary | ICD-10-CM

## 2024-09-04 DIAGNOSIS — J30.2 SEASONAL ALLERGIES: ICD-10-CM

## 2024-09-04 NOTE — TELEPHONE ENCOUNTER
Left detailed message advising mammogram was ordered, pt to call central scheduling to set up appointment. Phone number provided. Pt to call back with any questions. Okay per communication consent form.

## 2024-09-05 RX ORDER — IPRATROPIUM BROMIDE 21 UG/1
2 SPRAY, METERED NASAL EVERY 12 HOURS
Qty: 30 ML | Refills: 0 | Status: SHIPPED | OUTPATIENT
Start: 2024-09-05

## 2024-09-05 RX ORDER — EPINEPHRINE 0.3 MG/.3ML
0.3 INJECTION SUBCUTANEOUS ONCE
Qty: 0.6 ML | Refills: 0 | Status: SHIPPED | OUTPATIENT
Start: 2024-09-05 | End: 2024-09-05

## 2024-10-08 ENCOUNTER — HOSPITAL ENCOUNTER (OUTPATIENT)
Dept: MAMMOGRAPHY | Facility: CLINIC | Age: 44
Discharge: HOME | End: 2024-10-08
Payer: COMMERCIAL

## 2024-10-08 VITALS — HEIGHT: 65 IN | BODY MASS INDEX: 22.66 KG/M2 | WEIGHT: 136 LBS

## 2024-10-08 DIAGNOSIS — Z12.31 BREAST CANCER SCREENING BY MAMMOGRAM: ICD-10-CM

## 2024-10-08 PROCEDURE — 77067 SCR MAMMO BI INCL CAD: CPT

## 2024-10-08 PROCEDURE — 77063 BREAST TOMOSYNTHESIS BI: CPT

## 2024-10-16 DIAGNOSIS — J30.2 SEASONAL ALLERGIES: ICD-10-CM

## 2024-10-16 RX ORDER — IPRATROPIUM BROMIDE 21 UG/1
2 SPRAY, METERED NASAL EVERY 12 HOURS
Qty: 30 ML | Refills: 0 | Status: SHIPPED | OUTPATIENT
Start: 2024-10-16

## 2024-10-24 ENCOUNTER — NURSE TRIAGE (OUTPATIENT)
Age: 44
End: 2024-10-24

## 2024-10-24 NOTE — TELEPHONE ENCOUNTER
Please call patient.  Need evaluation to determine appropriate treatment.  I have a few open appointments today.  If she cannot make those, I would advise urgent care.

## 2024-10-24 NOTE — TELEPHONE ENCOUNTER
Spoke with patient. Offered her an appt with you today. Patient could not make any of your open appt slots. Patient stated she needed after 4pm. We have no openings after 4 today or tomorrow so advised UC. Patient said she would have her children and there might be a wait and that won't be fun with the kids.   Patient said she would work it out.

## 2024-10-24 NOTE — TELEPHONE ENCOUNTER
"Patient reports UTI symptoms that started 3 days ago and are becoming worse. She would like a call back to advise if her PCP can call antibiotics in to her pharmacy ([facility]). Patient is unable to come in for an office visit today.     Reason for Disposition   Urinating more frequently than usual (i.e., frequency) OR new-onset of the feeling of an urgent need to urinate (i.e., urgency)    Answer Assessment - Initial Assessment Questions  1. SYMPTOM: \"What's the main symptom you're concerned about?\" (e.g., frequency, incontinence)      Frequency, bladder spasms   2. ONSET: \"When did they start?\"      3 days ago   3. PAIN: \"Is there any pain?\" If Yes, ask: \"How bad is it?\" (Scale: 1-10; mild, moderate, severe)      Mild-moderate  4. CAUSE: \"What do you think is causing the symptoms?\"      UTI  5. OTHER SYMPTOMS: \"Do you have any other symptoms?\" (e.g., blood in urine, fever, flank pain, pain with urination)      Denies  6. PREGNANCY: \"Is there any chance you are pregnant?\" \"When was your last menstrual period?\"      Denies    Protocols used: Urinary Symptoms-Adult-[place]    "

## 2024-10-28 NOTE — TELEPHONE ENCOUNTER
Pls call pt. She needs to be evaluated as advised previously. I am not comfortable ordering testing of treatment with her being seen. If nothing here, I again advise urgent care

## 2024-10-28 NOTE — TELEPHONE ENCOUNTER
Patient called in and states that her  prescribed her Macrobid for a UTI and states that she is about to complete it but still feel like she can't completely empty her bladder and slight urine frequency and is requesting an order for a UA culture to be placed for her to go to the lab. Please advise

## 2024-11-11 ENCOUNTER — APPOINTMENT (OUTPATIENT)
Dept: LAB | Facility: CLINIC | Age: 44
End: 2024-11-11
Payer: COMMERCIAL

## 2024-11-11 ENCOUNTER — OFFICE VISIT (OUTPATIENT)
Dept: FAMILY MEDICINE CLINIC | Facility: CLINIC | Age: 44
End: 2024-11-11
Payer: COMMERCIAL

## 2024-11-11 ENCOUNTER — APPOINTMENT (OUTPATIENT)
Dept: RADIOLOGY | Facility: CLINIC | Age: 44
End: 2024-11-11
Payer: COMMERCIAL

## 2024-11-11 VITALS
HEIGHT: 65 IN | HEART RATE: 74 BPM | WEIGHT: 142 LBS | DIASTOLIC BLOOD PRESSURE: 72 MMHG | SYSTOLIC BLOOD PRESSURE: 110 MMHG | OXYGEN SATURATION: 99 % | BODY MASS INDEX: 23.66 KG/M2 | TEMPERATURE: 97.7 F

## 2024-11-11 DIAGNOSIS — Z13.220 SCREENING, LIPID: ICD-10-CM

## 2024-11-11 DIAGNOSIS — M79.644 CHRONIC THUMB PAIN, BILATERAL: ICD-10-CM

## 2024-11-11 DIAGNOSIS — Z13.0 SCREENING FOR DEFICIENCY ANEMIA: ICD-10-CM

## 2024-11-11 DIAGNOSIS — G89.29 CHRONIC THUMB PAIN, BILATERAL: ICD-10-CM

## 2024-11-11 DIAGNOSIS — Z00.00 ANNUAL PHYSICAL EXAM: Primary | ICD-10-CM

## 2024-11-11 DIAGNOSIS — M89.8X1 PAIN OF LEFT CLAVICLE: ICD-10-CM

## 2024-11-11 DIAGNOSIS — M79.645 CHRONIC THUMB PAIN, BILATERAL: ICD-10-CM

## 2024-11-11 DIAGNOSIS — M77.11 LATERAL EPICONDYLITIS OF RIGHT ELBOW: ICD-10-CM

## 2024-11-11 DIAGNOSIS — N32.89 BLADDER SPASM: ICD-10-CM

## 2024-11-11 DIAGNOSIS — Z00.6 ENCOUNTER FOR EXAMINATION FOR NORMAL COMPARISON OR CONTROL IN CLINICAL RESEARCH PROGRAM: ICD-10-CM

## 2024-11-11 DIAGNOSIS — R31.9 HEMATURIA, UNSPECIFIED TYPE: ICD-10-CM

## 2024-11-11 DIAGNOSIS — Z13.1 SCREENING FOR DIABETES MELLITUS: ICD-10-CM

## 2024-11-11 DIAGNOSIS — Z13.29 SCREENING FOR THYROID DISORDER: ICD-10-CM

## 2024-11-11 DIAGNOSIS — R39.14 FEELING OF INCOMPLETE BLADDER EMPTYING: ICD-10-CM

## 2024-11-11 LAB
ALBUMIN SERPL BCG-MCNC: 4.1 G/DL (ref 3.5–5)
ALP SERPL-CCNC: 39 U/L (ref 34–104)
ALT SERPL W P-5'-P-CCNC: 17 U/L (ref 7–52)
ANION GAP SERPL CALCULATED.3IONS-SCNC: 9 MMOL/L (ref 4–13)
AST SERPL W P-5'-P-CCNC: 23 U/L (ref 13–39)
BASOPHILS # BLD AUTO: 0.03 THOUSANDS/ÂΜL (ref 0–0.1)
BASOPHILS NFR BLD AUTO: 1 % (ref 0–1)
BILIRUB SERPL-MCNC: 0.4 MG/DL (ref 0.2–1)
BUN SERPL-MCNC: 12 MG/DL (ref 5–25)
CALCIUM SERPL-MCNC: 8.7 MG/DL (ref 8.4–10.2)
CHLORIDE SERPL-SCNC: 102 MMOL/L (ref 96–108)
CHOLEST SERPL-MCNC: 191 MG/DL
CO2 SERPL-SCNC: 27 MMOL/L (ref 21–32)
CREAT SERPL-MCNC: 0.75 MG/DL (ref 0.6–1.3)
EOSINOPHIL # BLD AUTO: 0.05 THOUSAND/ÂΜL (ref 0–0.61)
EOSINOPHIL NFR BLD AUTO: 1 % (ref 0–6)
ERYTHROCYTE [DISTWIDTH] IN BLOOD BY AUTOMATED COUNT: 13.8 % (ref 11.6–15.1)
GFR SERPL CREATININE-BSD FRML MDRD: 98 ML/MIN/1.73SQ M
GLUCOSE P FAST SERPL-MCNC: 87 MG/DL (ref 65–99)
HCT VFR BLD AUTO: 38.1 % (ref 34.8–46.1)
HDLC SERPL-MCNC: 78 MG/DL
HGB BLD-MCNC: 12.3 G/DL (ref 11.5–15.4)
IMM GRANULOCYTES # BLD AUTO: 0.01 THOUSAND/UL (ref 0–0.2)
IMM GRANULOCYTES NFR BLD AUTO: 0 % (ref 0–2)
LDLC SERPL CALC-MCNC: 101 MG/DL (ref 0–100)
LYMPHOCYTES # BLD AUTO: 1.75 THOUSANDS/ÂΜL (ref 0.6–4.47)
LYMPHOCYTES NFR BLD AUTO: 29 % (ref 14–44)
MCH RBC QN AUTO: 30.9 PG (ref 26.8–34.3)
MCHC RBC AUTO-ENTMCNC: 32.3 G/DL (ref 31.4–37.4)
MCV RBC AUTO: 96 FL (ref 82–98)
MONOCYTES # BLD AUTO: 0.32 THOUSAND/ÂΜL (ref 0.17–1.22)
MONOCYTES NFR BLD AUTO: 5 % (ref 4–12)
NEUTROPHILS # BLD AUTO: 3.81 THOUSANDS/ÂΜL (ref 1.85–7.62)
NEUTS SEG NFR BLD AUTO: 64 % (ref 43–75)
NONHDLC SERPL-MCNC: 113 MG/DL
NRBC BLD AUTO-RTO: 0 /100 WBCS
PLATELET # BLD AUTO: 272 THOUSANDS/UL (ref 149–390)
PMV BLD AUTO: 10.7 FL (ref 8.9–12.7)
POTASSIUM SERPL-SCNC: 4.4 MMOL/L (ref 3.5–5.3)
PROT SERPL-MCNC: 6.8 G/DL (ref 6.4–8.4)
RBC # BLD AUTO: 3.98 MILLION/UL (ref 3.81–5.12)
SL AMB  POCT GLUCOSE, UA: NEGATIVE
SL AMB LEUKOCYTE ESTERASE,UA: NEGATIVE
SL AMB POCT BILIRUBIN,UA: NEGATIVE
SL AMB POCT BLOOD,UA: NORMAL
SL AMB POCT CLARITY,UA: CLEAR
SL AMB POCT COLOR,UA: YELLOW
SL AMB POCT KETONES,UA: NEGATIVE
SL AMB POCT NITRITE,UA: NEGATIVE
SL AMB POCT PH,UA: 6
SL AMB POCT SPECIFIC GRAVITY,UA: 1.01
SL AMB POCT URINE PROTEIN: NEGATIVE
SL AMB POCT UROBILINOGEN: NORMAL
SODIUM SERPL-SCNC: 138 MMOL/L (ref 135–147)
TRIGL SERPL-MCNC: 58 MG/DL
TSH SERPL DL<=0.05 MIU/L-ACNC: 2.3 UIU/ML (ref 0.45–4.5)
WBC # BLD AUTO: 5.97 THOUSAND/UL (ref 4.31–10.16)

## 2024-11-11 PROCEDURE — 81002 URINALYSIS NONAUTO W/O SCOPE: CPT | Performed by: NURSE PRACTITIONER

## 2024-11-11 PROCEDURE — 99214 OFFICE O/P EST MOD 30 MIN: CPT | Performed by: NURSE PRACTITIONER

## 2024-11-11 PROCEDURE — 84443 ASSAY THYROID STIM HORMONE: CPT

## 2024-11-11 PROCEDURE — 80061 LIPID PANEL: CPT

## 2024-11-11 PROCEDURE — 85025 COMPLETE CBC W/AUTO DIFF WBC: CPT

## 2024-11-11 PROCEDURE — 36415 COLL VENOUS BLD VENIPUNCTURE: CPT

## 2024-11-11 PROCEDURE — 80053 COMPREHEN METABOLIC PANEL: CPT

## 2024-11-11 PROCEDURE — 87086 URINE CULTURE/COLONY COUNT: CPT | Performed by: NURSE PRACTITIONER

## 2024-11-11 PROCEDURE — 99396 PREV VISIT EST AGE 40-64: CPT | Performed by: NURSE PRACTITIONER

## 2024-11-11 PROCEDURE — 71130 X-RAY STRENOCLAVIC JT 3/>VWS: CPT

## 2024-11-11 PROCEDURE — 73140 X-RAY EXAM OF FINGER(S): CPT
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
## 2024-11-12 LAB — BACTERIA UR CULT: NORMAL

## 2024-11-19 LAB
APOB+LDLR+PCSK9 GENE MUT ANL BLD/T: NOT DETECTED
BRCA1+BRCA2 DEL+DUP + FULL MUT ANL BLD/T: NOT DETECTED
MLH1+MSH2+MSH6+PMS2 GN DEL+DUP+FUL M: NOT DETECTED

## 2024-11-25 ENCOUNTER — HOSPITAL ENCOUNTER (OUTPATIENT)
Dept: ULTRASOUND IMAGING | Facility: MEDICAL CENTER | Age: 44
Discharge: HOME | End: 2024-11-25
Payer: COMMERCIAL

## 2024-11-25 DIAGNOSIS — R39.14 FEELING OF INCOMPLETE BLADDER EMPTYING: ICD-10-CM

## 2024-11-25 DIAGNOSIS — N32.89 BLADDER SPASM: ICD-10-CM

## 2024-11-25 PROCEDURE — 76770 US EXAM ABDO BACK WALL COMP: CPT

## 2024-11-27 ENCOUNTER — OFFICE VISIT (OUTPATIENT)
Dept: PODIATRY | Facility: CLINIC | Age: 44
End: 2024-11-27
Payer: COMMERCIAL

## 2024-11-27 VITALS — WEIGHT: 140 LBS | HEIGHT: 65 IN | RESPIRATION RATE: 18 BRPM | BODY MASS INDEX: 23.32 KG/M2

## 2024-11-27 DIAGNOSIS — G57.92 NEURITIS OF LEFT FOOT: Primary | ICD-10-CM

## 2024-11-27 PROCEDURE — 99214 OFFICE O/P EST MOD 30 MIN: CPT | Performed by: PODIATRIST

## 2024-11-27 RX ORDER — GABAPENTIN 300 MG/1
300 CAPSULE ORAL
Qty: 30 CAPSULE | Refills: 0 | Status: SHIPPED | OUTPATIENT
Start: 2024-11-27 | End: 2024-12-27

## 2024-11-27 NOTE — PROGRESS NOTES
Name: Megan Richmond      : 1980      MRN: [id number]  Encounter Provider: Joon Teran DPM  Encounter Date: 2024   Encounter department: [facility]    Explained to patient that the discoloration and dry skin along the first ray is secondary to the prior cortisone injections.  Additional injections are not advised.    Explained that her symptoms remain that of neuritis.  Patient placed on gabapentin 300 mg at bedtime.  Reappoint 4 weeks.  :  Assessment & Plan  Neuritis of left foot    Orders:    gabapentin (Neurontin) 300 mg capsule; Take 1 capsule (300 mg total) by mouth daily at bedtime        History of Present Illness     HPI  Megan Richmond is a 44 y.o. female who presents with concern regarding her left foot.  Approximately 4 months ago, patient developed burning tingling and numbness in the left foot.  The paresthesia was on the dorsum of the foot and radiated towards the great toe.  She was seen by Dr. Pop.  He diagnosed saphenous neuritis and injected the area with Kenalog 40.  Unfortunately, initial injection was not helpful at all and 1 month after the initial injections he injected Kenalog 40 at the first MPJ.      Today, the patient notes that she still has discomfort but it is modestly improved.  However her skin looks damaged at the area of the injection.  It appears bruised and the skin has become very dry.      On questioning, patient denies diabetes, back issues, alcohol abuse or history of chemotherapy.  The patient used to be engaged in yoga and she feels that this might have led to her problem.  However, no right foot disorder present.  The patient tried ibuprofen along with prednisone to no avail.    She still has significant discomfort if the first ray is stripped with discomfort initiated at the first metatarsal cuneiform joint.          Review of Systems   Gastrointestinal:         History of irritable bowel syndrome   Musculoskeletal:  Positive for arthralgias.    Psychiatric/Behavioral: Negative.           Past Medical History   Past Medical History:   Diagnosis Date    Abnormal ECG     Abnormal Pap smear of cervix     Allergic     Seasonal, skin in right nostril unhealing fissure, improved    Anxiety 3/2020    Arthritis 2021    Unable to open jars or use thimbs without sharp pain    Depression     IBS (irritable bowel syndrome)     Ovarian cyst     Plantar fasciitis     Varicella     As a child     Past Surgical History:   Procedure Laterality Date     SECTION      x 2    CLAVICLE SURGERY Left     SALPINGECTOMY Bilateral 2018     Family History   Problem Relation Age of Onset    Breast cancer Mother 40        Brca negative    Rheum arthritis Mother     Throat cancer Mother     Cancer Mother         Breast and oropharyngeal CA    Arthritis Mother         Rheumatoid arthritis    Hyperlipidemia Mother         Controlled with a statin    Hypertension Father     Heart disease Father     Sleep apnea Father     Depression Father         Undiagnosed specific    Anxiety disorder Father     Deep vein thrombosis Father     Rashes / Skin problems Father         Multiple precancerous spots removed    Arthritis Father         Osteo, severe    Clotting disorder Father         Xarelto for DVTs    Cancer Sister 40        Multiple myeloma diagnosed fe this year    No Known Problems Daughter     Breast cancer Maternal Grandmother 80    No Known Problems Maternal Grandfather     No Known Problems Paternal Grandmother     No Known Problems Paternal Grandfather     No Known Problems Maternal Aunt     No Known Problems Maternal Aunt     No Known Problems Paternal Aunt     No Known Problems Paternal Aunt       reports that she has never smoked. She has never used smokeless tobacco. She reports current alcohol use of about 4.0 standard drinks of alcohol per week. She reports that she does not currently use drugs.  Current Outpatient Medications on File Prior to Visit   Medication  "Sig Dispense Refill    colestipol (COLESTID) 1 g tablet Take 1 tablet (1 g total) by mouth 2 (two) times a day 90 tablet 1    diphenhydrAMINE (BENADRYL) 25 mg capsule Take 25 mg by mouth every 6 (six) hours as needed for itching      EPINEPHrine (EPIPEN) 0.3 mg/0.3 mL SOAJ Inject 0.3 mL (0.3 mg total) into a muscle once for 1 dose 0.6 mL 0    hyoscyamine (LEVSIN/SL) 0.125 mg SL tablet Take 1 tablet (0.125 mg total) by mouth every 4 (four) hours as needed for cramping or diarrhea (Patient taking differently: Take 0.125 mg by mouth every 4 (four) hours as needed for cramping or diarrhea) 30 tablet 0    ibuprofen (MOTRIN) 200 mg tablet Take by mouth every 6 (six) hours as needed for mild pain      ipratropium (ATROVENT) 0.03 % nasal spray SPRAY 2 SPRAYS INTO EACH NOSTRIL EVERY 12 HOURS. 30 mL 0    methylPREDNISolone 4 MG tablet therapy pack Use as directed on package 21 each 0    naproxen sodium (ANAPROX) 550 mg tablet Take 1 tablet (550 mg total) by mouth 3 (three) times a day with meals for 5 days 15 tablet 11     Current Facility-Administered Medications on File Prior to Visit   Medication Dose Route Frequency Provider Last Rate Last Admin    lidocaine (XYLOCAINE) 1 % injection 1 mL  1 mL Injection     1 mL at 08/28/24 1330    triamcinolone acetonide (KENALOG-40) 40 mg/mL injection 20 mg  20 mg Intra-articular     20 mg at 08/28/24 1330     Allergies   Allergen Reactions    Bee Venom Swelling    Sulfamethoxazole-Trimethoprim Rash     rash           Objective   Resp 18   Ht 5' 5\" (1.651 m)   Wt 63.5 kg (140 lb)   LMP 11/08/2024 (Approximate)   BMI 23.30 kg/m²      Physical Exam  Constitutional:       Appearance: Normal appearance.   Cardiovascular:      Pulses: Normal pulses.   Musculoskeletal:         General: Normal range of motion.   Skin:     Comments: Bruising noted at first metatarsal cuneiform joint left foot.  Dry atrophic skin noted along left first ray.   Neurological:      General: No focal deficit " present.      Mental Status: She is oriented to person, place, and time.      Comments: Paresthesia with light stroking along left first metatarsal.

## 2024-12-06 ENCOUNTER — ANNUAL EXAM (OUTPATIENT)
Dept: OBGYN CLINIC | Facility: CLINIC | Age: 44
End: 2024-12-06
Payer: COMMERCIAL

## 2024-12-06 VITALS
SYSTOLIC BLOOD PRESSURE: 115 MMHG | DIASTOLIC BLOOD PRESSURE: 70 MMHG | WEIGHT: 145.8 LBS | HEIGHT: 65 IN | BODY MASS INDEX: 24.29 KG/M2

## 2024-12-06 DIAGNOSIS — Z01.419 ENCOUNTER FOR WELL WOMAN EXAM WITH ROUTINE GYNECOLOGICAL EXAM: Primary | ICD-10-CM

## 2024-12-06 DIAGNOSIS — Z12.31 BREAST CANCER SCREENING BY MAMMOGRAM: ICD-10-CM

## 2024-12-06 DIAGNOSIS — N93.9 ABNORMAL UTERINE BLEEDING (AUB): ICD-10-CM

## 2024-12-06 DIAGNOSIS — N89.8 VAGINAL ITCHING: ICD-10-CM

## 2024-12-06 DIAGNOSIS — R92.343 EXTREMELY DENSE TISSUE OF BOTH BREASTS ON MAMMOGRAPHY: ICD-10-CM

## 2024-12-06 DIAGNOSIS — Z12.39 BREAST CANCER SCREENING, HIGH RISK PATIENT: ICD-10-CM

## 2024-12-06 PROCEDURE — S0612 ANNUAL GYNECOLOGICAL EXAMINA: HCPCS | Performed by: OBSTETRICS & GYNECOLOGY

## 2024-12-06 PROCEDURE — G0145 SCR C/V CYTO,THINLAYER,RESCR: HCPCS | Performed by: OBSTETRICS & GYNECOLOGY

## 2024-12-06 PROCEDURE — G0476 HPV COMBO ASSAY CA SCREEN: HCPCS | Performed by: OBSTETRICS & GYNECOLOGY

## 2024-12-06 PROCEDURE — 81514 NFCT DS BV&VAGINITIS DNA ALG: CPT | Performed by: OBSTETRICS & GYNECOLOGY

## 2024-12-06 RX ORDER — TRANEXAMIC ACID 650 MG/1
1300 TABLET ORAL 3 TIMES DAILY
Qty: 30 TABLET | Refills: 11 | Status: SHIPPED | OUTPATIENT
Start: 2024-12-06 | End: 2024-12-11

## 2024-12-06 RX ORDER — TRETINOIN 0.25 MG/G
CREAM TOPICAL
COMMUNITY
Start: 2024-11-29

## 2024-12-06 NOTE — PROGRESS NOTES
"[facility]  [address], [address], [place]    ASSESSMENT/PLAN: Megan Richmond is a 44 y.o.  who presents for annual gynecologic exam.    Encounter for routine gynecologic examination  - Routine well woman exam completed today.  - Cervical Cancer Screening: Current ASCCP Guidelines reviewed. Last Pap: 2021. Pap every 3 years, due today  - Contraceptive counseling discussed.  Current contraception: BS done with prior CS     Additional problems addressed during this visit:  1. Encounter for well woman exam with routine gynecological exam  -     Liquid-based pap, screening  2. Vaginal itching  -     Molecular Vaginal Panel  3. Abnormal uterine bleeding (AUB)  -     Tranexamic Acid 650 MG TABS; Take 2 tablets (1,300 mg total) by mouth 3 (three) times a day for 5 days    CC:  Annual Gynecologic Examination    HPI: Megan Richmond is a 44 y.o.  who presents for annual gynecologic examination.  Gynecologic Exam      She reports no new changes to her health.  She reports no breast concerns. Mother had history of breast cancer, diagnosed at age 40, has been getting annual mammos since age 37.  Discussed supplmental screening with ABUS or MRI.  States her cycles are heavy and painful. Motrin helps. Discussed Lysteda - will trial  States cycles have come slightly earlier.     The following portions of the patient's history were reviewed and updated as appropriate: allergies, current medications, past family history, past medical history, obstetric history, gynecologic history, past social history, past surgical history and problem list.    Review of Systems   Constitutional: Negative.    HENT: Negative.     Eyes: Negative.    Respiratory: Negative.     Cardiovascular: Negative.    Gastrointestinal: Negative.    Genitourinary: Negative.    Musculoskeletal: Negative.    All other systems reviewed and are negative.        Objective:  /70   Ht 5' 5\" (1.651 m)   Wt 66.1 kg (145 lb " 12.8 oz)   LMP 12/03/2024 (Approximate)   BMI 24.26 kg/m²    Physical Exam  Vitals reviewed.   Constitutional:       General: She is not in acute distress.     Appearance: She is well-developed.   HENT:      Head: Normocephalic and atraumatic.      Nose: Nose normal.   Cardiovascular:      Rate and Rhythm: Normal rate.   Pulmonary:      Effort: Pulmonary effort is normal. No respiratory distress.   Chest:   Breasts:     Breasts are symmetrical.      Right: Normal. No mass, nipple discharge, skin change or tenderness.      Left: Normal. No mass, nipple discharge, skin change or tenderness.   Abdominal:      General: There is no distension.      Palpations: Abdomen is soft. There is no mass.      Tenderness: There is no abdominal tenderness. There is no guarding or rebound.   Genitourinary:     General: Normal vulva.      Exam position: Lithotomy position.      Labia:         Right: No lesion.         Left: No lesion.       Urethra: No prolapse (urethral meatus normal).      Vagina: Normal. No vaginal discharge, erythema or bleeding.      Cervix: Normal.      Uterus: Normal.       Adnexa: Right adnexa normal and left adnexa normal.   Musculoskeletal:         General: Normal range of motion.      Cervical back: Normal range of motion.   Lymphadenopathy:      Upper Body:      Right upper body: No supraclavicular, axillary or pectoral adenopathy.      Left upper body: No supraclavicular, axillary or pectoral adenopathy.      Lower Body: No right inguinal adenopathy. No left inguinal adenopathy.   Skin:     General: Skin is warm and dry.   Neurological:      Mental Status: She is alert and oriented to person, place, and time.   Psychiatric:         Behavior: Behavior normal.         Thought Content: Thought content normal.         Judgment: Judgment normal.

## 2024-12-08 LAB
C GLABRATA DNA VAG QL NAA+PROBE: NEGATIVE
C KRUSEI DNA VAG QL NAA+PROBE: NEGATIVE
CANDIDA SP 6 PNL VAG NAA+PROBE: NEGATIVE
T VAGINALIS DNA VAG QL NAA+PROBE: NEGATIVE
VAGINOSIS/ITIS DNA PNL VAG PROBE+SIG AMP: NEGATIVE

## 2024-12-09 LAB
HPV HR 12 DNA CVX QL NAA+PROBE: NEGATIVE
HPV16 DNA CVX QL NAA+PROBE: NEGATIVE
HPV18 DNA CVX QL NAA+PROBE: NEGATIVE

## 2024-12-12 ENCOUNTER — RESULTS FOLLOW-UP (OUTPATIENT)
Dept: FAMILY MEDICINE CLINIC | Facility: CLINIC | Age: 44
End: 2024-12-12

## 2024-12-12 ENCOUNTER — CONSULT (OUTPATIENT)
Dept: UROLOGY | Facility: MEDICAL CENTER | Age: 44
End: 2024-12-12
Payer: COMMERCIAL

## 2024-12-12 VITALS
HEART RATE: 80 BPM | OXYGEN SATURATION: 99 % | DIASTOLIC BLOOD PRESSURE: 72 MMHG | SYSTOLIC BLOOD PRESSURE: 114 MMHG | BODY MASS INDEX: 23.82 KG/M2 | WEIGHT: 143 LBS | HEIGHT: 65 IN

## 2024-12-12 DIAGNOSIS — N30.90 CYSTITIS: Primary | ICD-10-CM

## 2024-12-12 DIAGNOSIS — N32.89 BLADDER SPASM: ICD-10-CM

## 2024-12-12 DIAGNOSIS — R39.14 FEELING OF INCOMPLETE BLADDER EMPTYING: ICD-10-CM

## 2024-12-12 LAB
LAB AP GYN PRIMARY INTERPRETATION: NORMAL
Lab: NORMAL

## 2024-12-12 PROCEDURE — 99204 OFFICE O/P NEW MOD 45 MIN: CPT

## 2024-12-12 RX ORDER — MIRABEGRON 25 MG/1
25 TABLET, FILM COATED, EXTENDED RELEASE ORAL DAILY
Qty: 30 TABLET | Refills: 3 | Status: SHIPPED | OUTPATIENT
Start: 2024-12-12

## 2024-12-12 NOTE — ASSESSMENT & PLAN NOTE
We discussed pharmacotherapy in the forms of anticholinergic versus beta 3 agonist for treatment of her urinary frequency and urgency.  After discussion, the patient was interested in trying Myrbetriq which is a beta 3 agonist agent for treatment of urinary urgency.  We discussed over-the-counter remedies for UTI prevention including d-mannose, probiotic, cranberry pill supplement, and vitamin C.  Additionally, I recommended a brand Cystex that combines all these agents into 1 pill.  We discussed that a standing urine culture order could be placed, so that if she experiences UTI-like symptoms again that she can call the office and go to the lab to have her urine tested.  Patient will trial Myrbetriq 25 mg once daily for treatment of her lower urinary tract symptoms.  Additionally, the patient will initiate Cystex for UTI prevention.  If the patient's symptoms continue to persist then we will consider increasing her dose to Myrbetriq 50 mg once daily.

## 2024-12-12 NOTE — PATIENT INSTRUCTIONS
UTI prevention:  - Vit C supplement 1g daily.  - D-mannose 2g daily (Do not use if you are diabetic as it contains sugar)  - Cranberry pill supplement 500 mg daily   - Prebiotic and Probiotic (look for products with Lactobacillus and Bifidobacterium with 10-25 billion units)     CYSTEX - brand that combines all the above agents into 1 pill or powder form

## 2024-12-12 NOTE — PROGRESS NOTES
12/12/2024      Assessment and Plan    44 y.o. female new patient to [facility] for urology    Feeling of incomplete bladder emptying  We discussed pharmacotherapy in the forms of anticholinergic versus beta 3 agonist for treatment of her urinary frequency and urgency.  After discussion, the patient was interested in trying Myrbetriq which is a beta 3 agonist agent for treatment of urinary urgency.  We discussed over-the-counter remedies for UTI prevention including d-mannose, probiotic, cranberry pill supplement, and vitamin C.  Additionally, I recommended a brand Cystex that combines all these agents into 1 pill.  We discussed that a standing urine culture order could be placed, so that if she experiences UTI-like symptoms again that she can call the office and go to the lab to have her urine tested.  Patient will trial Myrbetriq 25 mg once daily for treatment of her lower urinary tract symptoms.  Additionally, the patient will initiate Cystex for UTI prevention.  If the patient's symptoms continue to persist then we will consider increasing her dose to Myrbetriq 50 mg once daily.        History of Present Illness  Megan Richmond is a 44 y.o. female here for evaluation of bladder spasms and feelings of incomplete bladder emptying.  Patient was referred to our practice by her PCP after complaining about bladder spasms and feelings of incomplete bladder emptying.  Patient went ultrasound of the kidney and bladder with PVR on 11/25/2024 which noted a PVR of 16.1.  Additionally, noted mild fullness of the right renal collecting system this is of indeterminate significance of the right ureteral jet was demonstrated in the bladder.  Today, the patient reports that she was evaluated at an urgent care on 2 separate occasions for ongoing urinary tract infection.  Patient notes that after completing her course of antibiotics for first urinary tract infection, that she continued to experience sensation of urinary urgency  "as well as feelings of incomplete bladder emptying.  Patient notes that initially she was waking up 10 times a night with a sensation of having to go to the bathroom.  Patient reported feelings of incomplete bladder emptying because even after using the restroom she would get the sensation to have to go to the restroom again.  Patient notes that this has improved to around 4 times a night and is not nearly as bad, but does continue to experience this urgent feeling.  Patient denies dysuria, hematuria, flank pain, worsening urinary frequency, or urinary incontinence.        Review of Systems   Constitutional:  Negative for chills and fever.   HENT:  Negative for ear pain and sore throat.    Eyes:  Negative for pain and visual disturbance.   Respiratory:  Negative for cough and shortness of breath.    Cardiovascular:  Negative for chest pain and palpitations.   Gastrointestinal:  Negative for abdominal pain and vomiting.   Genitourinary:  Positive for frequency and urgency. Negative for decreased urine volume, difficulty urinating, dysuria, flank pain and hematuria.   Musculoskeletal:  Negative for arthralgias and back pain.   Skin:  Negative for color change and rash.   Neurological:  Negative for seizures and syncope.   All other systems reviewed and are negative.               Vitals  Vitals:    12/12/24 1014   BP: 114/72   Pulse: 80   SpO2: 99%   Weight: 64.9 kg (143 lb)   Height: 5' 5\" (1.651 m)       Physical Exam  Vitals reviewed.   Constitutional:       General: She is not in acute distress.     Appearance: Normal appearance. She is not ill-appearing.   HENT:      Head: Normocephalic and atraumatic.      Nose: Nose normal.   Eyes:      General: No scleral icterus.  Pulmonary:      Effort: No respiratory distress.   Abdominal:      General: Abdomen is flat. There is no distension.      Palpations: Abdomen is soft.      Tenderness: There is no abdominal tenderness.   Musculoskeletal:         General: Normal " range of motion.      Cervical back: Normal range of motion.   Skin:     General: Skin is warm.      Coloration: Skin is not jaundiced.   Neurological:      Mental Status: She is alert and oriented to person, place, and time.      Gait: Gait normal.   Psychiatric:         Mood and Affect: Mood normal.         Behavior: Behavior normal.           Past History  Past Medical History:   Diagnosis Date    Abnormal ECG     Abnormal Pap smear of cervix     Allergic     Seasonal, skin in right nostril unhealing fissure, improved    Anxiety 3/2020    Arthritis 02/2021    Unable to open jars or use thimbs without sharp pain    Depression     IBS (irritable bowel syndrome)     Ovarian cyst     Plantar fasciitis     Varicella     As a child     Social History     Socioeconomic History    Marital status: /Civil Union     Spouse name: Not on file    Number of children: Not on file    Years of education: Not on file    Highest education level: Not on file   Occupational History    Not on file   Tobacco Use    Smoking status: Never    Smokeless tobacco: Never   Vaping Use    Vaping status: Never Used   Substance and Sexual Activity    Alcohol use: Yes     Alcohol/week: 4.0 standard drinks of alcohol     Types: 4 Glasses of wine per week     Comment: 1 glass of wine per night    Drug use: Not Currently    Sexual activity: Yes     Partners: Male     Birth control/protection: Female Sterilization   Other Topics Concern    Not on file   Social History Narrative    Not on file     Social Drivers of Health     Financial Resource Strain: Not on file   Food Insecurity: Not on file   Transportation Needs: Not on file   Physical Activity: Not on file   Stress: Not on file   Social Connections: Not on file   Intimate Partner Violence: Not on file   Housing Stability: Not on file     Social History     Tobacco Use   Smoking Status Never   Smokeless Tobacco Never     Family History   Problem Relation Age of Onset    Breast cancer Mother  "40        Brca negative    Rheum arthritis Mother     Throat cancer Mother     Cancer Mother         Breast and oropharyngeal CA    Arthritis Mother         Rheumatoid arthritis    Hyperlipidemia Mother         Controlled with a statin    Hypertension Father     Heart disease Father     Sleep apnea Father     Depression Father         Undiagnosed specific    Anxiety disorder Father     Deep vein thrombosis Father     Rashes / Skin problems Father         Multiple precancerous spots removed    Arthritis Father         Osteo, severe    Clotting disorder Father         Xarelto for DVTs    Cancer Sister 40        Multiple myeloma diagnosed feb this year    No Known Problems Daughter     Breast cancer Maternal Grandmother 80    No Known Problems Maternal Grandfather     No Known Problems Paternal Grandmother     No Known Problems Paternal Grandfather     No Known Problems Maternal Aunt     No Known Problems Maternal Aunt     No Known Problems Paternal Aunt     No Known Problems Paternal Aunt        The following portions of the patient's history were reviewed and updated as appropriate: allergies, current medications, past medical history, past social history, past surgical history and problem list.    Results  No results found for this or any previous visit (from the past hour).]  No results found for: \"PSA\"  Lab Results   Component Value Date    CALCIUM 8.7 11/11/2024    K 4.4 11/11/2024    CO2 27 11/11/2024     11/11/2024    BUN 12 11/11/2024    CREATININE 0.75 11/11/2024     Lab Results   Component Value Date    WBC 5.97 11/11/2024    HGB 12.3 11/11/2024    HCT 38.1 11/11/2024    MCV 96 11/11/2024     11/11/2024      "

## 2024-12-13 ENCOUNTER — RESULTS FOLLOW-UP (OUTPATIENT)
Dept: OBGYN CLINIC | Facility: MEDICAL CENTER | Age: 44
End: 2024-12-13

## 2025-01-03 ENCOUNTER — EVALUATION (OUTPATIENT)
Dept: PHYSICAL THERAPY | Facility: CLINIC | Age: 45
End: 2025-01-03
Payer: COMMERCIAL

## 2025-01-03 DIAGNOSIS — M25.521 RIGHT ELBOW PAIN: Primary | ICD-10-CM

## 2025-01-03 DIAGNOSIS — M77.11 LATERAL EPICONDYLITIS OF RIGHT ELBOW: ICD-10-CM

## 2025-01-03 PROCEDURE — 97161 PT EVAL LOW COMPLEX 20 MIN: CPT | Performed by: PHYSICAL THERAPIST

## 2025-01-03 PROCEDURE — 97110 THERAPEUTIC EXERCISES: CPT | Performed by: PHYSICAL THERAPIST

## 2025-01-03 NOTE — PROGRESS NOTES
PT Evaluation     Today's date: 1/3/2025  Patient name: Megan Richmond  : 1980  MRN: [id number]  Referring provider: Montserrat Spencer CRNP  Dx:   Encounter Diagnosis     ICD-10-CM    1. Right elbow pain  M25.521       2. Lateral epicondylitis of right elbow  M77.11 Ambulatory Referral to Physical Therapy          Start Time: 1415  Stop Time: 1500  Total time in clinic (min): 45 minutes    Assessment/Plan  Ms. Richmond is a 44 y.o. female presenting to outpatient physical therapy with R elbow pain which restricts their ability to participate in ADLs, work, and recreational activities without pain. Functional limitations are result of the following impairments: TTP common extensor tendon, decreased wrist extension strength, decreased forearm strength, decreased wrist ROM, and pain with function. Symptoms are consistent with pathoanatomical diagnosis is lateral epicondylitis. PMH is sig for anxiety. No further referral appears necessary at this time based upon examination results    Patient was given the opportunity to ask questions, and all questions were answered to the patient's satisfaction. The patients's greatest concerns are not knowing why they have pain, not being able to stay active, not being able to care for self or others, and getting back to recreational activity or sport. Patient appears motivated, agrees with the POC, and is a good candidate for skilled physical therapy at this time. Patient was provided with handout of HEP consisting of wrist stretching and wrist extension eccentrics    Symptom irritability: Low   Understanding of Dx/Px/POC: good  Prognosis: good  Negative prognostic indicators include: n/a    Goals  STG (3 weeks)  Pain: Patient will subjectively report maximum pain decreased by 2/10  Strength: Patient's will demonstrate R  strength improved by 5 lbs  ROM: Patient will increase R wrist  ROM by 25% without pain  Function: Patient increase score on FOTO by 10 points    LTG (6  weeks)  Pain: Patient will subjectively report less than 2/10 pain with functional activities.  Strength: Patient's will demonstrate R  strength improved by 10 lb  ROM: Patient will increase R wrist ROM to at least 50% without pain  Function: Patient will increase score on FOTO to predicted value or better  Patient will be ind with HEP by DC    Plan  Plan details: Prognosis above is given PT services 1-2x/week over the next 6 weeks and home program adherence.   Patient would benefit from: skilled physical therapy, home exercise program  Referral necessary: no  Planned modality interventions: Hydrocollator packs, Cryotherapy, TENS, Low level laser, and Cupping  Planned therapy interventions: joint mobilization, manual therapy, massage, neuromuscular re-education, patient education, stretching, strengthening, therapeutic activities, therapeutic exercise, home exercise program, functional ROM exercises, gait training, flexibility, balance, abdominal trunk stabilization, motor coordination training, coordination and behavior modification  Frequency: 1-2x/week for 6 weeks  Duration in visits: 6-12  Plan of Care beginning date: 1/3/2025   Plan of Care expiration date: 2/14/2025   Treatment plan discussed with: patient    Subjective  IE (1/3/2025): Patient is a 44 y.o. female who presents for an initial outpatient physical therapy consultation regarding their R elbow pain. Patient reports that her pain began in July, after been on a vacation that she did some paddle boarding. She states that she also took up pickleball during that time. She was hoping the pain with go away on its on, but it hasn't. It is steadily worsening. She has been attempting to rest from exercise to help. Patient notes that lifting her 6 year old son has also gotten painful.     Aggravating factors: repetitive elbow flexion or forearm supination, reaching up, opening jars  Alleviating factors: rest, meds  Functional limitations: ADLs, work,  "recreations    Pain  Best: 0/10  Worst: 8/10  Irritability: minutes  Location: R elbow  Quality: burning, sharp  Progression: worsening.    Social Support  Employment status: RN admin, currently in between jobs.   Hobbies/Recreational Activities: pickleball, home gym    Diagnostic Tests  None to date     Patient Goals for Therapy  \"Get stronger, play  with kids without pain\"    Objective  Palpation:   (+) TTP common wrist extensor tendon      Strength:  Elbow flexion 4+/5  Elbow extension 4+/5  Wrist flexion 5/5  Wrist extension 4/5, P!  Supination 4/5  Pronation 4/5  Extended elbow : R 60 lb (15 lb before pain), L 62 lb  Flexed elbow : R 65 lb (40 lb before pain), L 68 lb      Tests:  Cozen's: (+)  Mill's: (+)  Maudley's (-)  Radial tinel's (-)         Precautions: n/a      Manuals 1/3            Wrist extensor STM JF            IASTM                                       Neuro Re-Ed                                                                                                        Ther Ex             UBE             Wrist extensor stretch HEP            Wrist extensor eccentrics HEP            Hammer Sup/pro             Lat epi self-massage HEP                                                                Ther Activity                                       Gait Training                                       Modalities             LASER Epicondylitis  1.5 min  12 w                              "

## 2025-01-06 ENCOUNTER — APPOINTMENT (OUTPATIENT)
Dept: PHYSICAL THERAPY | Facility: CLINIC | Age: 45
End: 2025-01-06
Payer: COMMERCIAL

## 2025-01-06 DIAGNOSIS — G57.92 NEURITIS OF LEFT FOOT: Primary | ICD-10-CM

## 2025-01-06 RX ORDER — GABAPENTIN 300 MG/1
300 CAPSULE ORAL
Qty: 90 CAPSULE | Refills: 0 | Status: SHIPPED | OUTPATIENT
Start: 2025-01-06 | End: 2025-04-06

## 2025-01-08 ENCOUNTER — OFFICE VISIT (OUTPATIENT)
Dept: PHYSICAL THERAPY | Facility: CLINIC | Age: 45
End: 2025-01-08
Payer: COMMERCIAL

## 2025-01-08 DIAGNOSIS — M25.521 RIGHT ELBOW PAIN: ICD-10-CM

## 2025-01-08 DIAGNOSIS — M77.11 LATERAL EPICONDYLITIS OF RIGHT ELBOW: Primary | ICD-10-CM

## 2025-01-08 PROCEDURE — 97112 NEUROMUSCULAR REEDUCATION: CPT | Performed by: PHYSICAL THERAPIST

## 2025-01-08 PROCEDURE — 97140 MANUAL THERAPY 1/> REGIONS: CPT | Performed by: PHYSICAL THERAPIST

## 2025-01-08 PROCEDURE — 97110 THERAPEUTIC EXERCISES: CPT | Performed by: PHYSICAL THERAPIST

## 2025-01-08 NOTE — PROGRESS NOTES
"Daily Note     Today's date: 2025  Patient name: Megan Richmond  : 1980  MRN: [id number]  Referring provider: Montserrat Spencer CRNP  Dx:   Encounter Diagnosis     ICD-10-CM    1. Lateral epicondylitis of right elbow  M77.11       2. Right elbow pain  M25.521                      Subjective: Patient reports that she really feels the stretch at home. HEP going well.       Objective: See treatment diary below      Assessment: Reviewed HEP to ensure proper form and to answer any questions regarding prescribed exercises. Patient demonstrates good recall and form with minimal cuing.  Patient able to tolerate all exercises as prescribed. Continue to progress as tolerated. Patient will continue to benefit from skilled PT in order to address impairments and improve function.       Plan: Continue per plan of care.  Progress treatment as tolerated.       Precautions: n/a      Manuals 1/3 18           Wrist extensor STM JF JF           IASTM  JF                                     Neuro Re-Ed             Power web   Red  2x10           Power web finger extension  Red  2x10           Flexbar sup/pro  Red  2x10           Low row to overhead angels  Yellow  2x10           Rows  GTB  2x10                                     Ther Ex             UBE             Wrist extensor stretch HEP 10x10\"           Wrist extensor eccentrics HEP 2x15  5#           Hammer Sup/pro  3#  2x10           Lat epi self-massage HEP                                                                Ther Activity                                       Gait Training                                       Modalities             LASER Epicondylitis  1.5 min  12 w Epi  1,5 min  13 W                             "

## 2025-01-10 ENCOUNTER — OFFICE VISIT (OUTPATIENT)
Dept: PHYSICAL THERAPY | Facility: CLINIC | Age: 45
End: 2025-01-10
Payer: COMMERCIAL

## 2025-01-10 DIAGNOSIS — M77.11 LATERAL EPICONDYLITIS OF RIGHT ELBOW: Primary | ICD-10-CM

## 2025-01-10 DIAGNOSIS — M25.521 RIGHT ELBOW PAIN: ICD-10-CM

## 2025-01-10 PROCEDURE — 97140 MANUAL THERAPY 1/> REGIONS: CPT | Performed by: PHYSICAL THERAPIST

## 2025-01-10 PROCEDURE — 97112 NEUROMUSCULAR REEDUCATION: CPT | Performed by: PHYSICAL THERAPIST

## 2025-01-10 PROCEDURE — 97110 THERAPEUTIC EXERCISES: CPT | Performed by: PHYSICAL THERAPIST

## 2025-01-10 NOTE — PROGRESS NOTES
"Daily Note     Today's date: 1/10/2025  Patient name: Megan Richmond  : 1980  MRN: [id number]  Referring provider: Montserrat Spencer CRNP  Dx:   Encounter Diagnosis     ICD-10-CM    1. Lateral epicondylitis of right elbow  M77.11       2. Right elbow pain  M25.521                      Subjective: Patient reports that her elbow was sore following last visit. Not feeling too bad today.       Objective: See treatment diary below      Assessment: Patient was able to tolerate progression of resistance exercises without an increase in pain. They demonstrated muscular fatigue as expected with progression. Able to add more shoulder/elbow stabilization without an increase in pain. Updated patient's HEP and provided handout. Continue to progress as tolerated. Patient will continue to benefit from skilled PT in order to address impairments and improve function.       Plan: Continue per plan of care.  Progress treatment as tolerated.       Precautions: n/a      Manuals /3  110          Wrist extensor STM JF JF JF          IASTM  JF JF          Cupping   Small cup    JF          Radial head mob   JF   Gr 3          Neuro Re-Ed             Power web   Red  2x10 Red  2x10          Power web finger extension  Red  2x10 Red  2x10          Flexbar sup/pro  Red  2x10 Red  2x10          Low row to overhead angels  Yellow  2x10 Red  3x10          Rows  GTB  2x10 BTB  3x10          Wall circles   X30 cw/ccw                       Ther Ex             UBE             Wrist extensor stretch HEP 10x10\" 10x10\"          Wrist extensor eccentrics HEP 2x15  5# 2x15  6#          Hammer Sup/pro  3#  2x10 3#  2x10          Lat epi self-massage HEP                                                                Ther Activity                                       Gait Training                                       Modalities             LASER Epicondylitis  1.5 min  12 w Epi  1,5 min  13 W Epi  1.5 min  14 W                            "

## 2025-01-13 ENCOUNTER — OFFICE VISIT (OUTPATIENT)
Dept: PHYSICAL THERAPY | Facility: CLINIC | Age: 45
End: 2025-01-13
Payer: COMMERCIAL

## 2025-01-13 DIAGNOSIS — M25.521 RIGHT ELBOW PAIN: Primary | ICD-10-CM

## 2025-01-13 DIAGNOSIS — M77.11 LATERAL EPICONDYLITIS OF RIGHT ELBOW: ICD-10-CM

## 2025-01-13 PROCEDURE — 97112 NEUROMUSCULAR REEDUCATION: CPT | Performed by: PHYSICAL THERAPIST

## 2025-01-13 PROCEDURE — 97110 THERAPEUTIC EXERCISES: CPT | Performed by: PHYSICAL THERAPIST

## 2025-01-13 PROCEDURE — 97140 MANUAL THERAPY 1/> REGIONS: CPT | Performed by: PHYSICAL THERAPIST

## 2025-01-13 NOTE — PROGRESS NOTES
"Daily Note     Today's date: 2025  Patient name: Megan Richmond  : 1980  MRN: [id number]  Referring provider: Montserrat Spencer CRNP  Dx:   Encounter Diagnosis     ICD-10-CM    1. Right elbow pain  M25.521       2. Lateral epicondylitis of right elbow  M77.11                      Subjective: Patient reports that her elbow was mildly sore over the weekend, but she is noticing improvement       Objective: See treatment diary below      Assessment: Patient was able to tolerate all exercises without an increase in pain. Soft tissue restrictions that were noted during IASTM are improving. Patient reported less soreness post-workout today. Continue to progress as tolerated. Patient will continue to benefit from skilled PT in order to address impairments and improve function.       Plan: Continue per plan of care.  Progress treatment as tolerated.       Precautions: n/a      Manuals 1/3 1/8 1/10 1/13         Wrist extensor STM JF JF JF JF         IASTM  JF JF JF         Cupping   Small cup    JF          Radial head mob   JF   Gr 3 JF   gr 3         Neuro Re-Ed             Power web   Red  2x10 Red  2x10 Red  2x10         Power web finger extension  Red  2x10 Red  2x10 Red 2x10         Flexbar sup/pro  Red  2x10 Red  2x10 Green  2x10         Flexbar  Eccentric wrist extension             Low row to overhead angels  Yellow  2x10 Red  3x10 Red 3x10         Rows  GTB  2x10 BTB  3x10 BTB  3x10         Wall circles   X30 cw/ccw X30 cw/ccw                      Ther Ex             UBE             Wrist extensor stretch HEP 10x10\" 10x10\" 10x10\"         Wrist extensor eccentrics HEP 2x15  5# 2x15  6# 2x15  6#         Hammer Sup/pro  3#  2x10 3#  2x10 3#  2x10         Lat epi self-massage HEP                                                                Ther Activity                                       Gait Training                                       Modalities             LASER Epicondylitis  1.5 min  12 w " Epi  1,5 min  13 W Epi  1.5 min  14 W Epi  1.5 min  15W

## 2025-01-17 ENCOUNTER — APPOINTMENT (OUTPATIENT)
Dept: PHYSICAL THERAPY | Facility: CLINIC | Age: 45
End: 2025-01-17
Payer: COMMERCIAL

## 2025-01-20 ENCOUNTER — OFFICE VISIT (OUTPATIENT)
Dept: PHYSICAL THERAPY | Facility: CLINIC | Age: 45
End: 2025-01-20
Payer: COMMERCIAL

## 2025-01-20 DIAGNOSIS — M77.11 LATERAL EPICONDYLITIS OF RIGHT ELBOW: Primary | ICD-10-CM

## 2025-01-20 DIAGNOSIS — M25.521 RIGHT ELBOW PAIN: ICD-10-CM

## 2025-01-20 PROCEDURE — 97112 NEUROMUSCULAR REEDUCATION: CPT | Performed by: PHYSICAL THERAPIST

## 2025-01-20 PROCEDURE — 97140 MANUAL THERAPY 1/> REGIONS: CPT | Performed by: PHYSICAL THERAPIST

## 2025-01-20 PROCEDURE — 97110 THERAPEUTIC EXERCISES: CPT | Performed by: PHYSICAL THERAPIST

## 2025-01-20 NOTE — PROGRESS NOTES
"Daily Note     Today's date: 2025  Patient name: Megan Richmond  : 1980  MRN: [id number]  Referring provider: Montserrat Spencer CRNP  Dx:   Encounter Diagnosis     ICD-10-CM    1. Lateral epicondylitis of right elbow  M77.11       2. Right elbow pain  M25.521                      Subjective: Patient reports that she continues to notice improvement in her symptoms. She remains careful while playing with her kids secondary to elbow discomfort.       Objective: See treatment diary below      Assessment: Patient was able to tolerate progression of resistance exercises without an increase in pain. They demonstrated muscular fatigue as expected with progression.   Soft tissue restrictions that were noted during IASTM are improving. Patient reported less soreness post-workout today. Continue to progress as tolerated. Patient will continue to benefit from skilled PT in order to address impairments and improve function.       Plan: Continue per plan of care.  Progress treatment as tolerated.       Precautions: n/a      Manuals 1/3 1/8 1/10 1/13 1/20      Wrist extensor STM JF JF JF JF JF      IASTM  JF JF JF JF      Cupping   Small cup    JF        Radial head mob   JF   Gr 3 JF   gr 3       Neuro Re-Ed           Power web   Red  2x10 Red  2x10 Red  2x10 Green  2x10      Power web finger extension  Red  2x10 Red  2x10 Red 2x10 Red  2x10      Flexbar sup/pro  Red  2x10 Red  2x10 Green  2x10 Green  2x10      Flexbar  Eccentric wrist extension    Red   2x10 Red  2x10      Low row to overhead angels  Yellow  2x10 Red  3x10 Red 3x10 Red  3x10      Rows  GTB  2x10 BTB  3x10 BTB  3x10 Purple  3x10      Wall circles   X30 cw/ccw X30 cw/ccw X30 cw/ccw      SA wall slides     Towel  2x10                            Ther Ex           UBE           Wrist extensor stretch HEP 10x10\" 10x10\" 10x10\" 10x10\"      Wrist extensor eccentrics HEP 2x15  5# 2x15  6# 2x15  6# 2x15  7#      Hammer Sup/pro  3#  2x10 3#  2x10 3#  2x10 " 3#  2x10      Lat epi self-massage HEP                                                      Ther Activity                                 Gait Training                                 Modalities           LASER Epicondylitis  1.5 min  12 w Epi  1,5 min  13 W Epi  1.5 min  14 W Epi  1.5 min  15W Epi  1.5 min  15w

## 2025-01-24 ENCOUNTER — OFFICE VISIT (OUTPATIENT)
Dept: PHYSICAL THERAPY | Facility: CLINIC | Age: 45
End: 2025-01-24
Payer: COMMERCIAL

## 2025-01-24 DIAGNOSIS — M77.11 LATERAL EPICONDYLITIS OF RIGHT ELBOW: ICD-10-CM

## 2025-01-24 DIAGNOSIS — M25.521 RIGHT ELBOW PAIN: Primary | ICD-10-CM

## 2025-01-24 PROCEDURE — 97110 THERAPEUTIC EXERCISES: CPT | Performed by: PHYSICAL THERAPIST

## 2025-01-24 PROCEDURE — 97140 MANUAL THERAPY 1/> REGIONS: CPT | Performed by: PHYSICAL THERAPIST

## 2025-01-24 PROCEDURE — 97112 NEUROMUSCULAR REEDUCATION: CPT | Performed by: PHYSICAL THERAPIST

## 2025-01-24 NOTE — PROGRESS NOTES
"Daily Note     Today's date: 2025  Patient name: Megan Richmond  : 1980  MRN: [id number]  Referring provider: Montserrat Spencer CRNP  Dx:   Encounter Diagnosis     ICD-10-CM    1. Right elbow pain  M25.521       2. Lateral epicondylitis of right elbow  M77.11                      Subjective: Patient reports that she feels like her arm is getting stronger. Notes that she continues to get pain with \"small movements\" such as dressing, putting on a coat, or picking up a coffee cup.     Objective: See treatment diary below      Assessment: Patient was able to tolerate all exercises as prescribed. Soft tissue restrictions that were noted during IASTM are improving. Discussed counter force bracing during activity or night wrist splints as options for treatment of stubborn symptoms. Continue to progress as tolerated. Patient will continue to benefit from skilled PT in order to address impairments and improve function.       Plan: Continue per plan of care.  Progress treatment as tolerated.       Precautions: n/a      Manuals 1/3 1/8 1/10 1/13 1/20 1/24     Wrist extensor STM JF JF JF JF JF JF     IASTM  JF JF JF JF JF     Cupping   Small cup    JF        Radial head mob   JF   Gr 3 JF   gr 3       Neuro Re-Ed           Power web   Red  2x10 Red  2x10 Red  2x10 Green  2x10 Green  2x10     Power web finger extension  Red  2x10 Red  2x10 Red 2x10 Red  2x10 held     Flexbar sup/pro  Red  2x10 Red  2x10 Green  2x10 Green  2x10 Green  2x10     Flexbar  Eccentric wrist extension    Red   2x10 Red  2x10 Red  2x10     Low row to overhead angels  Yellow  2x10 Red  3x10 Red 3x10 Red  3x10 Red  3x10     Rows  GTB  2x10 BTB  3x10 BTB  3x10 Purple  3x10 Purple  3x10     Wall circles   X30 cw/ccw X30 cw/ccw X30 cw/ccw X30 cw/ccw     SA wall slides     Towel  2x10 Towel  2x10                           Ther Ex           UBE           Wrist extensor stretch HEP 10x10\" 10x10\" 10x10\" 10x10\" 10x10\"     Wrist extensor eccentrics " HEP 2x15  5# 2x15  6# 2x15  6# 2x15  7# 2x15  7#     Hammer Sup/pro  3#  2x10 3#  2x10 3#  2x10 3#  2x10 4#  2x10     Lat epi self-massage HEP                                                      Ther Activity                                 Gait Training                                 Modalities           LASER Epicondylitis  1.5 min  12 w Epi  1,5 min  13 W Epi  1.5 min  14 W Epi  1.5 min  15W Epi  1.5 min  15w

## 2025-01-27 ENCOUNTER — OFFICE VISIT (OUTPATIENT)
Dept: PHYSICAL THERAPY | Facility: CLINIC | Age: 45
End: 2025-01-27
Payer: COMMERCIAL

## 2025-01-27 DIAGNOSIS — M77.11 LATERAL EPICONDYLITIS OF RIGHT ELBOW: Primary | ICD-10-CM

## 2025-01-27 DIAGNOSIS — M25.521 RIGHT ELBOW PAIN: ICD-10-CM

## 2025-01-27 PROCEDURE — 97140 MANUAL THERAPY 1/> REGIONS: CPT | Performed by: PHYSICAL THERAPIST

## 2025-01-27 PROCEDURE — 97110 THERAPEUTIC EXERCISES: CPT | Performed by: PHYSICAL THERAPIST

## 2025-01-27 NOTE — PROGRESS NOTES
"Daily Note     Today's date: 2025  Patient name: Megan Richmond  : 1980  MRN: [id number]  Referring provider: Montserrat Spencer CRNP  Dx:   Encounter Diagnosis     ICD-10-CM    1. Lateral epicondylitis of right elbow  M77.11       2. Right elbow pain  M25.521                        Subjective: Patient states that she notices mild improvement in activity tolerance but discomfort remains with gripping and continues to avoid upper body exercises at the gym.    Objective: See treatment diary below      Assessment: Palpation continues to demonstrate soreness of the ECRB and was educated in self CFM.  Progressed HEP to utilize TB for eccentrics as she does not have a 7# weight at home.       Plan: Continue per plan of care.  Progress treatment as tolerated.       Precautions: n/a      Manuals 1/3 1/8 1/10 1/13 1/20 1/24 1/27    Wrist extensor STM JF JF JF JF JF JF 10'    IASTM  JF JF JF JF JF 8'    Cupping   Small cup    JF        Radial head mob   JF   Gr 3 JF   gr 3   G4 + distraction    Neuro Re-Ed           Power web   Red  2x10 Red  2x10 Red  2x10 Green  2x10 Green  2x10     Power web finger extension  Red  2x10 Red  2x10 Red 2x10 Red  2x10 held     Flexbar sup/pro  Red  2x10 Red  2x10 Green  2x10 Green  2x10 Green  2x10 Green 2x10    Flexbar  Eccentric wrist extension    Red   2x10 Red  2x10 Red  2x10 Green  2x10    Low row to overhead angels  Yellow  2x10 Red  3x10 Red 3x10 Red  3x10 Red  3x10     Rows  GTB  2x10 BTB  3x10 BTB  3x10 Purple  3x10 Purple  3x10     Wall circles   X30 cw/ccw X30 cw/ccw X30 cw/ccw X30 cw/ccw     SA wall slides     Towel  2x10 Towel  2x10                           Ther Ex           UBE           Wrist extensor stretch HEP 10x10\" 10x10\" 10x10\" 10x10\" 10x10\" 10 x10\"    Wrist extensor eccentrics HEP 2x15  5# 2x15  6# 2x15  6# 2x15  7# 2x15  7# 7# 2x10 + green TB 2x10    Hammer Sup/pro  3#  2x10 3#  2x10 3#  2x10 3#  2x10 4#  2x10 4# 2x10    Lat epi self-massage HEP            "                                           Ther Activity                                 Gait Training                                 Modalities           LASER Epicondylitis  1.5 min  12 w Epi  1,5 min  13 W Epi  1.5 min  14 W Epi  1.5 min  15W Epi  1.5 min  15w

## 2025-01-31 ENCOUNTER — APPOINTMENT (OUTPATIENT)
Dept: PHYSICAL THERAPY | Facility: CLINIC | Age: 45
End: 2025-01-31
Payer: COMMERCIAL

## 2025-02-06 ENCOUNTER — APPOINTMENT (OUTPATIENT)
Dept: PHYSICAL THERAPY | Facility: CLINIC | Age: 45
End: 2025-02-06
Payer: COMMERCIAL

## 2025-02-06 NOTE — PROGRESS NOTES
"Daily Note     Today's date: 2025  Patient name: Megan Richmond  : 1980  MRN: [id number]  Referring provider: Montserrat Spencer CRNP  Dx:   Encounter Diagnosis     ICD-10-CM    1. Lateral epicondylitis of right elbow  M77.11                          Subjective: Patient reported getting a good workout last session and feels that she is seeing some mild improvement in pain and function. Did try some CFM on her own at home and used ice following last session. Remains compliant with HEP.      Objective: See treatment diary below.      Assessment: Palpation of ECRB reveals continued restrictions and tenderness. Slight reduction in fatigue with Flexbar today, indicating improved eccentric strength and control. Skilled PT remains appropriate to further improve pain and tolerance to daily activities and functions as we have seen some progress with conservative management.      Plan: Continue per plan of care.  Progress treatment as tolerated.           Precautions: n/a    Manuals 1/3 1/8 1/10 1/13 1/20 1/24 1/27 2/7   Wrist extensor STM JF JF JF JF JF JF 10' EH   IASTM  JF JF JF JF JF 8' EH   Cupping   Small cup    JF        Radial head mob   JF   Gr 3 JF   gr 3   G4 + distraction               Neuro Re-Ed           Power web   Red  2x10 Red  2x10 Red  2x10 Green  2x10 Green  2x10     Power web finger extension  Red  2x10 Red  2x10 Red 2x10 Red  2x10 held     Flexbar sup/pro  Red  2x10 Red  2x10 Green  2x10 Green  2x10 Green  2x10 Green 2x10 Green  2x10   Flexbar  Eccentric wrist extension    Red   2x10 Red  2x10 Red  2x10 Green  2x10 Green  2x10   Low row to overhead angels  Yellow  2x10 Red  3x10 Red 3x10 Red  3x10 Red  3x10     Rows  GTB  2x10 BTB  3x10 BTB  3x10 Purple  3x10 Purple  3x10     Wall circles   X30 cw/ccw X30 cw/ccw X30 cw/ccw X30 cw/ccw     SA wall slides     Towel  2x10 Towel  2x10                           Ther Ex           UBE           Wrist extensor stretch HEP 10x10\" 10x10\" 10x10\" 10x10\" " "10x10\" 10 x10\" 10\"x  10   Wrist extensor eccentrics HEP 2x15  5# 2x15  6# 2x15  6# 2x15  7# 2x15  7# 7# 2x10 + green TB 2x10 Green  3x10   Hammer Sup/pro  3#  2x10 3#  2x10 3#  2x10 3#  2x10 4#  2x10 4# 2x10 4#  3x10   Lat epi self-  massage HEP                                                      Ther Activity                                 Gait Training                                 Modalities           LASER Epicondylitis  1.5 min  12 w Epi  1,5 min  13 W Epi  1.5 min  14 W Epi  1.5 min  15W Epi  1.5 min  15w                      "

## 2025-02-07 ENCOUNTER — OFFICE VISIT (OUTPATIENT)
Dept: PHYSICAL THERAPY | Facility: CLINIC | Age: 45
End: 2025-02-07
Payer: COMMERCIAL

## 2025-02-07 DIAGNOSIS — M77.11 LATERAL EPICONDYLITIS OF RIGHT ELBOW: Primary | ICD-10-CM

## 2025-02-07 PROCEDURE — 97140 MANUAL THERAPY 1/> REGIONS: CPT

## 2025-02-07 PROCEDURE — 97110 THERAPEUTIC EXERCISES: CPT

## 2025-02-07 PROCEDURE — 97112 NEUROMUSCULAR REEDUCATION: CPT

## 2025-02-10 ENCOUNTER — OFFICE VISIT (OUTPATIENT)
Dept: PHYSICAL THERAPY | Facility: CLINIC | Age: 45
End: 2025-02-10
Payer: COMMERCIAL

## 2025-02-10 DIAGNOSIS — M25.521 RIGHT ELBOW PAIN: ICD-10-CM

## 2025-02-10 DIAGNOSIS — M77.11 LATERAL EPICONDYLITIS OF RIGHT ELBOW: Primary | ICD-10-CM

## 2025-02-10 PROCEDURE — 97112 NEUROMUSCULAR REEDUCATION: CPT | Performed by: PHYSICAL THERAPIST

## 2025-02-10 PROCEDURE — 97110 THERAPEUTIC EXERCISES: CPT | Performed by: PHYSICAL THERAPIST

## 2025-02-14 ENCOUNTER — EVALUATION (OUTPATIENT)
Dept: PHYSICAL THERAPY | Facility: CLINIC | Age: 45
End: 2025-02-14
Payer: COMMERCIAL

## 2025-02-14 DIAGNOSIS — M77.11 LATERAL EPICONDYLITIS OF RIGHT ELBOW: ICD-10-CM

## 2025-02-14 DIAGNOSIS — M25.521 RIGHT ELBOW PAIN: Primary | ICD-10-CM

## 2025-02-14 PROCEDURE — 97140 MANUAL THERAPY 1/> REGIONS: CPT | Performed by: PHYSICAL THERAPIST

## 2025-02-14 PROCEDURE — 97110 THERAPEUTIC EXERCISES: CPT | Performed by: PHYSICAL THERAPIST

## 2025-02-14 PROCEDURE — 97112 NEUROMUSCULAR REEDUCATION: CPT | Performed by: PHYSICAL THERAPIST

## 2025-02-14 NOTE — PROGRESS NOTES
PT Re-evaluation     Today's date: 2025  Patient name: Megan Richmond  : 1980  MRN: [id number]  Referring provider: Montserrat Spencer CRNP  Dx:   Encounter Diagnosis     ICD-10-CM    1. Right elbow pain  M25.521       2. Lateral epicondylitis of right elbow  M77.11 Ambulatory Referral to Physical Therapy          Assessment/Plan  Patient is a 44 y.o. female presenting to OP PT with R elbow pain. They have attended 10 visits over 6 weeks. Since starting therapy pt has made the following progress towards goals:  1) Pain: Patient reports that their max pain frequency has decreased by about 50%  2) Strength: Patient's wrist strength has improved to by at least 1/2 grade  3.) : Patient's  strength with a flexed elbow has improved to WNL without pain     Patient is progressing steadily. They continue to have deficits with wrist/forearm strength, pain with function, an wrist flexion AROM. Therapy has consisted of modalities, eccentric strengthening, and stretching. Their program will progress funcitonal strengthening and stretching exercises. Continue to progress as tolerated. Patient will continue to benefit from skilled PT in order to address impairments and improve function.          See updated goals below.    Goals  STG (3 weeks)  Pain: Patient will subjectively report maximum pain decreased by 2/10- progress made  Strength: Patient's will demonstrate R  strength improved by 5 lbs- MET  ROM: Patient will increase R wrist  ROM by 25% without pain- MET  Function: Patient increase score on FOTO by 10 points- MET    LTG (6 weeks)  Pain: Patient will subjectively report less than 2/10 pain with functional activities.- progress made  Strength: Patient's will demonstrate R  strength improved by 10 lb- progress made  ROM: Patient will increase R wrist ROM to at least 50% without pain- progress made  Function: Patient will increase score on FOTO to predicted value or better- progress made  Patient  will be ind with HEP by DC    Plan  Plan details: Prognosis above is given PT services 1-2x/week over the next 5 weeks and home program adherence.   Patient would benefit from: skilled physical therapy, home exercise program  Referral necessary: no  Planned modality interventions: Hydrocollator packs, Cryotherapy, TENS, Low level laser, and Cupping  Planned therapy interventions: joint mobilization, manual therapy, massage, neuromuscular re-education, patient education, stretching, strengthening, therapeutic activities, therapeutic exercise, home exercise program, functional ROM exercises, gait training, flexibility, balance, abdominal trunk stabilization, motor coordination training, coordination and behavior modification  Frequency: 1-2x/week for 5 weeks  Duration in visits: 5-10  Plan of Care beginning date: 2/14/2025    Plan of Care expiration date: 3/21/2025   Treatment plan discussed with: patient    Subjective  UPDATE 2/14/2025: Patient reports that her  feels stronger. She feels that her elbow feels better with most activities. Dressing, undressing, picking up coffee cup continue to be activities that bother her elbow the most. She feels that her elbow is 50% better overall.     IE (1/3/2025): Patient is a 44 y.o. female who presents for an initial outpatient physical therapy consultation regarding their R elbow pain. Patient reports that her pain began in July, after been on a vacation that she did some paddle boarding. She states that she also took up pickleball during that time. She was hoping the pain with go away on its on, but it hasn't. It is steadily worsening. She has been attempting to rest from exercise to help. Patient notes that lifting her 6 year old son has also gotten painful.     Aggravating factors: repetitive elbow flexion or forearm supination, reaching up, opening jars  Alleviating factors: rest, meds  Functional limitations: ADLs, work, recreations    Pain  Best: 0/10  Worst: 8/10,  "happening less frequently.   Irritability: minutes  Location: R elbow  Quality: burning, sharp  Progression: improving    Social Support  Employment status: RN admin, currently in between jobs.   Hobbies/Recreational Activities: pickleball, home gym    Diagnostic Tests  None to date     Patient Goals for Therapy  \"Get stronger, play  with kids without pain\"    Objective  Palpation:   (+) TTP common wrist extensor tendon      Strength:  Elbow flexion 5/5 (was 4+/5)  Elbow extension 5/5 (was 4+/5)  Wrist flexion 5/5  Wrist extension 4+/5 p! (Was 4/5, P!)  Supination 4+/5 (was 4/5)  Pronation 4+/5 (was 4/5)  Extended elbow : R 60 lb (15 lb before pain), L 62 lb  Flexed elbow : R 70 lb (was 65 lb (40 lb before pain), L 68 lb      Tests:  Cozen's: (+),  now (-)  Mill's: (+), now (-)  Maudley's (-)  Radial tinel's (-)         Precautions: n/a      Manuals 1/13 1/20 1/24 1/27 2/7 2/10  2/14   Wrist extensor STM JF JF JF 10' EH JF     IASTM JF JF JF 8' EH JF  JF   Cupping                 Radial head mob JF   gr 3     G4 + distraction                           Neuro Re-Ed              Re-eval   Power web  Red  2x10 Green  2x10 Green  2x10           Power web finger extension Red 2x10 Red  2x10 held           Flexbar sup/pro Green  2x10 Green  2x10 Green  2x10 Green 2x10 Green  2x10 Green  2x10  Green   Flexbar  Eccentric wrist extension Red   2x10 Red  2x10 Red  2x10 Green  2x10 Green  2x10 Green  2x10  Green  2x10   Low row to overhead angels Red 3x10 Red  3x10 Red  3x10     GTB  3x10  GTB  3x10   Rows BTB  3x10 Purple  3x10 Purple  3x10     Black  3x10  Black  3x10   Wall circles X30 cw/ccw X30 cw/ccw X30 cw/ccw     X30 cw/ccw  x30 cw/ccw   SA wall slides   Towel  2x10 Towel  2x10     Towel  2x10  Towel  2x10                                       Ther Ex                 UBE       2/2 2/2 3/3  held for re-eval   Wrist extensor stretch 10x10\" 10x10\" 10x10\" 10 x10\" 10\"x  10 10x10\"  10x10\"   Wrist extensor " eccentrics 2x15  6# 2x15  7# 2x15  7# 7# 2x10 + green TB 2x10 Green  3x10 Green  3x10  Green  3x10   Hammer Sup/pro 3#  2x10 3#  2x10 4#  2x10 4# 2x10 4#  3x10 4#  3x10  4#  3x10   Lat epi self-  massage                                                                                         Ther Activity                                                     Gait Training                                                     Modalities                 LASER Epi  1.5 min  15W Epi  1.5 min  15w       Epi  1.5 min  15w

## 2025-02-18 ENCOUNTER — OFFICE VISIT (OUTPATIENT)
Dept: PHYSICAL THERAPY | Facility: CLINIC | Age: 45
End: 2025-02-18
Payer: COMMERCIAL

## 2025-02-18 DIAGNOSIS — M77.11 LATERAL EPICONDYLITIS OF RIGHT ELBOW: Primary | ICD-10-CM

## 2025-02-18 DIAGNOSIS — M25.521 RIGHT ELBOW PAIN: ICD-10-CM

## 2025-02-18 PROCEDURE — 97140 MANUAL THERAPY 1/> REGIONS: CPT

## 2025-02-18 PROCEDURE — 97110 THERAPEUTIC EXERCISES: CPT

## 2025-02-18 PROCEDURE — 97112 NEUROMUSCULAR REEDUCATION: CPT

## 2025-02-18 NOTE — PROGRESS NOTES
"Daily Note     Today's date: 2025  Patient name: Megan Richmond  : 1980  MRN: [id number]  Referring provider: Montserrat Spencer CRNP  Dx:   Encounter Diagnosis     ICD-10-CM    1. Lateral epicondylitis of right elbow  M77.11       2. Right elbow pain  M25.521                      Subjective: Patient reported R elbow is feeling \"achy\" but overall seeing some improvement. Still limited with activities like throwing basketball and snowball with kids and lifting coffee cup with R UE. Has also noticed some discomfort with biceps strengthening at end range elbow flex and upon straightening. Started full body weight push-ups.      Objective: See treatment diary below.      Assessment: Manual interventions remain appropriate given restrictions through ECRB with positive response post administration. Continued with use of low level laser to aid in further reduction of inflammation and pain modulation. Good tolerance to current strengthening program with proper form demonstrated and improved eccentric control. Skilled PT remains appropriate to further address functional impairments that are affecting daily tasks and recreational activities.      Plan: Continue per plan of care.  Progress treatment as tolerated.           Precautions: n/a    Manuals 1/13 1/20 1/24 1/27 2/7 2/10  2/14 2/18   Wrist extensor STM JF JF JF 10' EH JF      IASTM JF JF JF 8' EH JF  JF EH   Cupping                  Radial head mob JF   gr 3     G4 + distraction                             Neuro Re-Ed              Re-eval    Power web  Red  2x10 Green  2x10 Green  2x10            Power web finger extension Red 2x10 Red  2x10 held            Flexbar sup/pro Green  2x10 Green  2x10 Green  2x10 Green 2x10 Green  2x10 Green  2x10  Green Green  2x10   Flexbar  Eccentric wrist extension Red   2x10 Red  2x10 Red  2x10 Green  2x10 Green  2x10 Green  2x10  Green  2x10 Green  2x10   Low row to overhead angels Red 3x10 Red  3x10 Red  3x10     " "GTB  3x10  GTB  3x10 GTB  3x10   Rows BTB  3x10 Purple  3x10 Purple  3x10     Black  3x10  Black  3x10 Black  3x10   Wall circles X30 cw/ccw X30 cw/ccw X30 cw/ccw     X30 cw/ccw  x30 cw/ccw x30 ea   SA wall slides   Towel  2x10 Towel  2x10     Towel  2x10  Towel  2x10 Towel  2x10                                         Ther Ex                  UBE       2/2 2/2 3/3  held for re-eval 3 min ea  L1   Wrist extensor stretch 10x10\" 10x10\" 10x10\" 10 x10\" 10\"x  10 10x10\"  10x10\" 10\"x  10   Wrist extensor eccentrics 2x15  6# 2x15  7# 2x15  7# 7# 2x10 + green TB 2x10 Green  3x10 Green  3x10  Green  3x10 Green  3x10   Hammer Sup/pro 3#  2x10 3#  2x10 4#  2x10 4# 2x10 4#  3x10 4#  3x10  4#  3x10 4#  3x10   Lat epi self-  massage                                                                                              Ther Activity                                                        Gait Training                                                        Modalities                  LASER Epi  1.5 min  15W Epi  1.5 min  15w       Epi  1.5 min  15w   Epi    1.5 min  15W - EH                                     "

## 2025-02-21 ENCOUNTER — APPOINTMENT (OUTPATIENT)
Dept: PHYSICAL THERAPY | Facility: CLINIC | Age: 45
End: 2025-02-21
Payer: COMMERCIAL

## 2025-03-03 ENCOUNTER — OFFICE VISIT (OUTPATIENT)
Dept: PHYSICAL THERAPY | Facility: CLINIC | Age: 45
End: 2025-03-03
Payer: COMMERCIAL

## 2025-03-03 DIAGNOSIS — M25.521 RIGHT ELBOW PAIN: ICD-10-CM

## 2025-03-03 DIAGNOSIS — M77.11 LATERAL EPICONDYLITIS OF RIGHT ELBOW: Primary | ICD-10-CM

## 2025-03-03 PROCEDURE — 97110 THERAPEUTIC EXERCISES: CPT | Performed by: PHYSICAL THERAPIST

## 2025-03-03 PROCEDURE — 97112 NEUROMUSCULAR REEDUCATION: CPT | Performed by: PHYSICAL THERAPIST

## 2025-03-03 NOTE — PROGRESS NOTES
Daily Note     Today's date: 3/3/2025  Patient name: Megan Richmond  : 1980  MRN: [id number]  Referring provider: Montserrat Spencer CRNP  Dx:   Encounter Diagnosis     ICD-10-CM    1. Lateral epicondylitis of right elbow  M77.11       2. Right elbow pain  M25.521                      Subjective: Patient reported that her elbow pain has been much better over the last two weeks. Noticing less pain with picking up her cup of coffee and with her stretches      Objective: See treatment diary below.      Assessment: Patient was able to tolerate progression of resistance exercises without an increase in pain. They demonstrated muscular fatigue as expected with progression. Patient continues to be progressing appropriately. Continue to progress as tolerated. Patient will continue to benefit from skilled PT in order to address impairments and improve function.         Plan: Continue per plan of care.  Progress treatment as tolerated.           Precautions: n/a  Manuals 1/13 1/20 1/24 1/27 2/7 2/10  2/14 2/18 3/3   Wrist extensor STM JF JF JF 10' EH JF       IASTM JF JF JF 8' EH JF  JF EH JF   Cupping                   Radial head mob JF   gr 3     G4 + distraction                               Neuro Re-Ed              Re-eval     Power web  Red  2x10 Green  2x10 Green  2x10             Power web finger extension Red 2x10 Red  2x10 held             Flexbar sup/pro Green  2x10 Green  2x10 Green  2x10 Green 2x10 Green  2x10 Green  2x10  Green Green  2x10 Green   2x10   Flexbar  Eccentric wrist extension Red   2x10 Red  2x10 Red  2x10 Green  2x10 Green  2x10 Green  2x10  Green  2x10 Green  2x10 Green   2x10   Low row to overhead angels Red 3x10 Red  3x10 Red  3x10     GTB  3x10  GTB  3x10 GTB  3x10 GTB  3x10   Rows BTB  3x10 Purple  3x10 Purple  3x10     Black  3x10  Black  3x10 Black  3x10 Black  3x10   Wall circles X30 cw/ccw X30 cw/ccw X30 cw/ccw     X30 cw/ccw  x30 cw/ccw     SA wall slides   Towel  2x10  "Towel  2x10     Towel  2x10  Towel  2x10 Towel  2x10 Towel  2x10    scapular clocks                Pink  x5                       Ther Ex                   UBE       2/2 2/2 3/3  held for re-eval 3 min ea  L1 3/3   Wrist extensor stretch 10x10\" 10x10\" 10x10\" 10 x10\" 10\"x  10 10x10\"  10x10\" 10\"x  10 10x10\"   Wrist extensor eccentrics 2x15  6# 2x15  7# 2x15  7# 7# 2x10 + green TB 2x10 Green  3x10 Green  3x10  Green  3x10 Green  3x10 Green  3x10   Hammer Sup/pro 3#  2x10 3#  2x10 4#  2x10 4# 2x10 4#  3x10 4#  3x10  4#  3x10 4#  3x10 4#  3x10   Lat epi self-  massage                                                                                                   Ther Activity                                                           Gait Training                                                           Modalities                   LASER Epi  1.5 min  15W Epi  1.5 min  15w       Epi  1.5 min  15w   Epi    1.5 min  15W - EH                               "

## 2025-03-05 ENCOUNTER — OFFICE VISIT (OUTPATIENT)
Dept: PODIATRY | Facility: CLINIC | Age: 45
End: 2025-03-05
Payer: COMMERCIAL

## 2025-03-05 VITALS — BODY MASS INDEX: 24.16 KG/M2 | HEIGHT: 65 IN | RESPIRATION RATE: 18 BRPM | WEIGHT: 145 LBS

## 2025-03-05 DIAGNOSIS — G57.92 NEURITIS OF LEFT FOOT: Primary | ICD-10-CM

## 2025-03-05 DIAGNOSIS — M79.672 LEFT FOOT PAIN: ICD-10-CM

## 2025-03-05 PROCEDURE — 99213 OFFICE O/P EST LOW 20 MIN: CPT | Performed by: PODIATRIST

## 2025-03-05 RX ORDER — MELOXICAM 15 MG/1
15 TABLET ORAL DAILY
Qty: 90 TABLET | Refills: 0 | Status: SHIPPED | OUTPATIENT
Start: 2025-03-05 | End: 2025-06-03

## 2025-03-05 RX ORDER — GABAPENTIN 400 MG/1
400 CAPSULE ORAL
Qty: 90 CAPSULE | Refills: 0 | Status: SHIPPED | OUTPATIENT
Start: 2025-03-05 | End: 2025-06-03

## 2025-03-05 NOTE — PROGRESS NOTES
Patient presents for assessment of left foot.  Patient has been diagnosed with neuritis of the left foot.  Cortisone injection apparently left dry erythematous skin at the first metatarsal cuneiform joint.  Stroking this area of the left foot continues to cause discomfort and burning.  Patient also relates a new symptom of stiffness.    Gabapentin 300 mg at bedtime was prescribed for this disorder.  It has helped the left foot but causes drowsiness.  It is noted that the patient has been taking ibuprofen 600 mg intermittently especially when she has her menses.  It did not help her foot.    On exam, no significant pedal disorder noted other than the skin atrophy previously noted.  Patient states that she has been exercising and using the [employer] and it seems to exacerbate her condition but it is tolerable.    Treatment: Advised her to increase gabapentin dosage to 400 mg at bedtime.  She was also placed on meloxicam 15 mg daily and is told to discontinue ibuprofen.  Reappoint 2 months.

## 2025-03-07 ENCOUNTER — OFFICE VISIT (OUTPATIENT)
Dept: PHYSICAL THERAPY | Facility: CLINIC | Age: 45
End: 2025-03-07
Payer: COMMERCIAL

## 2025-03-07 DIAGNOSIS — M77.11 LATERAL EPICONDYLITIS OF RIGHT ELBOW: Primary | ICD-10-CM

## 2025-03-07 DIAGNOSIS — M25.521 RIGHT ELBOW PAIN: ICD-10-CM

## 2025-03-07 PROCEDURE — 97112 NEUROMUSCULAR REEDUCATION: CPT | Performed by: PHYSICAL THERAPIST

## 2025-03-07 PROCEDURE — 97110 THERAPEUTIC EXERCISES: CPT | Performed by: PHYSICAL THERAPIST

## 2025-03-07 NOTE — PROGRESS NOTES
Daily Note     Today's date: 3/7/2025  Patient name: Megan Richmond  : 1980  MRN: [id number]  Referring provider: Montserrat Spencer CRNP  Dx:   Encounter Diagnosis     ICD-10-CM    1. Lateral epicondylitis of right elbow  M77.11       2. Right elbow pain  M25.521                        Subjective: Patient reports that she has been waking up a little sore in the R elbow over the last few days.       Objective: See treatment diary below.      Assessment:Patient was able to tolerate progression of resistance exercises for scapular strength without an increase in pain. They demonstrated muscular fatigue as expected with progression.   Patient continues to be progressing appropriately. Continue to progress as tolerated. Patient will continue to benefit from skilled PT in order to address impairments and improve function.         Plan: Continue per plan of care.  Progress treatment as tolerated.           Precautions: n/a  Manuals  2/7 2/10  2/14 2/18 3/3 3/7   Wrist extensor STM JF JF JF 10' EH JF        IASTM JF JF JF 8' EH JF  JF EH JF JF   Cupping                    Radial head mob JF   gr 3     G4 + distraction                                 Neuro Re-Ed              Re-eval      Power web  Red  2x10 Green  2x10 Green  2x10              Power web finger extension Red 2x10 Red  2x10 held              Flexbar sup/pro Green  2x10 Green  2x10 Green  2x10 Green 2x10 Green  2x10 Green  2x10  Green Green  2x10 Green   2x10 Green  2x10   Flexbar  Eccentric wrist extension Red   2x10 Red  2x10 Red  2x10 Green  2x10 Green  2x10 Green  2x10  Green  2x10 Green  2x10 Green   2x10 Green  2x10   Low row to overhead angels Red 3x10 Red  3x10 Red  3x10     GTB  3x10  GTB  3x10 GTB  3x10 GTB  3x10 BTB  3x10   Rows BTB  3x10 Purple  3x10 Purple  3x10     Black  3x10  Black  3x10 Black  3x10 Black  3x10 Black  3x10   Wall circles X30 cw/ccw X30 cw/ccw X30 cw/ccw     X30 cw/ccw  x30 cw/ccw      SA wall slides  "  Towel  2x10 Towel  2x10     Towel  2x10  Towel  2x10 Towel  2x10 Towel  2x10 Towel  2x10    scapular clocks                Pink  x5 Pink  x5                        Ther Ex                    UBE       2/2 2/2 3/3  held for re-eval 3 min ea  L1 3/3 3/3   Wrist extensor stretch 10x10\" 10x10\" 10x10\" 10 x10\" 10\"x  10 10x10\"  10x10\" 10\"x  10 10x10\" 10x10\"   Wrist extensor eccentrics 2x15  6# 2x15  7# 2x15  7# 7# 2x10 + green TB 2x10 Green  3x10 Green  3x10  Green  3x10 Green  3x10 Green  3x10 Green  3x10   Hammer Sup/pro 3#  2x10 3#  2x10 4#  2x10 4# 2x10 4#  3x10 4#  3x10  4#  3x10 4#  3x10 4#  3x10 4#  3x10   Lat epi self-  massage                                                                                                        Ther Activity                                                              Gait Training                                                              Modalities                    LASER Epi  1.5 min  15W Epi  1.5 min  15w       Epi  1.5 min  15w   Epi    1.5 min  15W - EH                                 "

## 2025-03-10 ENCOUNTER — OFFICE VISIT (OUTPATIENT)
Dept: PHYSICAL THERAPY | Facility: CLINIC | Age: 45
End: 2025-03-10
Payer: COMMERCIAL

## 2025-03-10 DIAGNOSIS — M25.521 RIGHT ELBOW PAIN: ICD-10-CM

## 2025-03-10 DIAGNOSIS — M77.11 LATERAL EPICONDYLITIS OF RIGHT ELBOW: Primary | ICD-10-CM

## 2025-03-10 PROCEDURE — 97110 THERAPEUTIC EXERCISES: CPT | Performed by: PHYSICAL THERAPIST

## 2025-03-10 PROCEDURE — 97112 NEUROMUSCULAR REEDUCATION: CPT | Performed by: PHYSICAL THERAPIST

## 2025-03-10 NOTE — PROGRESS NOTES
"Daily Note     Today's date: 3/10/2025  Patient name: Megan Richmond  : 1980  MRN: [id number]  Referring provider: Montserrat Spencer CRNP  Dx:   Encounter Diagnosis     ICD-10-CM    1. Lateral epicondylitis of right elbow  M77.11       2. Right elbow pain  M25.521                        Subjective: Patient reports that she has been waking up a little sore in the R elbow over the last few days.       Objective: See treatment diary below.      Assessment:Patient was able to tolerate progression of resistance exercises for scapular strength without an increase in pain. They demonstrated muscular fatigue as expected with progression.   Patient continues to be progressing appropriately. Continue to progress as tolerated. Patient will continue to benefit from skilled PT in order to address impairments and improve function.         Plan: Continue per plan of care.  Progress treatment as tolerated.           Precautions: n/a  Manuals 2/7 2/10  2/14 2/18 3/3 3/7 3/10   Wrist extensor STM EH JF         IASTM EH JF  JF EH JF JF JF   Cupping             Radial head mob                           Neuro Re-Ed      Re-eval       Power web              Power web finger extension             Flexbar sup/pro Green  2x10 Green  2x10  Green Green  2x10 Green   2x10 Green  2x10 Green  2x10   Flexbar  Eccentric wrist extension Green  2x10 Green  2x10  Green  2x10 Green  2x10 Green   2x10 Green  2x10 Green  2x10   Low row to overhead angels   GTB  3x10  GTB  3x10 GTB  3x10 GTB  3x10 BTB  3x10 BTB  3x10   Rows   Black  3x10  Black  3x10 Black  3x10 Black  3x10 Black  3x10 Black & GTB  3x10   Wall circles   X30 cw/ccw  x30 cw/ccw       SA wall slides   Towel  2x10  Towel  2x10 Towel  2x10 Towel  2x10 Towel  2x10 Towel  2x10    scapular clocks        Pink  x5 Pink  x5 Pink  x5                 Ther Ex             UBE 2/2 3/3  held for re-eval 3 min ea  L1 3/3 33 3/3   Wrist extensor stretch 10\"x  10 10x10\"  10x10\" 10\"x  10 10x10\" " "10x10\" 10x10\"   Wrist extensor eccentrics Green  3x10 Green  3x10  Green  3x10 Green  3x10 Green  3x10 Green  3x10 Green  3x10   Hammer Sup/pro 4#  3x10 4#  3x10  4#  3x10 4#  3x10 4#  3x10 4#  3x10 4#  3x10   Lat epi self-  massage                                                                     Ther Activity                                         Gait Training                                         Modalities             LASER   Epi  1.5 min  15w   Epi    1.5 min  15W -                            "

## 2025-03-13 ENCOUNTER — OFFICE VISIT (OUTPATIENT)
Dept: PHYSICAL THERAPY | Facility: CLINIC | Age: 45
End: 2025-03-13
Payer: COMMERCIAL

## 2025-03-13 DIAGNOSIS — M25.521 RIGHT ELBOW PAIN: ICD-10-CM

## 2025-03-13 DIAGNOSIS — M77.11 LATERAL EPICONDYLITIS OF RIGHT ELBOW: Primary | ICD-10-CM

## 2025-03-13 PROCEDURE — 97110 THERAPEUTIC EXERCISES: CPT

## 2025-03-13 PROCEDURE — 97140 MANUAL THERAPY 1/> REGIONS: CPT

## 2025-03-13 PROCEDURE — 97112 NEUROMUSCULAR REEDUCATION: CPT

## 2025-03-13 NOTE — PROGRESS NOTES
Daily Note     Today's date: 3/13/2025  Patient name: Megan Richmond  : 1980  MRN: [id number]  Referring provider: Montserrat Spencer CRNP  Dx:   Encounter Diagnosis     ICD-10-CM    1. Lateral epicondylitis of right elbow  M77.11       2. Right elbow pain  M25.521                        Subjective: Patient feels that over the last few weeks she has seen some good improvement in R elbow pain. Recent workout with 10# dumbbell performing upright row elicited some pain in elbow.      Objective: See treatment diary below.      Assessment: Noted reduction in tenderness and improvement in restrictions through R wrist extensors, including improvement seen in flexibility/wrist mobility. Good form throughout strengthening program with fatigue as expected. Will continue to work on improving both proximal and distal strength to help with reducing stress on elbow with daily tasks and functions.      Plan: Continue per plan of care.  Progress treatment as tolerated.           Precautions: n/a  Manuals 2/7 2/10  2/14 2/18 3/3 3/7 3/10 3/13   Wrist extensor STM EH JF          IASTM EH JF  JF EH JF JF JF    Cupping              Radial head mob                             Neuro Re-Ed      Re-eval        Power web               Power web finger extension              Flexbar sup/pro Green  2x10 Green  2x10  Green Green  2x10 Green   2x10 Green  2x10 Green  2x10 Green  2x10   Flexbar  Eccentric wrist extension Green  2x10 Green  2x10  Green  2x10 Green  2x10 Green   2x10 Green  2x10 Green  2x10 Green  2x10   Low row to overhead angels   GTB  3x10  GTB  3x10 GTB  3x10 GTB  3x10 BTB  3x10 BTB  3x10 Blue  3x10   Rows   Black  3x10  Black  3x10 Black  3x10 Black  3x10 Black  3x10 Black & GTB  3x10 Black and green  3x10   Wall circles   X30 cw/ccw  x30 cw/ccw        SA wall slides   Towel  2x10  Towel  2x10 Towel  2x10 Towel  2x10 Towel  2x10 Towel  2x10 Towel  2x10    scapular clocks        Pink  x5 Pink  x5 Pink  x5 Pink  x5      "             Ther Ex              UBE 2/2 3/3  held for re-eval 3 min ea  L1 3/3 3/3 3/3 3 min ea  L1   Wrist extensor stretch 10\"x  10 10x10\"  10x10\" 10\"x  10 10x10\" 10x10\" 10x10\" 10\"x  10   Wrist extensor eccentrics Green  3x10 Green  3x10  Green  3x10 Green  3x10 Green  3x10 Green  3x10 Green  3x10 Green  3x10   Hammer Sup/pro 4#  3x10 4#  3x10  4#  3x10 4#  3x10 4#  3x10 4#  3x10 4#  3x10 4#  3x10   Lat epi self-  massage                                                                          Ther Activity                                            Gait Training                                            Modalities              LASER   Epi  1.5 min  15w   Epi    1.5 min  15W - EH                           "

## 2025-03-17 ENCOUNTER — OFFICE VISIT (OUTPATIENT)
Dept: PHYSICAL THERAPY | Facility: CLINIC | Age: 45
End: 2025-03-17
Payer: COMMERCIAL

## 2025-03-17 DIAGNOSIS — M77.11 LATERAL EPICONDYLITIS OF RIGHT ELBOW: Primary | ICD-10-CM

## 2025-03-17 DIAGNOSIS — M25.521 RIGHT ELBOW PAIN: ICD-10-CM

## 2025-03-17 PROCEDURE — 97110 THERAPEUTIC EXERCISES: CPT

## 2025-03-17 PROCEDURE — 97112 NEUROMUSCULAR REEDUCATION: CPT

## 2025-03-17 PROCEDURE — 97140 MANUAL THERAPY 1/> REGIONS: CPT

## 2025-03-17 NOTE — PROGRESS NOTES
Daily Note     Today's date: 3/17/2025  Patient name: Megan Richmond  : 1980  MRN: [id number]  Referring provider: Montserrat Spencer CRNP  Dx:   Encounter Diagnosis     ICD-10-CM    1. Lateral epicondylitis of right elbow  M77.11       2. Right elbow pain  M25.521                        Subjective: Patient reported still having difficulty with lifting coffee cup (which she noticed more over the last few days). Hard for her to pinpoint specific activity that may have contributed to this. Continues to complete circuit workout, modifying if needed.      Objective: See treatment diary below.      Assessment: Still with restrictions through R proximal wrist extensors which is likely correlating to presence of symptoms today. Continues to be progressing well with elbow and wrist strengthening with improvement seen in eccentric control. No increasing symptoms noted throughout session. Will continue to work on improving both proximal and distal strength in R UE to help with reducing stress on elbow with daily tasks and functions.      Plan: Continue per plan of care.  Progress treatment as tolerated.           Precautions: n/a    Manuals 2/7 2/10  2/14 2/18 3/3 3/7 3/10 3/13 3/17   Wrist extensor STM EH JF           IASTM EH JF  JF  JF JF [facility]   Cupping               Radial head mob                               Neuro Re-Ed      Re-eval         Power web                Power web finger extension               Flexbar sup/pro Green  2x10 Green  2x10  Green Green  2x10 Green   2x10 Green  2x10 Green  2x10 Green  2x10 Green  2x10   Flexbar  Eccentric wrist extension Green  2x10 Green  2x10  Green  2x10 Green  2x10 Green   2x10 Green  2x10 Green  2x10 Green  2x10 Green  2x10   Low row to overhead angels   GTB  3x10  GTB  3x10 GTB  3x10 GTB  3x10 BTB  3x10 BTB  3x10 Blue  3x10 Blue  3x10   Rows   Black  3x10  Black  3x10 Black  3x10 Black  3x10 Black  3x10 Black & GTB  3x10 Black and green  3x10 Black and  "green  3x10   Wall circles   X30 cw/ccw  x30 cw/ccw         SA wall slides   Towel  2x10  Towel  2x10 Towel  2x10 Towel  2x10 Towel  2x10 Towel  2x10 Towel  2x10 Towel  2x10   scapular clocks        Pink  x5 Pink  x5 Pink  x5 Pink  x5 Pink  x5                   Ther Ex               UBE 2/2 3/3  held for re-eval 3 min ea  L1 3/3 3/3 3/3 3 min ea  L1 3 min ea  L1   Wrist extensor stretch 10\"x  10 10x10\"  10x10\" 10\"x  10 10x10\" 10x10\" 10x10\" 10\"x  10 10\"x  10   Wrist extensor eccentrics Green  3x10 Green  3x10  Green  3x10 Green  3x10 Green  3x10 Green  3x10 Green  3x10 Green  3x10 Green  3x10   Hammer Sup/pro 4#  3x10 4#  3x10  4#  3x10 4#  3x10 4#  3x10 4#  3x10 4#  3x10 4#  3x10 4#  3x10     Lat epi self-  massage                                                                               Ther Activity                                               Gait Training                                               Modalities               LASER   Epi  1.5 min  15w   Epi    1.5 min  15W - EH                             "

## 2025-03-21 ENCOUNTER — EVALUATION (OUTPATIENT)
Dept: PHYSICAL THERAPY | Facility: CLINIC | Age: 45
End: 2025-03-21
Payer: COMMERCIAL

## 2025-03-21 DIAGNOSIS — M25.521 RIGHT ELBOW PAIN: ICD-10-CM

## 2025-03-21 DIAGNOSIS — M77.11 LATERAL EPICONDYLITIS OF RIGHT ELBOW: Primary | ICD-10-CM

## 2025-03-21 PROCEDURE — 97110 THERAPEUTIC EXERCISES: CPT | Performed by: PHYSICAL THERAPIST

## 2025-03-21 PROCEDURE — 97140 MANUAL THERAPY 1/> REGIONS: CPT | Performed by: PHYSICAL THERAPIST

## 2025-03-21 NOTE — PROGRESS NOTES
PT Discharge    Today's date: 3/21/2025  Patient name: Megan Richmond  : 1980  MRN: [id number]  Referring provider: Montserrat Spencer CRNP  Dx:   Encounter Diagnosis     ICD-10-CM    1. Right elbow pain  M25.521       2. Lateral epicondylitis of right elbow  M77.11 Ambulatory Referral to Physical Therapy          Assessment/Plan  Patient is a 44 y.o. female presenting to OP PT with R elbow pain. They have attended 17 visits over 11 weeks. Since starting therapy pt has made the following progress towards goals:  1) Pain: Patient reports that their max pain frequency has decreased by about 75%. Max pain decreased from 8/10 to 5/10  2) Strength: Patient's wrist strength has improved to WNL.  3.) : Patient's  strength  has improved to WNL without less pain     Megan Richmond has been compliant with attending PT and home exercise program since initial eval. she has made improvements in objective data and achieved desired functional goals.  Patient reports having returned to their prior level or function.  It was mutually agreed to discharge to home exercise program at this time.  Patient has good understanding of provided home exercise program and was instructed to call with any questions or if issues should arise.       See updated goals below.         Goals  STG (3 weeks)  Pain: Patient will subjectively report maximum pain decreased by 2/10- MET  Strength: Patient's will demonstrate R  strength improved by 5 lbs- MET  ROM: Patient will increase R wrist  ROM by 25% without pain- MET  Function: Patient increase score on FOTO by 10 points- MET    LTG (6 weeks)  Pain: Patient will subjectively report less than 2/10 pain with functional activities.- progress made  Strength: Patient's will demonstrate R  strength improved by 10 lb- MET  ROM: Patient will increase R wrist ROM to at least 50% without pain- MET  Function: Patient will increase score on FOTO to predicted value or better- MET  Patient will be  "ind with HEP by DC    Plan  Plan details: DC to home with HEP  Treatment plan discussed with: patient    Subjective  UPDATE 3/21/2025: Patient reports that her elbow has really \"turned the corner\" over the last few weeks. She notes that she no longer has pain with raising a coffee cup. Dressing also going better. Patient states that she pushed herself this week with her exercises and it went well. Overall, she feels 80% better. She is confident that she can continue to improve with her HEP.     UPDATE 2/14/2025: Patient reports that her  feels stronger. She feels that her elbow feels better with most activities. Dressing, undressing, picking up coffee cup continue to be activities that bother her elbow the most. She feels that her elbow is 50% better overall.     IE (1/3/2025): Patient is a 44 y.o. female who presents for an initial outpatient physical therapy consultation regarding their R elbow pain. Patient reports that her pain began in July, after been on a vacation that she did some paddle boarding. She states that she also took up pickleball during that time. She was hoping the pain with go away on its on, but it hasn't. It is steadily worsening. She has been attempting to rest from exercise to help. Patient notes that lifting her 6 year old son has also gotten painful.     Aggravating factors: repetitive elbow flexion or forearm supination, reaching up, opening jars  Alleviating factors: rest, meds  Functional limitations: ADLs, work, recreations    Pain  Best: 0/10  Worst: 5/10 (was 8/10), happening less frequently.   Irritability: minutes  Location: R elbow  Quality: burning, sharp  Progression: improving    Social Support  Employment status: RN admin, currently in between jobs.   Hobbies/Recreational Activities: pickleball, home gym    Diagnostic Tests  None to date     Patient Goals for Therapy  \"Get stronger, play  with kids without pain\"    Objective  Palpation:   (+) TTP common wrist extensor " "tendon  3/21/2025 : Now (-)      Strength:  Elbow flexion 5/5 (was 4+/5)  Elbow extension 5/5 (was 4+/5)  Wrist flexion 5/5  Wrist extension 5/5  (Was 4/5, P!)  Supination 5/5 (was 4/5)  Pronation 5/5 (was 4/5)  Extended elbow : R 72 lb (15 lb before pain), L 62 lb  Flexed elbow : R 70 lb (was 65 lb (40 lb before pain), L 70 lb      Tests:  Cozen's: (+),  now (-)  Mill's: (+), now (-)  Maudley's (-)  Radial tinel's (-)         Precautions: n/a      Manuals 2/7 2/10  2/14 2/18 3/3 3/7 3/10 3/13 3/17 3/21   Wrist extensor STM EH JF               Re-eval   IASTM EH JF  JF  JF JF JF  EH    Cupping                      Radial head mob                                             Neuro Re-Ed      Re-eval                Power web                       Power web finger extension                      Flexbar sup/pro Green  2x10 Green  2x10  Green Green  2x10 Green   2x10 Green  2x10 Green  2x10 Green  2x10 Green  2x10    Flexbar  Eccentric wrist extension Green  2x10 Green  2x10  Green  2x10 Green  2x10 Green   2x10 Green  2x10 Green  2x10 Green  2x10 Green  2x10    Low row to overhead angels   GTB  3x10  GTB  3x10 GTB  3x10 GTB  3x10 BTB  3x10 BTB  3x10 Blue  3x10 Blue  3x10    Rows   Black  3x10  Black  3x10 Black  3x10 Black  3x10 Black  3x10 Black & GTB  3x10 Black and green  3x10 Black and green  3x10    Wall circles   X30 cw/ccw  x30 cw/ccw                SA wall slides   Towel  2x10  Towel  2x10 Towel  2x10 Towel  2x10 Towel  2x10 Towel  2x10 Towel  2x10 Towel  2x10    scapular clocks         Pink  x5 Pink  x5 Pink  x5 Pink  x5 Pink  x5                           Ther Ex                      UBE 2/2 3/3  held for re-eval 3 min ea  L1 3/3 3/3 3/3 3 min ea  L1 3 min ea  L1    Wrist extensor stretch 10\"x  10 10x10\"  10x10\" 10\"x  10 10x10\" 10x10\" 10x10\" 10\"x  10 10\"x  10    Wrist extensor eccentrics Green  3x10 Green  3x10  Green  3x10 Green  3x10 Green  3x10 Green  3x10 Green  3x10 Green  3x10 Green  3x10  "   Hammer Sup/pro 4#  3x10 4#  3x10  4#  3x10 4#  3x10 4#  3x10 4#  3x10 4#  3x10 4#  3x10 4#  3x10      Lat epi self-  massage                                                                                                                  Ther Activity                                                                    Gait Training                                                                    Modalities                      LASER   Epi  1.5 min  15w   Epi     1.5 min  15W - EH

## 2025-03-28 ENCOUNTER — TELEPHONE (OUTPATIENT)
Dept: OBGYN CLINIC | Facility: CLINIC | Age: 45
End: 2025-03-28

## 2025-03-28 DIAGNOSIS — M79.672 LEFT FOOT PAIN: Primary | ICD-10-CM

## 2025-03-28 DIAGNOSIS — G57.92 NEURITIS OF LEFT FOOT: ICD-10-CM

## 2025-03-28 DIAGNOSIS — M79.675 PAIN OF TOE OF LEFT FOOT: ICD-10-CM

## 2025-03-28 RX ORDER — NAPROXEN 500 MG/1
500 TABLET ORAL 2 TIMES DAILY WITH MEALS
Qty: 60 TABLET | Refills: 0 | Status: SHIPPED | OUTPATIENT
Start: 2025-03-28 | End: 2025-04-27

## 2025-03-28 NOTE — TELEPHONE ENCOUNTER
Patient called the RX Refill Line. Message is being forwarded to the office.     Patient is requesting refill on Tranexamic Acid 650 MG take 2 tablets three time a day for 5 days not on active medication list   [address] [address]  Phone: [phone]  Fax: [phone]     Please contact patient a once refilled to arrange  [phone]

## 2025-04-03 ENCOUNTER — PATIENT MESSAGE (OUTPATIENT)
Dept: UROLOGY | Facility: MEDICAL CENTER | Age: 45
End: 2025-04-03

## 2025-04-04 DIAGNOSIS — N32.89 BLADDER SPASM: Primary | ICD-10-CM

## 2025-04-04 DIAGNOSIS — N30.90 CYSTITIS: ICD-10-CM

## 2025-04-04 DIAGNOSIS — R39.14 FEELING OF INCOMPLETE BLADDER EMPTYING: ICD-10-CM

## 2025-04-04 RX ORDER — MIRABEGRON 50 MG/1
50 TABLET, FILM COATED, EXTENDED RELEASE ORAL DAILY
Qty: 90 TABLET | Refills: 1 | Status: SHIPPED | OUTPATIENT
Start: 2025-04-04

## 2025-04-15 ENCOUNTER — OFFICE VISIT (OUTPATIENT)
Dept: FAMILY MEDICINE CLINIC | Facility: CLINIC | Age: 45
End: 2025-04-15
Payer: COMMERCIAL

## 2025-04-15 ENCOUNTER — APPOINTMENT (OUTPATIENT)
Dept: LAB | Facility: CLINIC | Age: 45
End: 2025-04-15
Payer: COMMERCIAL

## 2025-04-15 VITALS
SYSTOLIC BLOOD PRESSURE: 112 MMHG | WEIGHT: 148.8 LBS | HEART RATE: 76 BPM | TEMPERATURE: 97.7 F | HEIGHT: 66 IN | BODY MASS INDEX: 23.91 KG/M2 | OXYGEN SATURATION: 99 % | DIASTOLIC BLOOD PRESSURE: 74 MMHG

## 2025-04-15 DIAGNOSIS — R76.8 ELEVATED ANTINUCLEAR ANTIBODY (ANA) LEVEL: ICD-10-CM

## 2025-04-15 DIAGNOSIS — M35.00 SICCA SYNDROME (HCC): ICD-10-CM

## 2025-04-15 DIAGNOSIS — M25.50 MULTIPLE JOINT PAIN: ICD-10-CM

## 2025-04-15 DIAGNOSIS — R07.0 THROAT PAIN: ICD-10-CM

## 2025-04-15 DIAGNOSIS — M54.2 NECK PAIN: ICD-10-CM

## 2025-04-15 DIAGNOSIS — R07.0 THROAT PAIN: Primary | ICD-10-CM

## 2025-04-15 PROBLEM — R73.09 ABNORMAL GTT (GLUCOSE TOLERANCE TEST): Status: ACTIVE | Noted: 2018-02-07

## 2025-04-15 LAB
ALBUMIN SERPL BCG-MCNC: 4.5 G/DL (ref 3.5–5)
ALP SERPL-CCNC: 33 U/L (ref 34–104)
ALT SERPL W P-5'-P-CCNC: 15 U/L (ref 7–52)
ANION GAP SERPL CALCULATED.3IONS-SCNC: 7 MMOL/L (ref 4–13)
AST SERPL W P-5'-P-CCNC: 23 U/L (ref 13–39)
BASOPHILS # BLD AUTO: 0.03 THOUSANDS/ÂΜL (ref 0–0.1)
BASOPHILS NFR BLD AUTO: 0 % (ref 0–1)
BILIRUB SERPL-MCNC: 0.53 MG/DL (ref 0.2–1)
BUN SERPL-MCNC: 14 MG/DL (ref 5–25)
CALCIUM SERPL-MCNC: 9.3 MG/DL (ref 8.4–10.2)
CHLORIDE SERPL-SCNC: 103 MMOL/L (ref 96–108)
CO2 SERPL-SCNC: 27 MMOL/L (ref 21–32)
CREAT SERPL-MCNC: 0.66 MG/DL (ref 0.6–1.3)
CRP SERPL QL: 1 MG/L
EOSINOPHIL # BLD AUTO: 0.07 THOUSAND/ÂΜL (ref 0–0.61)
EOSINOPHIL NFR BLD AUTO: 1 % (ref 0–6)
ERYTHROCYTE [DISTWIDTH] IN BLOOD BY AUTOMATED COUNT: 13.2 % (ref 11.6–15.1)
ERYTHROCYTE [SEDIMENTATION RATE] IN BLOOD: 8 MM/HOUR (ref 0–19)
GFR SERPL CREATININE-BSD FRML MDRD: 109 ML/MIN/1.73SQ M
GLUCOSE P FAST SERPL-MCNC: 95 MG/DL (ref 65–99)
HCT VFR BLD AUTO: 38.9 % (ref 34.8–46.1)
HGB BLD-MCNC: 12.8 G/DL (ref 11.5–15.4)
IMM GRANULOCYTES # BLD AUTO: 0.02 THOUSAND/UL (ref 0–0.2)
IMM GRANULOCYTES NFR BLD AUTO: 0 % (ref 0–2)
LYMPHOCYTES # BLD AUTO: 1.86 THOUSANDS/ÂΜL (ref 0.6–4.47)
LYMPHOCYTES NFR BLD AUTO: 21 % (ref 14–44)
MCH RBC QN AUTO: 30.6 PG (ref 26.8–34.3)
MCHC RBC AUTO-ENTMCNC: 32.9 G/DL (ref 31.4–37.4)
MCV RBC AUTO: 93 FL (ref 82–98)
MONOCYTES # BLD AUTO: 0.48 THOUSAND/ÂΜL (ref 0.17–1.22)
MONOCYTES NFR BLD AUTO: 6 % (ref 4–12)
NEUTROPHILS # BLD AUTO: 6.34 THOUSANDS/ÂΜL (ref 1.85–7.62)
NEUTS SEG NFR BLD AUTO: 72 % (ref 43–75)
NRBC BLD AUTO-RTO: 0 /100 WBCS
PLATELET # BLD AUTO: 222 THOUSANDS/UL (ref 149–390)
PMV BLD AUTO: 10.9 FL (ref 8.9–12.7)
POTASSIUM SERPL-SCNC: 4.1 MMOL/L (ref 3.5–5.3)
PROT SERPL-MCNC: 6.7 G/DL (ref 6.4–8.4)
RBC # BLD AUTO: 4.18 MILLION/UL (ref 3.81–5.12)
RHEUMATOID FACT SERPL-ACNC: <10 IU/ML
S PYO AG THROAT QL: NEGATIVE
SODIUM SERPL-SCNC: 137 MMOL/L (ref 135–147)
TSH SERPL DL<=0.05 MIU/L-ACNC: 2.05 UIU/ML (ref 0.45–4.5)
URATE SERPL-MCNC: 2.5 MG/DL (ref 2–7.5)
WBC # BLD AUTO: 8.8 THOUSAND/UL (ref 4.31–10.16)

## 2025-04-15 PROCEDURE — 99214 OFFICE O/P EST MOD 30 MIN: CPT | Performed by: FAMILY MEDICINE

## 2025-04-15 PROCEDURE — 85025 COMPLETE CBC W/AUTO DIFF WBC: CPT

## 2025-04-15 PROCEDURE — 87880 STREP A ASSAY W/OPTIC: CPT | Performed by: FAMILY MEDICINE

## 2025-04-15 PROCEDURE — 36415 COLL VENOUS BLD VENIPUNCTURE: CPT

## 2025-04-15 PROCEDURE — 86038 ANTINUCLEAR ANTIBODIES: CPT

## 2025-04-15 PROCEDURE — 86800 THYROGLOBULIN ANTIBODY: CPT

## 2025-04-15 PROCEDURE — 86431 RHEUMATOID FACTOR QUANT: CPT

## 2025-04-15 PROCEDURE — 86618 LYME DISEASE ANTIBODY: CPT

## 2025-04-15 PROCEDURE — 84550 ASSAY OF BLOOD/URIC ACID: CPT

## 2025-04-15 PROCEDURE — 86140 C-REACTIVE PROTEIN: CPT

## 2025-04-15 PROCEDURE — 85652 RBC SED RATE AUTOMATED: CPT

## 2025-04-15 PROCEDURE — 86200 CCP ANTIBODY: CPT

## 2025-04-15 PROCEDURE — 86225 DNA ANTIBODY NATIVE: CPT

## 2025-04-15 PROCEDURE — 86235 NUCLEAR ANTIGEN ANTIBODY: CPT

## 2025-04-15 PROCEDURE — 80053 COMPREHEN METABOLIC PANEL: CPT

## 2025-04-15 PROCEDURE — 84443 ASSAY THYROID STIM HORMONE: CPT

## 2025-04-15 PROCEDURE — 87070 CULTURE OTHR SPECIMN AEROBIC: CPT | Performed by: FAMILY MEDICINE

## 2025-04-15 PROCEDURE — 86376 MICROSOMAL ANTIBODY EACH: CPT

## 2025-04-15 NOTE — PROGRESS NOTES
Name: Megan Richmond      : 1980      MRN: [id number]  Encounter Provider: Phyllis Rock DO  Encounter Date: 4/15/2025   Encounter department: [facility]  :  Assessment & Plan  Throat pain  Strep screen negative.  Orders:  •  CBC and differential; Future  •  TSH, 3rd generation with Free T4 reflex; Future  •  Thyroid Antibodies Panel; Future  •  JANAK Screen w/Reflex Cascade; Future  •  POCT rapid ANTIGEN strepA  •  Throat culture    Neck pain  Will check xray.  Orders:  •  CBC and differential; Future  •  TSH, 3rd generation with Free T4 reflex; Future  •  Thyroid Antibodies Panel; Future  •  JANAK Screen w/Reflex Cascade; Future  •  XR spine cervical complete 4 or 5 vw non injury; Future  •  tiZANidine (ZANAFLEX) 4 mg tablet; Take 1 tablet (4 mg total) by mouth daily at bedtime    Elevated antinuclear antibody (JANAK) level    Orders:  •  CBC and differential; Future  •  TSH, 3rd generation with Free T4 reflex; Future  •  Thyroid Antibodies Panel; Future  •  JANAK Screen w/Reflex Cascade; Future    Multiple joint pain    Orders:  •  CBC and Platelet; Future  •  Comprehensive Metabolic Panel; Future  •  RHEUMATOID FACTOR; Future  •  Cyclic citrul peptide antibody, IgG; Future  •  Sjogren's Antibodies; Future  •  Uric acid; Future  •  Lyme Total AB W Reflex to IGM/IGG; Future  •  C-reactive protein; Future  •  Sedimentation rate, automated; Future  •  CBC and differential; Future  •  TSH, 3rd generation with Free T4 reflex; Future  •  Thyroid Antibodies Panel; Future  •  JANAK Screen w/Reflex Cascade; Future    Sicca syndrome (HCC)                History of Present Illness   Patient presents with:  Nausea: The night before neck pain she was super nauseas , but then it subsided.   Neck Pain: Progressively getting worse over the last 72 hrs. Hurts to swallow and cough.   Insomnia: Started to now be able to sleep well  Cold Like Symptoms: A week ago. But pretty mild now. Some congestion started then.  "  Family Problem: Family hx of cancers another dx  pt. Is worried about  Hand Pain: Bilateral hand pain- ongoing for years.      Started with sore throat and headache - kids had been sick. Back of neck hurst when she swallows and coughs. Recalls no injury.  Denies fever or rash.  72 hours ago woke up with a stiff neck  Hand pain which she was seen for last year, has gotten worse.  Sister with multiple myeloma - age 43.  Mother had breast cancer in her 40's and then developed oropharyngeal cancer.    Nausea  Associated symptoms include arthralgias, nausea and neck pain. Pertinent negatives include no abdominal pain, chest pain, chills, congestion, coughing, fever, headaches or sore throat.   Neck Pain   Pertinent negatives include no chest pain, fever or headaches.   Hand Pain   Pertinent negatives include no chest pain.     Review of Systems   Constitutional:  Negative for chills and fever.   HENT:  Negative for congestion and sore throat.    Respiratory:  Negative for cough and chest tightness.    Cardiovascular:  Negative for chest pain and palpitations.   Gastrointestinal:  Positive for nausea. Negative for abdominal pain, constipation and diarrhea.   Genitourinary:  Negative for difficulty urinating.   Musculoskeletal:  Positive for arthralgias and neck pain.   Skin: Negative.    Neurological:  Negative for dizziness and headaches.   Psychiatric/Behavioral: Negative.         Objective   /74 (BP Location: Left arm, Patient Position: Sitting, Cuff Size: Adult)   Pulse 76   Temp 97.7 °F (36.5 °C) (Temporal)   Ht 5' 5.5\" (1.664 m)   Wt 67.5 kg (148 lb 12.8 oz)   SpO2 99%   BMI 24.39 kg/m²      Physical Exam  Vitals and nursing note reviewed.   Constitutional:       General: She is not in acute distress.  HENT:      Head: Normocephalic.      Mouth/Throat:      Pharynx: Posterior oropharyngeal erythema (slight) present.   Neck:      Thyroid: No thyromegaly.   Cardiovascular:      Rate and Rhythm: Normal " rate and regular rhythm.      Heart sounds: Normal heart sounds.   Pulmonary:      Effort: Pulmonary effort is normal.      Breath sounds: Normal breath sounds.   Musculoskeletal:         General: No deformity.      Right lower leg: No edema.      Left lower leg: No edema.      Comments: Decreased range of motion of cervical spine - worse turning to the left.   Lymphadenopathy:      Cervical: No cervical adenopathy.   Skin:     General: Skin is warm and dry.   Neurological:      Mental Status: She is alert and oriented to person, place, and time.   Psychiatric:         Mood and Affect: Mood normal.

## 2025-04-16 DIAGNOSIS — M54.2 NECK PAIN: Primary | ICD-10-CM

## 2025-04-16 LAB
B BURGDOR IGG+IGM SER QL IA: NEGATIVE
CCP AB SER IA-ACNC: 1.3 (ref ?–10)
DSDNA IGG SERPL IA-ACNC: <0.9 IU/ML (ref ?–15)
ENA SS-A AB SER IA-ACNC: 0.6 U/ML (ref ?–10)
ENA SS-B IGG SER IA-ACNC: <0.6 U/ML (ref ?–10)
NUCLEAR IGG SER IA-RTO: 3.6 RATIO (ref ?–1)
THYROGLOB AB SERPL-ACNC: <1 IU/ML (ref 0–0.9)
THYROPEROXIDASE AB SERPL-ACNC: 16 IU/ML (ref 0–34)

## 2025-04-16 RX ORDER — PREDNISONE 10 MG/1
TABLET ORAL
Qty: 30 TABLET | Refills: 0 | Status: SHIPPED | OUTPATIENT
Start: 2025-04-16

## 2025-04-17 LAB
BACTERIA THROAT CULT: NORMAL
CENTROMERE B AB SER-ACNC: 58 U/ML (ref ?–10)
ENA JO1 AB SER IA-ACNC: <0.5 U/ML (ref ?–10)
ENA SCL70 IGG SER IA-ACNC: <0.6 U/ML (ref ?–10)
ENA SM IGG SER IA-ACNC: <0.8 U/ML (ref ?–10)
ENA SS-A AB SER IA-ACNC: 0.5 U/ML (ref ?–10)
ENA SS-B IGG SER IA-ACNC: <0.6 U/ML (ref ?–10)
U1 SNRNP IGG SER IA-ACNC: 3.1 U/ML (ref ?–10)

## 2025-04-21 ENCOUNTER — TELEPHONE (OUTPATIENT)
Dept: OBGYN CLINIC | Facility: CLINIC | Age: 45
End: 2025-04-21

## 2025-04-21 DIAGNOSIS — J30.2 SEASONAL ALLERGIES: ICD-10-CM

## 2025-04-21 NOTE — TELEPHONE ENCOUNTER
Patient called the RX Refill Line. Message is being forwarded to the office.     Patient is requesting a refill for Tranexamic Acid 650 MG TABS. Patients previous OBGYN prescribed it but has now left the practice. Patient has upcoming appointment with new provider at a new office however is currently having a very heavy flow right now and needs the medication. PCP will not fill it. Is anyone covering for Dr Anguiano or does this need to be filled by new office?     Please contact patient at [phone]

## 2025-04-22 ENCOUNTER — TELEPHONE (OUTPATIENT)
Age: 45
End: 2025-04-22

## 2025-04-22 ENCOUNTER — TELEPHONE (OUTPATIENT)
Dept: PODIATRY | Facility: CLINIC | Age: 45
End: 2025-04-22

## 2025-04-22 ENCOUNTER — PATIENT MESSAGE (OUTPATIENT)
Dept: UROLOGY | Facility: MEDICAL CENTER | Age: 45
End: 2025-04-22

## 2025-04-22 DIAGNOSIS — N92.0 MENORRHAGIA WITH REGULAR CYCLE: Primary | ICD-10-CM

## 2025-04-22 DIAGNOSIS — J30.2 SEASONAL ALLERGIES: ICD-10-CM

## 2025-04-22 LAB
ANA PAT SER IF-IMP: ABNORMAL
ANA SER QL IF: POSITIVE
ANA TITR SER HEP2 SUBST: ABNORMAL {TITER}

## 2025-04-22 RX ORDER — IPRATROPIUM BROMIDE 21 UG/1
2 SPRAY, METERED NASAL EVERY 12 HOURS
Qty: 30 ML | Refills: 0 | Status: SHIPPED | OUTPATIENT
Start: 2025-04-22

## 2025-04-22 RX ORDER — TRANEXAMIC ACID 650 MG/1
1300 TABLET ORAL 3 TIMES DAILY
Qty: 30 TABLET | Refills: 0 | Status: SHIPPED | OUTPATIENT
Start: 2025-04-22 | End: 2025-04-27

## 2025-04-22 NOTE — PATIENT COMMUNICATION
Patient calling, started the Myrbetriq 50 mg and feels no difference. Did discuss it can take a while to take effect.    Urinary frequency and constant feeling irritated in the urethra.     Taking Cystex routinely Drinks 48 ounces of water daily, 16 ounces of coffee, vegetable and fruit shakes. Discussed to increase water and decrease bladder irritants    Denies pain, fever, hematuria    Patient declines scheduling a follow up due to $50 co pay and is looking for recommendations by phone.   #[phone]

## 2025-04-22 NOTE — TELEPHONE ENCOUNTER
Spoke with patient to review results per Lupe SOL. Patient verbalizes understanding. Would like to schedule appointment to establish care with Lanie prior to her scheduled yearly in December. Request appointment with Dr. Shine. Appointment scheduled 4/24.

## 2025-04-22 NOTE — TELEPHONE ENCOUNTER
Since Nov she started with heavy unpredictable periods, she changes tampons often. This period came 2 weeks early, also has IBS. Patient states she has pelvic and back pain but patient does not feels this requires emergent care.  Pat asking for TXA refill  Patient will check with [facility] since she does have 11 refills.  Patient offered provider appointment.  Patient states she would prefer to have new provider review her med request first, will make appointment if provider feels she should be seen sooner than yearly scheduled 12/2025.   HP sent to Dr. Shine

## 2025-04-22 NOTE — TELEPHONE ENCOUNTER
Patient calls to ask about her neck pain. Patient reports that she has no pain anymore. Patient continues to taper down on her prednisone. Patient is asking if she needs to complete the x-ray? Please call the patient @ [phone]    Patient calls to ask about the results of her 4/15/25 lab work. Please review and then speak to the patient. Patient can be reached at [phone].

## 2025-04-22 NOTE — TELEPHONE ENCOUNTER
Caller: Megan Richmond    Doctor: Dr. Joon Teran    Reason for call: Megan is requesting a call from Dr. Teran.  She has two unanswered messages in her chart. One from 4/3/25 and one from 4/14/25.  She feels she needs a game plan.  She is currently taking Gabapentin 400 mg at night and it is not helping.   The Voltaren Cream did not make a difference and she still has all the symptoms that she first came in with.    Call back#: [phone]

## 2025-04-23 DIAGNOSIS — R76.8 ELEVATED ANTINUCLEAR ANTIBODY (ANA) LEVEL: Primary | ICD-10-CM

## 2025-04-24 ENCOUNTER — OFFICE VISIT (OUTPATIENT)
Dept: OBGYN CLINIC | Facility: CLINIC | Age: 45
End: 2025-04-24
Payer: COMMERCIAL

## 2025-04-24 VITALS
WEIGHT: 148 LBS | HEIGHT: 66 IN | BODY MASS INDEX: 23.78 KG/M2 | SYSTOLIC BLOOD PRESSURE: 110 MMHG | DIASTOLIC BLOOD PRESSURE: 72 MMHG

## 2025-04-24 DIAGNOSIS — N90.89 VULVAR IRRITATION: ICD-10-CM

## 2025-04-24 DIAGNOSIS — N92.0 MENORRHAGIA WITH REGULAR CYCLE: Primary | ICD-10-CM

## 2025-04-24 PROBLEM — R73.09 ABNORMAL GTT (GLUCOSE TOLERANCE TEST): Status: RESOLVED | Noted: 2018-02-07 | Resolved: 2025-04-24

## 2025-04-24 LAB
SL AMB  POCT GLUCOSE, UA: NEGATIVE
SL AMB LEUKOCYTE ESTERASE,UA: NEGATIVE
SL AMB POCT BILIRUBIN,UA: NEGATIVE
SL AMB POCT BLOOD,UA: 10
SL AMB POCT CLARITY,UA: CLEAR
SL AMB POCT COLOR,UA: YELLOW
SL AMB POCT KETONES,UA: NEGATIVE
SL AMB POCT NITRITE,UA: NEGATIVE
SL AMB POCT PH,UA: 6
SL AMB POCT SPECIFIC GRAVITY,UA: 1.01
SL AMB POCT URINE PROTEIN: NEGATIVE
SL AMB POCT UROBILINOGEN: 3.5

## 2025-04-24 PROCEDURE — 99215 OFFICE O/P EST HI 40 MIN: CPT | Performed by: OBSTETRICS & GYNECOLOGY

## 2025-04-24 PROCEDURE — 81002 URINALYSIS NONAUTO W/O SCOPE: CPT | Performed by: OBSTETRICS & GYNECOLOGY

## 2025-04-24 NOTE — PATIENT COMMUNICATION
Patient called in stating she has been sending messages in without speaking with her provider, Venancio. She is requesting a call back from Venancio specifically to discuss Myrbetriq. Please advise.

## 2025-04-24 NOTE — PROGRESS NOTES
"Name: Megan Richmond      : 1980      MRN: [id number]  Encounter Provider: Lena Shine MD  Encounter Date: 2025   Encounter department: [facility]  :  Assessment & Plan  Menorrhagia with regular cycle  Discussed different options for treatment of heavy menstrual bleeding with focus on medical management options. Options listed below:  TXA -- discussed taking Lysteda for at most 5 days each month to control bleeding profile. Reviewed use of TXA in trauma and in obstetric hemorrhage. Explained that she would start taking TXA on the first day of heavy bleeding. Most patients with predictable, regular menses start taking TXA on day 1 or 2 and may take it for 2-3 days just while their bleeding is heavy and stop once the bleeding lightens. The degree of how much the bleeding lightens is different for all patients and a 3 month trial is recommended to determine if it is effective.   Hormonal options reviewed including oral contraceptive pills, systemic progestins including depo provera, and LARC options such as Nexplanon and IUDs were reviewed. Discussed benefits of OCPs/patch/NuvaRing including manipulation of menses, lightening of menstrual flow, and decreased cramping with decreased bleeding profile. Cons of pills/ring/patch options including needing to take a daily pill, and trial and error in finding the \"right\" pill for each patient. Depo provera was reviewed as the injectable option. Discussed many patients achieve amenorrhea with Depo provera but usually after about 6 months of use, with  irregular bleeding being common in the first 6 months of use. Cons include weight gain and needing to come into the office for injection every 12 weeks.  LARC options reviewed including Nexplanon and IUDs. Discussed Nexplanon as an option as about 25% of patients will experience ovulation suppression and amenorrhea. Many patients will have irregular bleeding that " be variable and range from spotting each month to spotting every days for 2 weeks every month. A trial of 4-6 months is recommended to see what the bleeding profile will be for length of use. IUDs were reviewed including LNG-IUDs and ParaGard, the only non-hormonal option. Explained that Paragard is not recommended as about 50% of patients will experience increased bleeding and increased cramping with use. The other 50% of patients do not experience change to their bleeding profile. LNG IUDs including Mirena, Liletta, and Kyleena were reviewed. Discussed that Mirena and Liletta have the most levonorgestrol amount, allowing for approved use for 8 years for both IUDs and the most effect on bleeding profile than other IUDs. Discussed that a goal of lighter menses but a lot of patients are able to achieve amenorrhea. Kyleena IUD was reviewed as a lower progesterone amount in an IUD thus the use for only 5 years, and more patients still have cyclic bleeding occur, though lighter bleeding profile is often achieved as well.    Patient had poor experience with IUD in the past with frequent bleeding and pelvic pain. Discussed options for menstrual suppression with combined OCPs as well as remaining on TXA medication to help with bleeding management. Patient will see how she does with TXA medication now that a refill has been issued to her and let me know.   Briefly touched on her options for surgical management with endometrial ablation. Did not go over hysterectomy options for definitive treatment, but it can be discussed if medical management is no longer a valid option for patient.          Vulvar irritation  We discussed utilization of over-the-counter hydrocortisone steroid ointment to help reduce irritation and inflammation in that area.  Reviewed utilization should occur in a thin layer with use nightly for 1 week and then tapering down to utilization weekly and then only as needed.  If her irritation continues to  persist despite benign findings on her physical examination, consider prescription steroid dosing for vulvar irritation as well as vulvar biopsy.   Orders:    POCT urine dip        History of Present Illness   HPI  Megan Richmond is a 44 y.o. female who presents for transfer of care from [facility]. Megan was seeing Dr. Anguiano for gyn care for the past few years and with Dr. Anguiano's recent departure from the office, she is transferring care to [facility]. She has been taking Lysteda since December 2024 to assist with heavy menstrual bleeding.  Due to a change in providers she only took her listed on medication for 2 months noting that it reduced her bleeding profile but her back cramping and pelvic pain during her menses is still fairly severe.  Discussed her episodes of irritation near her labia and vulva as well as near her urethra.  She did have a UTI at the time that she first noticed this however her UTI was treated and her irritation continues to persist.   History obtained from: patient    Review of Systems   Constitutional:  Negative for chills, diaphoresis and fever.   Gastrointestinal:  Positive for abdominal pain. Negative for vomiting.   Genitourinary:  Positive for menstrual problem, pelvic pain and vaginal bleeding. Negative for vaginal discharge and vaginal pain.   Musculoskeletal:  Positive for back pain.   Neurological:  Negative for dizziness, weakness and headaches.     Past Medical History   Past Medical History:   Diagnosis Date    Abnormal ECG     Abnormal Pap smear of cervix     Allergic     Seasonal, skin in right nostril unhealing fissure, improved    Anxiety 3/2020    Arthritis 02/2021    Unable to open jars or use thimbs without sharp pain    Depression     Eczema     Dry skin all my life    Fibrocystic breast     IBS (irritable bowel syndrome)     Osteoarthritis 1/5/2020    Ovarian cyst     Plantar fasciitis     Skin tag 2016    Left nipple, previously  removed a couple myself with floss    Sleep difficulties     STD (sexually transmitted disease)     HPV over 10 years ago    Stomach disorder     Ibs    Urinary incontinence     Dribbles when trying to sleep through uti like symptoms at night    Urinary tract infection     15 years ago    Varicella     As a child     Past Surgical History:   Procedure Laterality Date     SECTION      x 2    CLAVICLE SURGERY Left 2001    SALPINGECTOMY Bilateral 2018    SHOULDER SURGERY      Compound clavicle fx repair    SKIN BIOPSY      Lateral left breast and back mole-negative    TUBAL LIGATION      Tubes removed wit 2018 c section     Family History   Problem Relation Age of Onset    Breast cancer Mother 40        Brca negative    Rheum arthritis Mother     Throat cancer Mother     Cancer Mother         Breast and oropharyngeal CA    Arthritis Mother         Rheumatoid arthritis    Hyperlipidemia Mother         Controlled with a statin    Rheumatologic disease Mother         RA  started    Hypertension Father     Heart disease Father     Sleep apnea Father     Depression Father         Undiagnosed specific    Anxiety disorder Father     Deep vein thrombosis Father     Rashes / Skin problems Father         Multiple precancerous spots removed    Arthritis Father         Osteo, severe    Clotting disorder Father         Xarelto for DVTs    Cancer Sister 40        Multiple myeloma diagnosed feb this year    No Known Problems Daughter     Breast cancer Maternal Grandmother 80    Cancer Maternal Grandmother         Breast CA    No Known Problems Maternal Grandfather     Stroke Paternal Grandmother     No Known Problems Paternal Grandfather     No Known Problems Maternal Aunt     No Known Problems Maternal Aunt     No Known Problems Paternal Aunt     No Known Problems Paternal Aunt     Colon cancer Other       reports that she has never smoked. She has never used smokeless tobacco. She reports current alcohol use  "of about 6.0 standard drinks of alcohol per week. She reports that she does not currently use drugs.  Current Outpatient Medications   Medication Instructions    diphenhydrAMINE (BENADRYL) 25 mg, Every 6 hours PRN    EPINEPHrine (EPIPEN) 0.3 mg, Intramuscular, Once    gabapentin (NEURONTIN) 400 mg, Oral, Daily at bedtime    hyoscyamine (LEVSIN/SL) 0.125 mg, Oral, Every 4 hours PRN    ibuprofen (MOTRIN) 200 mg tablet Every 6 hours PRN    ipratropium (ATROVENT) 0.03 % nasal spray 2 sprays, Nasal, Every 12 hours    Mirabegron ER 50 mg, Oral, Daily    Multiple Vitamin (multivitamin) capsule 1 capsule, Daily    predniSONE 10 mg tablet Take 5 tabs x 2 days with food, 4 tabs x 2 days, 3 tabs x 2 days, 2 tabs x 2 days, 1 tab x 2 days.  Avoid taking close to bedtime.    Probiotic Product (PROBIOTIC DAILY PO) 1 tablet, Daily    tiZANidine (ZANAFLEX) 4 mg, Oral, Daily at bedtime    Tranexamic Acid 1,300 mg, Oral, 3 times daily    tretinoin (RETIN-A) 0.025 % cream APPLY PEA SIZE AMOUNT TO AFFECTED AREAS 3 NIGHTS A WEEK, THEN APPLY NIGHTLY AS TOLERATED.     Allergies   Allergen Reactions    Bee Venom Swelling    Sulfamethoxazole-Trimethoprim Rash     rash         Objective   /72   Ht 5' 5.5\" (1.664 m)   Wt 67.1 kg (148 lb)   LMP 04/20/2025 (Exact Date)   BMI 24.25 kg/m²      Physical Exam  Constitutional:       General: She is not in acute distress.     Appearance: Normal appearance. She is not diaphoretic.   Genitourinary:      Vulva normal.      Right Labia: No rash, tenderness, lesions or skin changes.     Left Labia: No tenderness, lesions, skin changes or rash.     No vaginal discharge, erythema, tenderness, bleeding or ulceration.      No vaginal atrophy present.     Cervix is nulliparous.      No cervical motion tenderness or lesion.   HENT:      Head: Normocephalic and atraumatic.   Pulmonary:      Effort: Pulmonary effort is normal. No respiratory distress.   Musculoskeletal:      Right lower leg: No edema.    "   Left lower leg: No edema.   Neurological:      Mental Status: She is alert and oriented to person, place, and time.   Skin:     General: Skin is warm and dry.      Coloration: Skin is not pale.   Psychiatric:         Mood and Affect: Mood normal.         Behavior: Behavior normal.         Thought Content: Thought content normal.         Judgment: Judgment normal.   Vitals and nursing note reviewed.           Administrative Statements   I have spent a total time of 43 minutes in caring for this patient on the day of the visit/encounter including Diagnostic results, Prognosis, Risks and benefits of tx options, Instructions for management, Patient and family education, Importance of tx compliance, Risk factor reductions, Impressions, Counseling / Coordination of care, Documenting in the medical record, Reviewing/placing orders in the medical record (including tests, medications, and/or procedures), Obtaining or reviewing history  , and Communicating with other healthcare professionals .

## 2025-04-30 ENCOUNTER — TELEPHONE (OUTPATIENT)
Dept: UROLOGY | Facility: AMBULATORY SURGERY CENTER | Age: 45
End: 2025-04-30

## 2025-04-30 NOTE — TELEPHONE ENCOUNTER
I called the patient on 4/30/2025 at 4:30 PM in regards to her ongoing lower urinary tract symptoms.  Patient notes that she does continue to experience frequent urination and feelings of irritability with a sensation that she still has to use the bathroom after just going.    Patient was initially started on Myrbetriq 25 mg once daily and did not see any improvement.  This was increased to 50 mg, which she has utilized for about a month at this point.    We discussed potential pharmacotherapy, but the patient notes that she typically experiences side effects to medications and would prefer further evaluation prior to initiating other medications.    We discussed potentially scheduling a an office cystoscopy for further evaluation to determine if there are any inflammatory changes within her bladder that may be contributing to this.  The patient would like to proceed with a in office cystoscopy.  Please assist with scheduling the patient for a in office cystoscopy in 1 to 2 months for further evaluation.  Patient will continue on Myrbetriq 50 mg once daily at this time.

## 2025-04-30 NOTE — PROGRESS NOTES
Name: Megan Richmond      : 1980      MRN: [id number]  Encounter Provider: Abida Branch DO  Encounter Date: 2025   Encounter department: [facility] [address]  :  Assessment & Plan  Elevated antinuclear antibody (JANAK) level  Patient is a 44-year-old female presenting for further evaluation of a positive JANAK 1: 1280 and a positive anticentromere B antibody in the setting of arthralgias.  No prolonged morning stiffness or joint swelling, however reports constant stiffness in the hands throughout the day.  Reports occasional pain in the close and feet as well.  No active synovitis or joint tenderness on exam today.  Patient has no other features to suggest a connective tissue disease such as photosensitive rash, mucocutaneous manifestations, sicca symptoms, Raynaud's phenomenon, cytopenias, hematuria, respiratory symptoms.  UA normal.  Less likely scleroderma currently given no Raynaud's phenomenon.  Given positive serologies and arthralgias, discussed trialing hydroxychloroquine to see if patient has any benefit.  Additionally, given the undifferentiated connective tissue disease can be on the differential, will screen for pulmonary hypertension and ILD.  -Start hydroxychloroquine alternating between 200 mg and 400 mg daily. Benefits and risks of hydroxychloroquine, including but not limited to retinal toxicity, corneal deposits, gastrointestinal side effects, and headaches were discussed with the patient. The need for a regular eye exam to monitor for ocular toxicity while on this medication was also explained to the patient.    - Ophthalmology referral placed for retinal screening  -Obtain baseline TTE and PFTs to evaluate for ILD and pulmonary hypertension  Orders:    Ambulatory Referral to Rheumatology    Undifferentiated connective tissue disease (HCC)    Orders:    hydroxychloroquine (PLAQUENIL) 200 mg tablet; Alternate between 200mg and 400mg daily    Complete PFT with post  bronchodilator; Future    Echo complete w/ contrast if indicated; Future    Ambulatory Referral to Ophthalmology; Future    Long-term use of Plaquenil  Ophthalmology referral placed for retinal screening while on hydroxychloroquine  Orders:    Ambulatory Referral to Ophthalmology; Future    RTC in 3 months     Pertinent Medical History   Reviewed         History of Present Illness   Megan Richmond is a 44 y.o. female with a history of IBS, osteoarthritis who presents for further evaluation of an elevated JANAK and centromere antibody.      HPI   Patient reports her symptoms started about 8 years ago when she was pregnant.  Reported that her hands felt like they were contracted.  The pain was worse in the morning.  Reports that she was seen by a rheumatologist with no clear diagnosis at that time.    For the past 1 year, patient endorses worsening fatigue.  Also reports pain in her hands, elbows and feet.  Reports that the hands feel stiff, however lasts all day.  Denies any improvement with use or as the day goes on.  Pain is not necessarily worse with activity.  Denies any joint swelling.  Patient has tried Voltaren gel for her feet which does not help.  Patient takes ibuprofen as needed which she does notice some benefit from.    Per chart review patient was seen by [facility] in 2021 for an JANAK of 1: 640 and low C3 and C4.  Patient had no other clinical manifestations of a systemic autoimmune disease and the plan was to continue to monitor symptoms.    Family hx: mother with RA     Review of Systems  Complete ROS conducted as per [facility]. In addition, denies:  Fever  Photosensitive rash  Sicca symptoms  Recurrent oral ulcers  Muscle weakness  Uveitis  Dactylitis  Chest pain  SOB  Pleurisy  Gross hematuria  Foamy urine  Raynaud's  History of VTE or miscarriages  Joint issues other than noted above    Current Outpatient Medications on File Prior to Visit   Medication Sig Dispense Refill    diphenhydrAMINE  (BENADRYL) 25 mg capsule Take 25 mg by mouth every 6 (six) hours as needed for itching      hyoscyamine (LEVSIN/SL) 0.125 mg SL tablet Take 1 tablet (0.125 mg total) by mouth every 4 (four) hours as needed for cramping or diarrhea 30 tablet 0    ibuprofen (MOTRIN) 200 mg tablet Take by mouth every 6 (six) hours as needed for mild pain      ipratropium (ATROVENT) 0.03 % nasal spray 2 sprays into each nostril every 12 (twelve) hours 30 mL 0    Mirabegron ER 50 MG TB24 Take 1 tablet (50 mg total) by mouth in the morning 90 tablet 1    Multiple Vitamin (multivitamin) capsule Take 1 capsule by mouth daily      Probiotic Product (PROBIOTIC DAILY PO) Take 1 tablet by mouth in the morning For vaginal and bladder health      tretinoin (RETIN-A) 0.025 % cream APPLY PEA SIZE AMOUNT TO AFFECTED AREAS 3 NIGHTS A WEEK, THEN APPLY NIGHTLY AS TOLERATED.      EPINEPHrine (EPIPEN) 0.3 mg/0.3 mL SOAJ Inject 0.3 mL (0.3 mg total) into a muscle once for 1 dose 0.6 mL 0    [DISCONTINUED] gabapentin (NEURONTIN) 400 mg capsule Take 1 capsule (400 mg total) by mouth daily at bedtime (Patient not taking: Reported on 4/24/2025) 90 capsule 0    [DISCONTINUED] predniSONE 10 mg tablet Take 5 tabs x 2 days with food, 4 tabs x 2 days, 3 tabs x 2 days, 2 tabs x 2 days, 1 tab x 2 days.  Avoid taking close to bedtime. (Patient not taking: Reported on 4/24/2025) 30 tablet 0    [DISCONTINUED] tiZANidine (ZANAFLEX) 4 mg tablet Take 1 tablet (4 mg total) by mouth daily at bedtime (Patient not taking: Reported on 4/24/2025) 15 tablet 0     Current Facility-Administered Medications on File Prior to Visit   Medication Dose Route Frequency Provider Last Rate Last Admin    lidocaine (XYLOCAINE) 1 % injection 1 mL  1 mL Injection     1 mL at 08/28/24 1330    triamcinolone acetonide (KENALOG-40) 40 mg/mL injection 20 mg  20 mg Intra-articular     20 mg at 08/28/24 1330      Social History     Tobacco Use    Smoking status: Never    Smokeless tobacco: Never  "  Vaping Use    Vaping status: Never Used   Substance and Sexual Activity    Alcohol use: Yes     Alcohol/week: 6.0 standard drinks of alcohol     Types: 6 Glasses of wine per week     Comment: 1 glass of wine per night    Drug use: Not Currently    Sexual activity: Yes     Partners: Male     Birth control/protection: Female Sterilization         Objective   /72 (BP Location: Left arm, Patient Position: Sitting, Cuff Size: Standard)   Pulse 61   Temp 97.5 °F (36.4 °C) (Tympanic)   Ht 5' 5.5\" (1.664 m)   Wt 67 kg (147 lb 9.6 oz)   LMP 04/20/2025 (Exact Date)   SpO2 98%   BMI 24.19 kg/m²     Physical Exam  General appearance: normal appearing, no acute distress  Skin: normal, no rashes  HEENT: normal, moist oropharynx, no nasal or oral ulcers  Lymph nodes: no palpable adenopathy  Lungs: normal respiratory effort, comfortable on room air, lungs clear to auscultation b/l   Heart: normal heart sounds, normal rate, normal rhythm,  Abdomen: soft, normal bowel sounds, no tenderness  Neurologic: no obvious neurological deficits   Extremities: no edema, warm and well perfused     Musculoskeletal Exam:   - Observation: no obvious joint abnormalities    - Palpation: no joint tenderness  - Synovitis: absent  - Joint effusions: absent  - ROM: intact throughout  - Muscle Strength: 5/5 throughout     Recent labs:  Lab Results   Component Value Date/Time    SODIUM 137 04/15/2025 11:05 AM    K 4.1 04/15/2025 11:05 AM    BUN 14 04/15/2025 11:05 AM    CREATININE 0.66 04/15/2025 11:05 AM    CALCIUM 9.3 04/15/2025 11:05 AM    AST 23 04/15/2025 11:05 AM    ALT 15 04/15/2025 11:05 AM    ALB 4.5 04/15/2025 11:05 AM    TP 6.7 04/15/2025 11:05 AM    EGFR 109 04/15/2025 11:05 AM     Lab Results   Component Value Date/Time    HGB 12.8 04/15/2025 11:05 AM    WBC 8.80 04/15/2025 11:05 AM     04/15/2025 11:05 AM     Lab Results   Component Value Date/Time    TJK1ODMXHTBT 2.050 04/15/2025 11:05 AM     JANAK 1: 1280  Anticentromere " 58  SSA negative  SSB negative  Smith negative  U1 RNP negative  SCL 70 negative  Ed[place] 1 negative  RF negative  CCP negative    I have personally reviewed notes, labs, and imaging available in the chart.     Abida Branch DO, CCD, [facility]

## 2025-05-01 ENCOUNTER — CONSULT (OUTPATIENT)
Age: 45
End: 2025-05-01
Payer: COMMERCIAL

## 2025-05-01 VITALS
TEMPERATURE: 97.5 F | SYSTOLIC BLOOD PRESSURE: 116 MMHG | BODY MASS INDEX: 23.72 KG/M2 | DIASTOLIC BLOOD PRESSURE: 72 MMHG | HEIGHT: 66 IN | OXYGEN SATURATION: 98 % | WEIGHT: 147.6 LBS | HEART RATE: 61 BPM

## 2025-05-01 DIAGNOSIS — M35.9 UNDIFFERENTIATED CONNECTIVE TISSUE DISEASE (HCC): Primary | ICD-10-CM

## 2025-05-01 DIAGNOSIS — R76.8 ELEVATED ANTINUCLEAR ANTIBODY (ANA) LEVEL: ICD-10-CM

## 2025-05-01 DIAGNOSIS — Z79.899 LONG-TERM USE OF PLAQUENIL: ICD-10-CM

## 2025-05-01 PROCEDURE — 99204 OFFICE O/P NEW MOD 45 MIN: CPT | Performed by: STUDENT IN AN ORGANIZED HEALTH CARE EDUCATION/TRAINING PROGRAM

## 2025-05-01 RX ORDER — HYDROXYCHLOROQUINE SULFATE 200 MG/1
TABLET, FILM COATED ORAL
Qty: 60 TABLET | Refills: 1 | Status: SHIPPED | OUTPATIENT
Start: 2025-05-01 | End: 2025-11-01

## 2025-05-19 DIAGNOSIS — N32.89 BLADDER SPASM: ICD-10-CM

## 2025-05-19 DIAGNOSIS — N30.90 CYSTITIS: ICD-10-CM

## 2025-05-19 DIAGNOSIS — R39.14 FEELING OF INCOMPLETE BLADDER EMPTYING: ICD-10-CM

## 2025-05-20 RX ORDER — MIRABEGRON 50 MG/1
50 TABLET, FILM COATED, EXTENDED RELEASE ORAL DAILY
Qty: 90 TABLET | Refills: 0 | Status: SHIPPED | OUTPATIENT
Start: 2025-05-20

## 2025-05-21 ENCOUNTER — RESULTS FOLLOW-UP (OUTPATIENT)
Age: 45
End: 2025-05-21

## 2025-05-21 ENCOUNTER — HOSPITAL ENCOUNTER (OUTPATIENT)
Dept: PULMONOLOGY | Facility: HOSPITAL | Age: 45
Discharge: HOME | End: 2025-05-21
Payer: COMMERCIAL

## 2025-05-21 DIAGNOSIS — M35.9 UNDIFFERENTIATED CONNECTIVE TISSUE DISEASE (HCC): ICD-10-CM

## 2025-05-21 PROCEDURE — 94060 EVALUATION OF WHEEZING: CPT | Performed by: INTERNAL MEDICINE

## 2025-05-21 PROCEDURE — 94729 DIFFUSING CAPACITY: CPT | Performed by: INTERNAL MEDICINE

## 2025-05-21 PROCEDURE — 94760 N-INVAS EAR/PLS OXIMETRY 1: CPT

## 2025-05-21 PROCEDURE — 94060 EVALUATION OF WHEEZING: CPT

## 2025-05-21 PROCEDURE — 94729 DIFFUSING CAPACITY: CPT

## 2025-05-21 PROCEDURE — 94726 PLETHYSMOGRAPHY LUNG VOLUMES: CPT | Performed by: INTERNAL MEDICINE

## 2025-05-21 PROCEDURE — 94726 PLETHYSMOGRAPHY LUNG VOLUMES: CPT

## 2025-05-21 RX ORDER — ALBUTEROL SULFATE 0.83 MG/ML
2.5 SOLUTION RESPIRATORY (INHALATION) ONCE
Status: COMPLETED | OUTPATIENT
Start: 2025-05-21 | End: 2025-05-21

## 2025-05-21 RX ORDER — ALBUTEROL SULFATE 0.83 MG/ML
2.5 SOLUTION RESPIRATORY (INHALATION) ONCE
Status: DISCONTINUED | OUTPATIENT
Start: 2025-05-21 | End: 2025-05-25 | Stop reason: HOSPADM

## 2025-05-21 RX ADMIN — ALBUTEROL SULFATE 2.5 MG: 2.5 SOLUTION RESPIRATORY (INHALATION) at 10:40

## 2025-05-29 ENCOUNTER — OFFICE VISIT (OUTPATIENT)
Dept: GASTROENTEROLOGY | Facility: MEDICAL CENTER | Age: 45
End: 2025-05-29
Payer: COMMERCIAL

## 2025-05-29 ENCOUNTER — TELEPHONE (OUTPATIENT)
Dept: GASTROENTEROLOGY | Facility: MEDICAL CENTER | Age: 45
End: 2025-05-29

## 2025-05-29 VITALS
OXYGEN SATURATION: 99 % | BODY MASS INDEX: 24.32 KG/M2 | TEMPERATURE: 98.4 F | HEART RATE: 71 BPM | SYSTOLIC BLOOD PRESSURE: 113 MMHG | HEIGHT: 65 IN | DIASTOLIC BLOOD PRESSURE: 77 MMHG | WEIGHT: 146 LBS

## 2025-05-29 DIAGNOSIS — R09.A2 GLOBUS SENSATION: ICD-10-CM

## 2025-05-29 DIAGNOSIS — K52.9 CHRONIC DIARRHEA: ICD-10-CM

## 2025-05-29 DIAGNOSIS — R13.10 DYSPHAGIA, UNSPECIFIED TYPE: Primary | ICD-10-CM

## 2025-05-29 DIAGNOSIS — K58.0 IRRITABLE BOWEL SYNDROME WITH DIARRHEA: ICD-10-CM

## 2025-05-29 DIAGNOSIS — Z83.719 FAMILY HISTORY OF COLONIC POLYPS: ICD-10-CM

## 2025-05-29 PROCEDURE — 99214 OFFICE O/P EST MOD 30 MIN: CPT | Performed by: PHYSICIAN ASSISTANT

## 2025-05-29 RX ORDER — SODIUM CHLORIDE, SODIUM LACTATE, POTASSIUM CHLORIDE, CALCIUM CHLORIDE 600; 310; 30; 20 MG/100ML; MG/100ML; MG/100ML; MG/100ML
125 INJECTION, SOLUTION INTRAVENOUS CONTINUOUS
OUTPATIENT
Start: 2025-05-29

## 2025-05-29 NOTE — PROGRESS NOTES
"Name: Megan Richmond      : 1980      MRN: [id number]  Encounter Provider: Kathy Corey PA-C  Encounter Date: 2025   Encounter department: [facility]  :  Assessment & Plan  Dysphagia, unspecified type  Globus sensation  The patient has a history of undifferentiated connective tissue disease presenting for initial evaluation of dysphagia.  The patient has been experiencing dysphagia to solids over the past month or so after an acute upper respiratory infection in April.  She reports about 10 days in the past month she felt like food went down slowly through the level of her sternal notch.  Water was able to pass but she felt like she had to \"force it down\".  She then had a globus sensation at the level of her sternal notch which lasted for hours at a time.  That a globus sensation is felt intermittently.  She did have 1 rare instance of heartburn.   She believes she might have been experiencing reflux so she tried over-the-counter Pepcid, antacids, which did not help.  She denies vomiting, odynophagia, chest pain, shortness of breath.      Due to her current rheumatologic condition we will evaluate with EGD and obtain biopsies.  Will order barium swallow, begin workup for achalasia given her father's history of the same.      Orders:    EGD; Future    FL barium swallow ROUTINE esophagus; Future    Chronic diarrhea  Never had colonoscopy.  Family history of polyps- Father age 40's-50's.  Will perform colonoscopy at the time of endoscopy.  Recommend biopsies to rule out microscopic colitis given the patient's autoimmune disease history.    Using levsin prn for IBS-D history. Using this at least monthly. Avoiding food triggers.   I recommended trial of xifaxin after colonoscopy.  Orders:    Colonoscopy; Future    Family history of colonic polyps  As above.           History of Present Illness   HPI  Megan Richmond is a 44 y.o. female with history of undifferentiated " "connective tissue disease presents for elevation of dysphagia.  Patient is new to me, but known to Gastroenterology.  Last office visit with Dr. Andres (1/03/2024) for diarrhea.     The patient has been experiencing dysphagia to solids over the past month or so after an acute upper respiratory infection in April.  Patient was recently seen by her PCP 4/15/2025 for throat pain, and arthralgias.  She has had a recent rheumatologic work-up and was diagnosed with Undifferentiated CT disease.      About 10 days in the past month,  she felt food passing slowly at the level of her sternal notch.  She feels like food gets stuck at this level.  She reports episodes are random and not related to certain foods.  She had no dysphagia to water or other liquids, but she felt like she had to \"force\" water down in these instances.  She then had a globus sensation at the level of her sternal notch which lasted for hours at a time.  That a globus sensation is still felt intermittently.  She did have 1 rare instance of heartburn.   She believes she might have been experiencing reflux so she tried over-the-counter Pepcid, antacids, which did not help.  She denies vomiting, odynophagia, chest pain, shortness of breath.  She denies dry eye, dry mouth, skin rashes, ulcers, or eye inflammation.  Denies smoking.     She was was diagnosed with IBS-D years ago due to symptoms of abdominal pain and diarrhea.  She is still having 5 BMs/day.  Formed, soft stool.  Triggers for diarrhea include raw onion, garlic, cauliflower, broccoli, greasy foods.  She has intermittent bloating.  She has tried the FODMAP diet, fiber, probiotics.  She was seen by [facility] in 2019 for IBS-D and was recommended to try rifaximin for symptoms.  He has not yet tried this.  She denies rectal bleeding or weight loss.     Father with history of Achalasia.  Mother, oropharyngeal cancer.  Family history of colon polyps- Father age 40's-50's.  No family history of IBD or " "colorectal cancer.    Labs (2/2024): Negative celiac disease  Fecal Calpro normal  Giardia negative  No prior colonoscopy    History obtained from: patient    Review of Systems   All other systems reviewed and are negative.    Pertinent Medical History       Medical History Reviewed by provider this encounter:     .  Past Medical History   Past Medical History[1]  Past Surgical History[2]  Family History[3]   reports that she has never smoked. She has never used smokeless tobacco. She reports current alcohol use of about 6.0 standard drinks of alcohol per week. She reports that she does not currently use drugs.  Current Outpatient Medications   Medication Instructions    diphenhydrAMINE (BENADRYL) 25 mg, Every 6 hours PRN    EPINEPHrine (EPIPEN) 0.3 mg, Intramuscular, Once    hydroxychloroquine (PLAQUENIL) 200 mg tablet Alternate between 200mg and 400mg daily    hyoscyamine (LEVSIN/SL) 0.125 mg, Oral, Every 4 hours PRN    ibuprofen (MOTRIN) 200 mg tablet Every 6 hours PRN    ipratropium (ATROVENT) 0.03 % nasal spray 2 sprays, Nasal, Every 12 hours    Mirabegron ER 50 mg, Oral, Daily    Multiple Vitamin (multivitamin) capsule 1 capsule, Daily    Probiotic Product (PROBIOTIC DAILY PO) 1 tablet, Daily    tretinoin (RETIN-A) 0.025 % cream    Allergies[4]   Medications Ordered Prior to Encounter[5]   Social History[6]     Objective   /77 (BP Location: Left arm, Patient Position: Sitting, Cuff Size: Standard)   Pulse 71   Temp 98.4 °F (36.9 °C) (Tympanic)   Ht 5' 5\" (1.651 m)   Wt 66.2 kg (146 lb)   SpO2 99%   BMI 24.30 kg/m²      Physical Exam  Constitutional:       Appearance: Normal appearance.     Eyes:      Conjunctiva/sclera: Conjunctivae normal.       Cardiovascular:      Rate and Rhythm: Normal rate and regular rhythm.      Pulses: Normal pulses.      Heart sounds: Normal heart sounds.   Pulmonary:      Effort: Pulmonary effort is normal.   Abdominal:      General: Abdomen is flat. There is no " distension.      Palpations: Abdomen is soft.      Tenderness: There is no guarding or rebound.     Skin:     General: Skin is warm and dry.     Neurological:      Mental Status: She is alert. Mental status is at baseline.     Psychiatric:         Mood and Affect: Mood normal.                  [1]   Past Medical History:  Diagnosis Date    Abnormal ECG     Abnormal Pap smear of cervix     Allergic     Seasonal, skin in right nostril unhealing fissure, improved    Anxiety 3/2020    Arthritis 2021    Unable to open jars or use thimbs without sharp pain    Depression     Eczema     Dry skin all my life    Fibrocystic breast     IBS (irritable bowel syndrome)     Osteoarthritis 2020    Ovarian cyst     Plantar fasciitis     Skin tag     Left nipple, previously removed a couple myself with floss    Sleep difficulties     STD (sexually transmitted disease)     HPV over 10 years ago    Stomach disorder     Ibs    Urinary incontinence     Dribbles when trying to sleep through uti like symptoms at night    Urinary tract infection     15 years ago    Varicella     As a child   [2]   Past Surgical History:  Procedure Laterality Date     SECTION      x 2    CLAVICLE SURGERY Left     SALPINGECTOMY Bilateral 2018    SHOULDER SURGERY      Compound clavicle fx repair    SKIN BIOPSY      Lateral left breast and back mole-negative    TUBAL LIGATION      Tubes removed wit 2018 c section   [3]   Family History  Problem Relation Name Age of Onset    Breast cancer Mother Mom 40        Brca negative    Rheum arthritis Mother Mom     Throat cancer Mother Mom     Cancer Mother Mom         Breast and oropharyngeal CA    Arthritis Mother Mom         Rheumatoid arthritis    Hyperlipidemia Mother Mom         Controlled with a statin    Rheumatologic disease Mother Mom         RA  started    Hypertension Father Dad     Heart disease Father Dad     Sleep apnea Father Dad     Depression Father Dad          Undiagnosed specific    Anxiety disorder Father Dad     Deep vein thrombosis Father Dad     Rashes / Skin problems Father Dad         Multiple precancerous spots removed    Arthritis Father Dad         Osteo, severe    Clotting disorder Father Dad         Xarelto for DVTs    Cancer Sister Henry 40        Multiple myeloma diagnosed feb this year    No Known Problems Daughter      Breast cancer Maternal Grandmother Gram 80    Cancer Maternal Grandmother Gram         Breast CA    No Known Problems Maternal Grandfather      Stroke Paternal Grandmother Sharon Santos     No Known Problems Paternal Grandfather      No Known Problems Maternal Aunt      No Known Problems Maternal Aunt      No Known Problems Paternal Aunt      No Known Problems Paternal Aunt      Colon cancer Other MGA    [4]   Allergies  Allergen Reactions    Bee Venom Swelling    Sulfamethoxazole-Trimethoprim Rash     rash   [5]   Current Outpatient Medications on File Prior to Visit   Medication Sig Dispense Refill    diphenhydrAMINE (BENADRYL) 25 mg capsule Take 25 mg by mouth every 6 (six) hours as needed for itching      EPINEPHrine (EPIPEN) 0.3 mg/0.3 mL SOAJ Inject 0.3 mL (0.3 mg total) into a muscle once for 1 dose 0.6 mL 0    hydroxychloroquine (PLAQUENIL) 200 mg tablet Alternate between 200mg and 400mg daily 60 tablet 1    hyoscyamine (LEVSIN/SL) 0.125 mg SL tablet Take 1 tablet (0.125 mg total) by mouth every 4 (four) hours as needed for cramping or diarrhea 30 tablet 0    ibuprofen (MOTRIN) 200 mg tablet Take by mouth every 6 (six) hours as needed for mild pain      ipratropium (ATROVENT) 0.03 % nasal spray 2 sprays into each nostril every 12 (twelve) hours 30 mL 0    Mirabegron ER 50 MG TB24 Take 1 tablet (50 mg total) by mouth in the morning 90 tablet 0    Multiple Vitamin (multivitamin) capsule Take 1 capsule by mouth in the morning.      Probiotic Product (PROBIOTIC DAILY PO) Take 1 tablet by mouth in the morning For vaginal and bladder health       tretinoin (RETIN-A) 0.025 % cream        Current Facility-Administered Medications on File Prior to Visit   Medication Dose Route Frequency Provider Last Rate Last Admin    lidocaine (XYLOCAINE) 1 % injection 1 mL  1 mL Injection     1 mL at 08/28/24 1330    triamcinolone acetonide (KENALOG-40) 40 mg/mL injection 20 mg  20 mg Intra-articular     20 mg at 08/28/24 1330   [6]   Social History  Tobacco Use    Smoking status: Never    Smokeless tobacco: Never   Vaping Use    Vaping status: Never Used   Substance and Sexual Activity    Alcohol use: Yes     Alcohol/week: 6.0 standard drinks of alcohol     Types: 6 Glasses of wine per week     Comment: 1 glass of wine per night    Drug use: Not Currently    Sexual activity: Yes     Partners: Male     Birth control/protection: Female Sterilization

## 2025-05-29 NOTE — H&P (VIEW-ONLY)
"Name: Megan Richmond      : 1980      MRN: [id number]  Encounter Provider: Kathy Corey PA-C  Encounter Date: 2025   Encounter department: [facility]  :  Assessment & Plan  Dysphagia, unspecified type  Globus sensation  The patient has a history of undifferentiated connective tissue disease presenting for initial evaluation of dysphagia.  The patient has been experiencing dysphagia to solids over the past month or so after an acute upper respiratory infection in April.  She reports about 10 days in the past month she felt like food went down slowly through the level of her sternal notch.  Water was able to pass but she felt like she had to \"force it down\".  She then had a globus sensation at the level of her sternal notch which lasted for hours at a time.  That a globus sensation is felt intermittently.  She did have 1 rare instance of heartburn.   She believes she might have been experiencing reflux so she tried over-the-counter Pepcid, antacids, which did not help.  She denies vomiting, odynophagia, chest pain, shortness of breath.      Due to her current rheumatologic condition we will evaluate with EGD and obtain biopsies.  Will order barium swallow, begin workup for achalasia given her father's history of the same.      Orders:    EGD; Future    FL barium swallow ROUTINE esophagus; Future    Chronic diarrhea  Never had colonoscopy.  Family history of polyps- Father age 40's-50's.  Will perform colonoscopy at the time of endoscopy.  Recommend biopsies to rule out microscopic colitis given the patient's autoimmune disease history.    Using levsin prn for IBS-D history. Using this at least monthly. Avoiding food triggers.   I recommended trial of xifaxin after colonoscopy.  Orders:    Colonoscopy; Future    Family history of colonic polyps  As above.           History of Present Illness   HPI  Megan Richmond is a 44 y.o. female with history of undifferentiated " "connective tissue disease presents for elevation of dysphagia.  Patient is new to me, but known to Gastroenterology.  Last office visit with Dr. Andres (1/03/2024) for diarrhea.     The patient has been experiencing dysphagia to solids over the past month or so after an acute upper respiratory infection in April.  Patient was recently seen by her PCP 4/15/2025 for throat pain, and arthralgias.  She has had a recent rheumatologic work-up and was diagnosed with Undifferentiated CT disease.      About 10 days in the past month,  she felt food passing slowly at the level of her sternal notch.  She feels like food gets stuck at this level.  She reports episodes are random and not related to certain foods.  She had no dysphagia to water or other liquids, but she felt like she had to \"force\" water down in these instances.  She then had a globus sensation at the level of her sternal notch which lasted for hours at a time.  That a globus sensation is still felt intermittently.  She did have 1 rare instance of heartburn.   She believes she might have been experiencing reflux so she tried over-the-counter Pepcid, antacids, which did not help.  She denies vomiting, odynophagia, chest pain, shortness of breath.  She denies dry eye, dry mouth, skin rashes, ulcers, or eye inflammation.  Denies smoking.     She was was diagnosed with IBS-D years ago due to symptoms of abdominal pain and diarrhea.  She is still having 5 BMs/day.  Formed, soft stool.  Triggers for diarrhea include raw onion, garlic, cauliflower, broccoli, greasy foods.  She has intermittent bloating.  She has tried the FODMAP diet, fiber, probiotics.  She was seen by [facility] in 2019 for IBS-D and was recommended to try rifaximin for symptoms.  He has not yet tried this.  She denies rectal bleeding or weight loss.     Father with history of Achalasia.  Mother, oropharyngeal cancer.  Family history of colon polyps- Father age 40's-50's.  No family history of IBD or " "colorectal cancer.    Labs (2/2024): Negative celiac disease  Fecal Calpro normal  Giardia negative  No prior colonoscopy    History obtained from: patient    Review of Systems   All other systems reviewed and are negative.    Pertinent Medical History       Medical History Reviewed by provider this encounter:     .  Past Medical History   Past Medical History[1]  Past Surgical History[2]  Family History[3]   reports that she has never smoked. She has never used smokeless tobacco. She reports current alcohol use of about 6.0 standard drinks of alcohol per week. She reports that she does not currently use drugs.  Current Outpatient Medications   Medication Instructions    diphenhydrAMINE (BENADRYL) 25 mg, Every 6 hours PRN    EPINEPHrine (EPIPEN) 0.3 mg, Intramuscular, Once    hydroxychloroquine (PLAQUENIL) 200 mg tablet Alternate between 200mg and 400mg daily    hyoscyamine (LEVSIN/SL) 0.125 mg, Oral, Every 4 hours PRN    ibuprofen (MOTRIN) 200 mg tablet Every 6 hours PRN    ipratropium (ATROVENT) 0.03 % nasal spray 2 sprays, Nasal, Every 12 hours    Mirabegron ER 50 mg, Oral, Daily    Multiple Vitamin (multivitamin) capsule 1 capsule, Daily    Probiotic Product (PROBIOTIC DAILY PO) 1 tablet, Daily    tretinoin (RETIN-A) 0.025 % cream    Allergies[4]   Medications Ordered Prior to Encounter[5]   Social History[6]     Objective   /77 (BP Location: Left arm, Patient Position: Sitting, Cuff Size: Standard)   Pulse 71   Temp 98.4 °F (36.9 °C) (Tympanic)   Ht 5' 5\" (1.651 m)   Wt 66.2 kg (146 lb)   SpO2 99%   BMI 24.30 kg/m²      Physical Exam  Constitutional:       Appearance: Normal appearance.     Eyes:      Conjunctiva/sclera: Conjunctivae normal.       Cardiovascular:      Rate and Rhythm: Normal rate and regular rhythm.      Pulses: Normal pulses.      Heart sounds: Normal heart sounds.   Pulmonary:      Effort: Pulmonary effort is normal.   Abdominal:      General: Abdomen is flat. There is no " distension.      Palpations: Abdomen is soft.      Tenderness: There is no guarding or rebound.     Skin:     General: Skin is warm and dry.     Neurological:      Mental Status: She is alert. Mental status is at baseline.     Psychiatric:         Mood and Affect: Mood normal.                  [1]   Past Medical History:  Diagnosis Date    Abnormal ECG     Abnormal Pap smear of cervix     Allergic     Seasonal, skin in right nostril unhealing fissure, improved    Anxiety 3/2020    Arthritis 2021    Unable to open jars or use thimbs without sharp pain    Depression     Eczema     Dry skin all my life    Fibrocystic breast     IBS (irritable bowel syndrome)     Osteoarthritis 2020    Ovarian cyst     Plantar fasciitis     Skin tag     Left nipple, previously removed a couple myself with floss    Sleep difficulties     STD (sexually transmitted disease)     HPV over 10 years ago    Stomach disorder     Ibs    Urinary incontinence     Dribbles when trying to sleep through uti like symptoms at night    Urinary tract infection     15 years ago    Varicella     As a child   [2]   Past Surgical History:  Procedure Laterality Date     SECTION      x 2    CLAVICLE SURGERY Left     SALPINGECTOMY Bilateral 2018    SHOULDER SURGERY      Compound clavicle fx repair    SKIN BIOPSY      Lateral left breast and back mole-negative    TUBAL LIGATION      Tubes removed wit 2018 c section   [3]   Family History  Problem Relation Name Age of Onset    Breast cancer Mother Mom 40        Brca negative    Rheum arthritis Mother Mom     Throat cancer Mother Mom     Cancer Mother Mom         Breast and oropharyngeal CA    Arthritis Mother Mom         Rheumatoid arthritis    Hyperlipidemia Mother Mom         Controlled with a statin    Rheumatologic disease Mother Mom         RA  started    Hypertension Father Dad     Heart disease Father Dad     Sleep apnea Father Dad     Depression Father Dad          Undiagnosed specific    Anxiety disorder Father Dad     Deep vein thrombosis Father Dad     Rashes / Skin problems Father Dad         Multiple precancerous spots removed    Arthritis Father Dad         Osteo, severe    Clotting disorder Father Dad         Xarelto for DVTs    Cancer Sister Henry 40        Multiple myeloma diagnosed feb this year    No Known Problems Daughter      Breast cancer Maternal Grandmother Gram 80    Cancer Maternal Grandmother Gram         Breast CA    No Known Problems Maternal Grandfather      Stroke Paternal Grandmother Sharon Santos     No Known Problems Paternal Grandfather      No Known Problems Maternal Aunt      No Known Problems Maternal Aunt      No Known Problems Paternal Aunt      No Known Problems Paternal Aunt      Colon cancer Other MGA    [4]   Allergies  Allergen Reactions    Bee Venom Swelling    Sulfamethoxazole-Trimethoprim Rash     rash   [5]   Current Outpatient Medications on File Prior to Visit   Medication Sig Dispense Refill    diphenhydrAMINE (BENADRYL) 25 mg capsule Take 25 mg by mouth every 6 (six) hours as needed for itching      EPINEPHrine (EPIPEN) 0.3 mg/0.3 mL SOAJ Inject 0.3 mL (0.3 mg total) into a muscle once for 1 dose 0.6 mL 0    hydroxychloroquine (PLAQUENIL) 200 mg tablet Alternate between 200mg and 400mg daily 60 tablet 1    hyoscyamine (LEVSIN/SL) 0.125 mg SL tablet Take 1 tablet (0.125 mg total) by mouth every 4 (four) hours as needed for cramping or diarrhea 30 tablet 0    ibuprofen (MOTRIN) 200 mg tablet Take by mouth every 6 (six) hours as needed for mild pain      ipratropium (ATROVENT) 0.03 % nasal spray 2 sprays into each nostril every 12 (twelve) hours 30 mL 0    Mirabegron ER 50 MG TB24 Take 1 tablet (50 mg total) by mouth in the morning 90 tablet 0    Multiple Vitamin (multivitamin) capsule Take 1 capsule by mouth in the morning.      Probiotic Product (PROBIOTIC DAILY PO) Take 1 tablet by mouth in the morning For vaginal and bladder health       tretinoin (RETIN-A) 0.025 % cream        Current Facility-Administered Medications on File Prior to Visit   Medication Dose Route Frequency Provider Last Rate Last Admin    lidocaine (XYLOCAINE) 1 % injection 1 mL  1 mL Injection     1 mL at 08/28/24 1330    triamcinolone acetonide (KENALOG-40) 40 mg/mL injection 20 mg  20 mg Intra-articular     20 mg at 08/28/24 1330   [6]   Social History  Tobacco Use    Smoking status: Never    Smokeless tobacco: Never   Vaping Use    Vaping status: Never Used   Substance and Sexual Activity    Alcohol use: Yes     Alcohol/week: 6.0 standard drinks of alcohol     Types: 6 Glasses of wine per week     Comment: 1 glass of wine per night    Drug use: Not Currently    Sexual activity: Yes     Partners: Male     Birth control/protection: Female Sterilization

## 2025-05-29 NOTE — ASSESSMENT & PLAN NOTE
Levsin prn for symptoms. Using this at least monthly. Avoiding raw onion garlicand caulifloer and berocoli.

## 2025-05-29 NOTE — TELEPHONE ENCOUNTER
Procedure: Colon/EGD  Date: 6/26/25  Physician performing: Dr. Andres  Location of procedure:  [place]  Instructions given to patient: Miralax  Diabetic: No  Clearances: N/A

## 2025-05-30 ENCOUNTER — HOSPITAL ENCOUNTER (OUTPATIENT)
Dept: NON INVASIVE DIAGNOSTICS | Facility: HOSPITAL | Age: 45
Discharge: HOME | End: 2025-05-30
Payer: COMMERCIAL

## 2025-05-30 VITALS
DIASTOLIC BLOOD PRESSURE: 77 MMHG | BODY MASS INDEX: 24.32 KG/M2 | HEIGHT: 65 IN | WEIGHT: 146 LBS | HEART RATE: 71 BPM | SYSTOLIC BLOOD PRESSURE: 113 MMHG

## 2025-05-30 DIAGNOSIS — M35.9 UNDIFFERENTIATED CONNECTIVE TISSUE DISEASE (HCC): ICD-10-CM

## 2025-05-30 LAB
AORTIC ROOT: 2.6 CM
ASCENDING AORTA: 3 CM
BSA FOR ECHO PROCEDURE: 1.73 M2
E WAVE DECELERATION TIME: 128 MS
E/A RATIO: 1.56
FRACTIONAL SHORTENING: 26 (ref 28–44)
INTERVENTRICULAR SEPTUM IN DIASTOLE (PARASTERNAL SHORT AXIS VIEW): 0.8 CM
INTERVENTRICULAR SEPTUM: 0.8 CM (ref 0.6–1.1)
IVC: 22 MM
LAAS-AP2: 16.8 CM2
LAAS-AP4: 15.3 CM2
LEFT ATRIUM SIZE: 3.4 CM
LEFT ATRIUM VOLUME (MOD BIPLANE): 42 ML
LEFT ATRIUM VOLUME INDEX (MOD BIPLANE): 24.3 ML/M2
LEFT INTERNAL DIMENSION IN SYSTOLE: 2.9 CM (ref 2.1–4)
LEFT VENTRICULAR INTERNAL DIMENSION IN DIASTOLE: 3.9 CM (ref 3.5–6)
LEFT VENTRICULAR POSTERIOR WALL IN END DIASTOLE: 0.8 CM
LEFT VENTRICULAR STROKE VOLUME: 31 ML
LV EF US.2D.A4C+ESTIMATED: 63 %
LVSV (TEICH): 31 ML
MV E'TISSUE VEL-LAT: 16 CM/S
MV E'TISSUE VEL-SEP: 12 CM/S
MV PEAK A VEL: 0.5 M/S
MV PEAK E VEL: 78 CM/S
MV STENOSIS PRESSURE HALF TIME: 37 MS
MV VALVE AREA P 1/2 METHOD: 5.95
RA PRESSURE ESTIMATED: 8 MMHG
RIGHT ATRIUM AREA SYSTOLE A4C: 14.9 CM2
RIGHT VENTRICLE ID DIMENSION: 2.6 CM
SL CV LEFT ATRIUM LENGTH A2C: 4.6 CM
SL CV LV EF: 60
SL CV PED ECHO LEFT VENTRICLE DIASTOLIC VOLUME (MOD BIPLANE) 2D: 65 ML
SL CV PED ECHO LEFT VENTRICLE SYSTOLIC VOLUME (MOD BIPLANE) 2D: 33 ML
TRICUSPID ANNULAR PLANE SYSTOLIC EXCURSION: 2.3 CM

## 2025-05-30 PROCEDURE — 93306 TTE W/DOPPLER COMPLETE: CPT

## 2025-05-30 PROCEDURE — 93306 TTE W/DOPPLER COMPLETE: CPT | Performed by: INTERNAL MEDICINE

## 2025-06-01 DIAGNOSIS — J30.2 SEASONAL ALLERGIES: ICD-10-CM

## 2025-06-02 RX ORDER — IPRATROPIUM BROMIDE 21 UG/1
2 SPRAY, METERED NASAL EVERY 12 HOURS
OUTPATIENT
Start: 2025-06-02

## 2025-06-07 DIAGNOSIS — J30.2 SEASONAL ALLERGIES: ICD-10-CM

## 2025-06-07 DIAGNOSIS — M35.9 UNDIFFERENTIATED CONNECTIVE TISSUE DISEASE (HCC): ICD-10-CM

## 2025-06-09 ENCOUNTER — RESULTS FOLLOW-UP (OUTPATIENT)
Dept: GASTROENTEROLOGY | Facility: MEDICAL CENTER | Age: 45
End: 2025-06-09

## 2025-06-09 ENCOUNTER — HOSPITAL ENCOUNTER (OUTPATIENT)
Dept: RADIOLOGY | Facility: HOSPITAL | Age: 45
Discharge: HOME | End: 2025-06-09
Attending: PHYSICIAN ASSISTANT
Payer: COMMERCIAL

## 2025-06-09 DIAGNOSIS — R13.10 DYSPHAGIA, UNSPECIFIED TYPE: ICD-10-CM

## 2025-06-09 DIAGNOSIS — R09.A2 GLOBUS SENSATION: ICD-10-CM

## 2025-06-09 PROCEDURE — 74220 X-RAY XM ESOPHAGUS 1CNTRST: CPT

## 2025-06-09 RX ORDER — HYDROXYCHLOROQUINE SULFATE 200 MG/1
TABLET, FILM COATED ORAL
Qty: 60 TABLET | Refills: 2 | Status: SHIPPED | OUTPATIENT
Start: 2025-06-09 | End: 2025-12-08

## 2025-06-09 RX ORDER — IPRATROPIUM BROMIDE 21 UG/1
2 SPRAY, METERED NASAL EVERY 12 HOURS
Qty: 30 ML | Refills: 0 | Status: SHIPPED | OUTPATIENT
Start: 2025-06-09

## 2025-06-12 ENCOUNTER — ANESTHESIA EVENT (OUTPATIENT)
Dept: ANESTHESIOLOGY | Facility: HOSPITAL | Age: 45
End: 2025-06-12

## 2025-06-12 ENCOUNTER — TELEPHONE (OUTPATIENT)
Age: 45
End: 2025-06-12

## 2025-06-12 ENCOUNTER — ANESTHESIA (OUTPATIENT)
Dept: ANESTHESIOLOGY | Facility: HOSPITAL | Age: 45
End: 2025-06-12

## 2025-06-12 NOTE — TELEPHONE ENCOUNTER
Confirming Upcoming Procedure: colon/egd on 06/26/2025  Physician performing: Dr Andres  [place]  [facility] [place]   Prep: miralax prep     LVM and call back number. Send [employer]

## 2025-06-26 ENCOUNTER — HOSPITAL ENCOUNTER (OUTPATIENT)
Dept: GASTROENTEROLOGY | Facility: MEDICAL CENTER | Age: 45
Setting detail: OUTPATIENT SURGERY
End: 2025-06-26
Attending: PHYSICIAN ASSISTANT
Payer: COMMERCIAL

## 2025-06-26 ENCOUNTER — ANESTHESIA (OUTPATIENT)
Dept: GASTROENTEROLOGY | Facility: MEDICAL CENTER | Age: 45
End: 2025-06-26
Payer: COMMERCIAL

## 2025-06-26 ENCOUNTER — ANESTHESIA EVENT (OUTPATIENT)
Dept: GASTROENTEROLOGY | Facility: MEDICAL CENTER | Age: 45
End: 2025-06-26
Payer: COMMERCIAL

## 2025-06-26 VITALS
RESPIRATION RATE: 17 BRPM | HEART RATE: 62 BPM | HEIGHT: 65 IN | WEIGHT: 141 LBS | DIASTOLIC BLOOD PRESSURE: 64 MMHG | TEMPERATURE: 97.3 F | SYSTOLIC BLOOD PRESSURE: 111 MMHG | BODY MASS INDEX: 23.49 KG/M2 | OXYGEN SATURATION: 99 %

## 2025-06-26 DIAGNOSIS — K21.00 GASTROESOPHAGEAL REFLUX DISEASE WITH ESOPHAGITIS WITHOUT HEMORRHAGE: Primary | ICD-10-CM

## 2025-06-26 DIAGNOSIS — K52.9 CHRONIC DIARRHEA: ICD-10-CM

## 2025-06-26 DIAGNOSIS — R09.A2 GLOBUS SENSATION: ICD-10-CM

## 2025-06-26 DIAGNOSIS — R13.10 DYSPHAGIA, UNSPECIFIED TYPE: ICD-10-CM

## 2025-06-26 PROCEDURE — 43239 EGD BIOPSY SINGLE/MULTIPLE: CPT | Performed by: INTERNAL MEDICINE

## 2025-06-26 PROCEDURE — 88305 TISSUE EXAM BY PATHOLOGIST: CPT | Performed by: PATHOLOGY

## 2025-06-26 PROCEDURE — 45380 COLONOSCOPY AND BIOPSY: CPT | Performed by: INTERNAL MEDICINE

## 2025-06-26 RX ORDER — LIDOCAINE HYDROCHLORIDE 20 MG/ML
INJECTION, SOLUTION EPIDURAL; INFILTRATION; INTRACAUDAL; PERINEURAL AS NEEDED
Status: DISCONTINUED | OUTPATIENT
Start: 2025-06-26 | End: 2025-06-26

## 2025-06-26 RX ORDER — OMEPRAZOLE 40 MG/1
40 CAPSULE, DELAYED RELEASE ORAL DAILY
Qty: 90 CAPSULE | Refills: 0 | Status: SHIPPED | OUTPATIENT
Start: 2025-06-26

## 2025-06-26 RX ORDER — SODIUM CHLORIDE, SODIUM LACTATE, POTASSIUM CHLORIDE, CALCIUM CHLORIDE 600; 310; 30; 20 MG/100ML; MG/100ML; MG/100ML; MG/100ML
125 INJECTION, SOLUTION INTRAVENOUS CONTINUOUS
Status: SHIPPED | OUTPATIENT
Start: 2025-06-26

## 2025-06-26 RX ORDER — PROPOFOL 10 MG/ML
INJECTION, EMULSION INTRAVENOUS AS NEEDED
Status: DISCONTINUED | OUTPATIENT
Start: 2025-06-26 | End: 2025-06-26

## 2025-06-26 RX ADMIN — PROPOFOL 170 MG: 10 INJECTION, EMULSION INTRAVENOUS at 11:50

## 2025-06-26 RX ADMIN — PROPOFOL 30 MG: 10 INJECTION, EMULSION INTRAVENOUS at 12:16

## 2025-06-26 RX ADMIN — PROPOFOL 30 MG: 10 INJECTION, EMULSION INTRAVENOUS at 11:55

## 2025-06-26 RX ADMIN — PROPOFOL 160 MCG/KG/MIN: 10 INJECTION, EMULSION INTRAVENOUS at 11:51

## 2025-06-26 RX ADMIN — SODIUM CHLORIDE, SODIUM LACTATE, POTASSIUM CHLORIDE, AND CALCIUM CHLORIDE 125 ML/HR: .6; .31; .03; .02 INJECTION, SOLUTION INTRAVENOUS at 11:36

## 2025-06-26 RX ADMIN — LIDOCAINE HYDROCHLORIDE 100 MG: 20 INJECTION, SOLUTION EPIDURAL; INFILTRATION; INTRACAUDAL at 11:50

## 2025-06-26 RX ADMIN — PROPOFOL 30 MG: 10 INJECTION, EMULSION INTRAVENOUS at 12:04

## 2025-06-26 NOTE — ANESTHESIA POSTPROCEDURE EVALUATION
Post-Op Assessment Note    CV Status:  Stable    Pain management: adequate       Mental Status:  Alert and awake   Hydration Status:  Euvolemic   PONV Controlled:  Controlled   Airway Patency:  Patent     Post Op Vitals Reviewed: Yes    No anethesia notable event occurred.    Staff: CRNA           Last Filed PACU Vitals:  Vitals Value Taken Time   Temp     Pulse 76 06/26/25 12:21   /59 06/26/25 12:21   Resp 20 06/26/25 12:21   SpO2 100 % 06/26/25 12:21

## 2025-06-26 NOTE — INTERVAL H&P NOTE
H&P reviewed. After examining the patient I find no changes in the patients condition since the H&P had been written.    Vitals:    06/26/25 1122   BP: 138/67   Pulse: 68   Resp: 18   Temp: (!) 97.3 °F (36.3 °C)   SpO2: 100%

## 2025-06-26 NOTE — ANESTHESIA PREPROCEDURE EVALUATION
Procedure:  COLONOSCOPY  EGD    Relevant Problems   CARDIO   (+) Premature atrial contractions      MUSCULOSKELETAL   (+) Chronic bilateral low back pain without sciatica   (+) Primary osteoarthritis of both first carpometacarpal joints      NEURO/PSYCH   (+) Anxiety   (+) Chronic bilateral low back pain without sciatica        Physical Exam    Airway     Mallampati score: I          Cardiovascular      Dental       Pulmonary      Neurological      Other Findings  post-pubertal.      Anesthesia Plan  ASA Score- 2     Anesthesia Type- IV sedation with anesthesia with ASA Monitors.         Additional Monitors:     Airway Plan:            Plan Factors-    Chart reviewed.                      Induction- intravenous.    Postoperative Plan- .   Monitoring Plan - Monitoring plan - standard ASA monitoring          Informed Consent- Anesthetic plan and risks discussed with patient.  I personally reviewed this patient with the CRNA. Discussed and agreed on the Anesthesia Plan with the CRNA..      NPO Status:  No vitals data found for the desired time range.

## 2025-06-27 ENCOUNTER — NURSE TRIAGE (OUTPATIENT)
Age: 45
End: 2025-06-27

## 2025-06-27 NOTE — TELEPHONE ENCOUNTER
"REASON FOR CONVERSATION: No chief complaint on file.    SYMPTOMS: Heartburn, sore throat and sensation of lump in throat since EGD/colon yesterday.  Symptoms are worse with belching.    OTHER HEALTH INFORMATION: EGD/Colonoscopy on 6/26/25. Pt states she is not taking any meds for reflux, never had it before.     PROTOCOL DISPOSITION: No disposition on file.    CARE ADVICE PROVIDED: Advised to increase fluids, use cough drops, and can try OTC antacid like Mylicon or equivalent.   Pt verbalizes understanding.     PRACTICE FOLLOW-UP: None- Just FYI-Home care given      Reason for Disposition   [1] MILD sore throat or hoarse voice AND [2] lasts more than 3 days after endoscopy    Answer Assessment - Initial Assessment Questions  1. DATE/TIME: \"When did you have your endoscopy?\"       Yesterday   2. MAIN CONCERN: \"What is your main concern right now?\" \"What questions do you have?\"      Heartburn and nausea, sensation of lump in throat/sore throat, worse with belching  3. ADOMINAL PAIN: \"Are you having any abdominal (belly or stomach) pain?\" If Yes, ask: \"How bad is it?\" (e.g., Scale 1-10; mild, moderate, severe).     - MILD (1-3): doesn't interfere with normal activities, abdomen soft and not tender to touch      - MODERATE (4-7): interferes with normal activities or awakens from sleep, tender to touch      - SEVERE (8-10): excruciating pain, doubled over, unable to do any normal activities        No   4. OTHER SYMPTOMS: \"What other symptoms are you having?\" (e.g., breathing or swallowing problems, chest pain, throat pain, hoarseness, vomiting, stomach pain, bloating, fever, dizziness)      None  5. ONSET: \"When did your symptoms start?\"      Yesterday    Protocols used: GI-Endoscopy (Upper GI) Symptoms and Questions-ADULT-[place]    "

## 2025-06-30 PROCEDURE — 88305 TISSUE EXAM BY PATHOLOGIST: CPT | Performed by: PATHOLOGY

## 2025-07-07 ENCOUNTER — TELEPHONE (OUTPATIENT)
Age: 45
End: 2025-07-07

## 2025-07-07 ENCOUNTER — PROCEDURE VISIT (OUTPATIENT)
Dept: UROLOGY | Facility: MEDICAL CENTER | Age: 45
End: 2025-07-07
Payer: COMMERCIAL

## 2025-07-07 VITALS
HEIGHT: 65 IN | WEIGHT: 141 LBS | BODY MASS INDEX: 23.49 KG/M2 | DIASTOLIC BLOOD PRESSURE: 60 MMHG | SYSTOLIC BLOOD PRESSURE: 100 MMHG | OXYGEN SATURATION: 99 % | HEART RATE: 80 BPM

## 2025-07-07 DIAGNOSIS — N32.89 BLADDER SPASM: ICD-10-CM

## 2025-07-07 DIAGNOSIS — N30.90 CYSTITIS: Primary | ICD-10-CM

## 2025-07-07 DIAGNOSIS — R39.14 FEELING OF INCOMPLETE BLADDER EMPTYING: ICD-10-CM

## 2025-07-07 LAB
SL AMB  POCT GLUCOSE, UA: ABNORMAL
SL AMB LEUKOCYTE ESTERASE,UA: ABNORMAL
SL AMB POCT BILIRUBIN,UA: ABNORMAL
SL AMB POCT BLOOD,UA: ABNORMAL
SL AMB POCT CLARITY,UA: CLEAR
SL AMB POCT COLOR,UA: YELLOW
SL AMB POCT KETONES,UA: ABNORMAL
SL AMB POCT NITRITE,UA: ABNORMAL
SL AMB POCT PH,UA: 5.5
SL AMB POCT SPECIFIC GRAVITY,UA: 1.02
SL AMB POCT URINE PROTEIN: ABNORMAL
SL AMB POCT UROBILINOGEN: 0.2

## 2025-07-07 PROCEDURE — 52000 CYSTOURETHROSCOPY: CPT | Performed by: UROLOGY

## 2025-07-07 PROCEDURE — 81003 URINALYSIS AUTO W/O SCOPE: CPT | Performed by: UROLOGY

## 2025-07-07 RX ORDER — ESTRADIOL 0.1 MG/G
1 CREAM VAGINAL 3 TIMES WEEKLY
Qty: 42.5 G | Refills: 2 | Status: SHIPPED | OUTPATIENT
Start: 2025-07-07

## 2025-07-07 NOTE — TELEPHONE ENCOUNTER
Patient called and asked if there was any prep for cysto today. I told her just to make sure she's there at least 15 minutes prior and come in with a comfortable full bladder to give us a urine sample. She mentioned she just got her period today but is light. I checked with RNMaria D Glaser and she said it's okay for patient to keep appointment even with her period. Patient informed. No further action required.

## 2025-07-07 NOTE — PATIENT INSTRUCTIONS
Patient Education     Estradiol (Systemic) (es tra DYE ole)   Brand Names: [place] Maribell; Climara; Delestrogen; Depo-Estradiol; Divigel; Deya; Elestrin; Estrace; Estrogel; Evamist; Femring; Lyllana; Menostar; Minivelle; Vivelle-Dot   Brand Names: [place] Climara 25; Climara 50; Climara 75; Divigel; Estradot 100; Estradot 25; Estradot 37.5; Estradot 50; Estradot 75; Estrogel; Lupin-Estradiol; Oesclim; PMS-Estradiol; SANDOZ Estradiol Derm 100; SANDOZ Estradiol Derm 50; SANDOZ Estradiol Derm 75   Warning   All products:   Do not use this drug to prevent heart disease or dementia. A study of women taking an estrogen with a progestin showed a raised chance of heart attack, stroke, blood clot, breast cancer, and dementia. The chance of stroke, blood clot, and dementia was also raised when the estrogen was taken alone. Not all products and doses were studied. It is not known if the same effects may happen with this drug.  The chance of endometrial cancer may be raised with the use of estrogen alone in patients with a uterus. Use of a progestin along with estrogen may lower the risk. Call your doctor right away if you have unexplained or long-lasting vaginal bleeding.  Use this drug for the shortest time needed at the lowest useful dose. Your doctor will talk with you on a regular basis to see if you need to keep taking this drug.  Skin gel and spray:   Do not let another person or a pet touch this drug or the treated part of your skin. If they do, wash their skin with soap and water. If you notice any signs that others may have been in contact with the treated area by accident, call the doctor. This may include signs of unusual sexual development like breast changes in children.  What is this drug used for?   It is used to prevent soft, brittle bones (osteoporosis) after menopause.  It is used to prevent or lower the signs of the change of life (menopause).  It is used to add estrogen to the body when the ovaries have been  taken out or do not work the right way.  Rarely, it is used to treat breast or prostate cancers.  It may be given to you for other reasons. Talk with the doctor.  What do I need to tell my doctor BEFORE I take this drug?   All products:   If you are allergic to this drug; any part of this drug; or any other drugs, foods, or substances. Tell your doctor about the allergy and what signs you had.  If you have had any of these health problems: Bleeding disorder, blood clots, a higher risk of having a blood clot, breast cancer, liver problems or liver tumor, heart attack, stroke, or a tumor where estrogen makes it grow.  If you have unexplained vaginal bleeding.  If you are pregnant or may be pregnant. Do not take this drug if you are pregnant.  Vaginal ring:   If you have any of these health problems: Some vaginal problems like narrow vagina or vaginal infection, uterus not in the normal spot, or other health problems that may raise the chance of vaginal irritation, vaginal ulcers, or make it more likely for the vaginal ring to come out. Ask your doctor if you are not sure.  This is not a list of all drugs or health problems that interact with this drug.  Tell your doctor and pharmacist about all of your drugs (prescription or OTC, natural products, vitamins) and health problems. You must check to make sure that it is safe for you to take this drug with all of your drugs and health problems. Do not start, stop, or change the dose of any drug without checking with your doctor.  What are some things I need to know or do while I take this drug?   All products:   Tell all of your health care providers that you take this drug. This includes your doctors, nurses, pharmacists, and dentists. This drug may need to be stopped before certain types of surgery as your doctor has told you. If this drug is stopped, your doctor will tell you when to start taking this drug again after your surgery or procedure.  Talk with your doctor if  you will need to be still for long periods of time like long trips, bedrest after surgery, or illness. Not moving for long periods may raise your chance of blood clots.  If you are allergic to tartrazine (FD&C Yellow No. 5), talk with your doctor. Some products have tartrazine.  If you have high blood sugar (diabetes), you will need to watch your blood sugar closely.  High blood pressure has happened with drugs like this one. Have your blood pressure checked as you have been told by your doctor.  Have your blood work and bone density checked as you have been told by your doctor.  Be sure to have regular breast exams and gynecology check-ups. You will also need to do breast self-exams as you have been told.  The risk of certain side effects like heart attack, stroke, breast cancer, ovarian cancer, and others may not be the same for everyone. Factors like how long an estrogen is taken, if it is taken with or without a progestin, and other factors may affect the risk for certain side effects. Talk with your doctor about the benefits and risks of using this drug.  High triglyceride levels have happened with this drug. Tell your doctor if you have ever had high triglyceride levels.  This drug may cause dark patches of skin on your face. Avoid sun, sunlamps, and tanning beds. Use sunscreen and wear clothing and eyewear that protects you from the sun.  This drug may affect certain lab tests. Tell all of your health care providers and lab workers that you take this drug.  Do not smoke. Smoking raises the chance of heart disease. Talk with your doctor.  Limit your drinking of alcohol.  If you drink grapefruit juice or eat grapefruit often, talk with your doctor.  This drug may affect growth in children and teens in some cases. They may need regular growth checks. Talk with the doctor.  If you are 65 or older, use this drug with care. You could have more side effects.  Tell your doctor if you are breast-feeding. You will need  to talk about any risks to your baby.  Soft, brittle bones (osteoporosis):   This drug works best when used with calcium/vitamin D and weight-bearing workouts like walking or PT (physical therapy).  Follow the diet and workout plan that your doctor told you about.  All skin products:   Talk with your doctor before you use other drugs or products on your skin.  Skin gel:   Do not use sunscreen before using gel or soon after using gel.  Do not use sunscreen on gel part for 7 days in a row.  Skin spray:   Check with your doctor about how to use sunscreen with this drug.  If a child touches the spray, she/he needs to wash the skin with soap and water.  Vaginal ring:   Tell your doctor if you have trouble getting the ring out. Sometimes, the ring has gotten stuck to the vaginal wall and surgery was needed.  Contact with blood during use may cause the ring to change colors. This will not affect how well this drug works.  What are some side effects that I need to call my doctor about right away?   WARNING/CAUTION: Even though it may be rare, some people may have very bad and sometimes deadly side effects when taking a drug. Tell your doctor or get medical help right away if you have any of the following signs or symptoms that may be related to a very bad side effect:  All products:   Signs of an allergic reaction, like rash; hives; itching; red, swollen, blistered, or peeling skin with or without fever; wheezing; tightness in the chest or throat; trouble breathing, swallowing, or talking; unusual hoarseness; or swelling of the mouth, face, lips, tongue, or throat.  Signs of liver problems like dark urine, tiredness, decreased appetite, upset stomach or stomach pain, light-colored stools, throwing up, or yellow skin or eyes.  Signs of gallbladder problems like pain in the upper right belly area, right shoulder area, or between the shoulder blades; yellow skin or eyes; fever with chills; bloating; or very upset stomach or  throwing up.  Signs of a pancreas problem (pancreatitis) like very bad stomach pain, very bad back pain, or very bad upset stomach or throwing up.  Signs of high blood pressure like very bad headache or dizziness, passing out, or change in eyesight.  Weakness on 1 side of the body, trouble speaking or thinking, change in balance, drooping on one side of the face, or blurred eyesight.  Eyesight changes or loss, bulging eyes, or change in how contact lenses feel.  A lump in the breast, breast pain or soreness, or nipple discharge.  Vaginal itching or discharge.  Vaginal bleeding that is not normal.  Depression or other mood changes.  Memory problems or loss.  Fever.  Not able to pass urine or change in how much urine is passed.  Pain when passing urine.  This drug may cause you to swell or keep fluid in your body. Tell your doctor if you have swelling, weight gain, or trouble breathing.  Call your doctor right away if you have signs of a blood clot like chest pain or pressure; coughing up blood; shortness of breath; swelling, warmth, numbness, change of color, or pain in a leg or arm; or trouble speaking or swallowing.  High calcium levels have happened with drugs like this one in some people with cancer. Call your doctor right away if you have signs of high calcium levels like weakness, confusion, feeling tired, headache, upset stomach or throwing up, constipation, or bone pain.  Vaginal ring:   Toxic shock syndrome (TSS) has happened in a few patients using vaginal rings. TSS is rare, but can be deadly. Tell your doctor right away if you have diarrhea, dizziness or light-headedness, passing out, fever, muscle pain, upset stomach, throwing up, or a sunburn-like rash.  What are some other side effects of this drug?   All drugs may cause side effects. However, many people have no side effects or only have minor side effects. Call your doctor or get medical help if any of these side effects or any other side effects  bother you or do not go away:  All products:   Dizziness or headache.  Hair loss.  Upset stomach or throwing up.  Constipation.  Stomach pain or cramps.  Bloating.  Enlarged breasts.  Tender breasts.  Vaginal bleeding or spotting.  Painful periods.  Signs of a common cold.  Nose or throat irritation.  Weight gain or loss.  Joint pain.  Leg cramps.  Change in sex interest.  Injection, patch, and vaginal ring:   Irritation where this drug was used.  These are not all of the side effects that may occur. If you have questions about side effects, call your doctor. Call your doctor for medical advice about side effects.  You may report side effects to your national health agency.  You may report side effects to the FDA at 1-951.900.3214. You may also report side effects at https://www.fda.gov/medwatch.  How is this drug best taken?   Use this drug as ordered by your doctor. Read all information given to you. Follow all instructions closely.  Injection:   It is given as a shot into a muscle.  If you will be giving yourself the shot, your doctor or nurse will teach you how to give the shot.  Do not use if the solution is cloudy, leaking, or has particles.  This drug is colorless to a faint yellow. Do not use if the solution changes color.  Throw away needles in a needle/sharp disposal box. Do not reuse needles or other items. When the box is full, follow all local rules for getting rid of it. Talk with a doctor or pharmacist if you have any questions.  Tablets:   Take this drug at the same time of day.  There may be days when you will not take this drug.  Take with or without food. Take with food if it causes an upset stomach.  Skin gel and spray:   Do not take this drug by mouth. Use on your skin only. Keep out of your mouth, nose, and eyes (may burn).  Use this drug at the same time of day.  Wash your hands before and after use.  Put on clean, dry, healthy skin.  Certain products are to be put on certain parts of the  body. Be sure you know where to put this drug. Read the package insert for more details.  Do not use on skin that has any problems.  Do not put on the face, breast, or vagina.  Some products come in pumps. Some products come in packets. If you are using a pump, you will need to prime it before you use it the first time. Prime the pump as you are told in the package insert.  Do not have another person put this drug on you. If someone else does put it on you, be sure they wear gloves and do not touch the drug.  Avoid fire, flames, or smoking until dry.  Let dry before covering with clothing.  After putting this drug on, you will need to wait some time before you bathe, shower, or swim. Be sure you know how long you need to wait. Read the package insert for more details.  Move the site where you put this drug on with each dose.  Skin spray:   Hold container upright and rest plastic cone flat on the skin while spraying.  If your dose is more than 1 spray, move site with each spray as you have been told.  Do not rub into skin.  Skin patch:   Do not take this drug by mouth. Use on your skin only. Keep out of your mouth, nose, and eyes (may burn).  Use this drug at the same time of day.  Wash hands before and after use.  Put patch on clean, dry, healthy skin on the lower belly or upper buttocks. Move the site with each new patch.  Do not put a patch on the same site for at least 7 days.  Do not use on skin that has any problems.  Do not put on the breast.  Do not put on skin where you have just used creams, oils, lotions, powder, or other skin products. The patch may not stick as well.  Put patch on a site without hair.  Do not put the patch on the waistline.  Do not put the patch on a site where sitting will make it come off.  Do not use patches that are cut or do not look right.  If the patch falls off, put it back on. If you cannot put the patch back on, put on a new one in a different area.  Wear only one patch at a  time.  After you take off a skin patch, be sure to fold the sticky sides of the patch to each other. Throw away used patches where children and pets cannot get to them.  Vaginal ring:   Put into the vagina and leave in place for 90 days.  To use, wash your hands and take ring from the pouch.  Be sure your hands are dry before you touch this drug.  Press sides of ring at the same time between thumb and index finger and put folded ring into the vagina.  Perfect placement is not needed for the ring to work. The ring will not hurt.  To take out, hook the index finger around the rim or hold rim between index finger and middle finger and pull out.  Do not throw the vaginal ring in the toilet.  Wash hands after use.  What do I do if I miss a dose?   Injection and vaginal ring:   Call your doctor to find out what to do.  Skin patch:   Put on a missed patch as soon as you think about it after taking off the old one.  Skin gel and spray:   Use a missed dose as soon as you think about it.  If it is less than 12 hours until the next dose, skip the missed dose and go back to your normal time.  Do not use 2 doses at the same time or extra doses.  Tablets:   Take a missed dose as soon as you think about it.  If it is close to the time for your next dose, skip the missed dose and go back to your normal time.  Do not take 2 doses at the same time or extra doses.  How do I store and/or throw out this drug?   Injection:   If you need to store this drug at home, talk with your doctor, nurse, or pharmacist about how to store it.  All other products:   Store at room temperature in a dry place. Do not store in a bathroom.  Skin gel and spray:   Protect from heat or open flame.  If you are using a pump, throw away any part not used after labeled number of doses are used.  Skin patch:   Store in protective pouch until ready for use.  All products:   Keep all drugs in a safe place. Keep all drugs out of the reach of children and pets.  Throw  away unused or  drugs. Do not flush down a toilet or pour down a drain unless you are told to do so. Check with your pharmacist if you have questions about the best way to throw out drugs. There may be drug take-back programs in your area.  General drug facts   If your symptoms or health problems do not get better or if they become worse, call your doctor.  Do not share your drugs with others and do not take anyone else's drugs.  Some drugs may have another patient information leaflet. If you have any questions about this drug, please talk with your doctor, nurse, pharmacist, or other health care provider.  Some drugs may have another patient information leaflet. Check with your pharmacist. If you have any questions about this drug, please talk with your doctor, nurse, pharmacist, or other health care provider.  If you think there has been an overdose, call your poison control center or get medical care right away. Be ready to tell or show what was taken, how much, and when it happened.  Consumer Information Use and Disclaimer   This generalized information is a limited summary of diagnosis, treatment, and/or medication information. It is not meant to be comprehensive and should be used as a tool to help the user understand and/or assess potential diagnostic and treatment options. It does NOT include all information about conditions, treatments, medications, side effects, or risks that may apply to a specific patient. It is not intended to be medical advice or a substitute for the medical advice, diagnosis, or treatment of a health care provider based on the health care provider's examination and assessment of a patient's specific and unique circumstances. Patients must speak with a health care provider for complete information about their health, medical questions, and treatment options, including any risks or benefits regarding use of medications. This information does not endorse any treatments or  "medications as safe, effective, or approved for treating a specific patient. Up[employer]Date, Inc[employer] and its affiliates disclaim any warranty or liability relating to this information or the use thereof. The use of this information is governed by the Terms of Use, available at https://www.[employer].com/en/know/clinical-effectiveness-terms.  Last Reviewed Date   2024-03-19  Copyright   © 2024 UpToDate, Inc. and its affiliates and/or licensors. All rights reserved.    Patient Education     Cystoscopy - Discharge instructions   The Basics   Written by the doctors and editors at [employer]   What are discharge instructions? -- Discharge instructions are information about how to take care of yourself after getting medical care for a health problem.  What is a cystoscopy? -- A cystoscopy is a procedure that lets a doctor see inside the bladder and urethra. The urethra is the tube that transports urine out of the bladder (figure 1). During cystoscopy, a doctor puts a thin tube with a tiny camera on the end into the urethra and moves it up into the bladder (figure 2). The tube is called a \"cystoscope.\"  How do I care for myself at home? -- Ask the doctor or nurse what you should do when you go home. Make sure that you understand exactly what you need to do to care for yourself. Ask questions if there is anything you do not understand.  It's important to drink plenty of water:   This helps flush out your bladder and relieves discomfort.   Drink at least 3 to 4 to glasses of water the first day after your procedure.   Urinate when you feel the need.  Some side effects are common after cystoscopy. These should only last for a short time after your procedure. They include:   Some pain or discomfort when urinating   A small amount of blood in your urine  These side effects should stop after you urinate a few times.  What follow-up care do I need? -- Your doctor or nurse will discuss the results of your cystoscopy with you.   If you had " "a biopsy, your doctor will let you know when your results are ready. They will talk to you about what they mean.   If your cystoscopy shows that you have a medical problem, your doctor will talk to you about your treatment options.  When should I call the doctor? -- Call for advice if you:   Have pain or discomfort when urinating for more than a day or 2   Have \"urinary urgency\" for more than a day or 2 - This is when you feel like you need to urinate suddenly or in a hurry.   See a lot of blood in your urine at once   Still see blood in your urine after 5 days   Have a fever   Have pain in your belly  All topics are updated as new evidence becomes available and our peer review process is complete.  This topic retrieved from [employer] on: Apr 11, 2024.  Topic 902694 Version 1.0  Release: 32.3.2 - C32.100  © 2024 UpToDate, Inc. and/or its affiliates. All rights reserved.  figure 1: Anatomy of the urinary tract     Urine is made by the kidneys. It passes from the kidneys into the bladder through 2 tubes called the ureters. Then, it leaves the bladder through another tube called the urethra.  Graphic 67920 Version 8.0  figure 2: Cystoscopy     The doctor inserts a long, thin tube with a tiny camera on the end (cystoscope) into the urethra. Then, they thread the tube into the bladder to see inside.  Graphic 831882 Version 1.0  Consumer Information Use and Disclaimer   Disclaimer: This generalized information is a limited summary of diagnosis, treatment, and/or medication information. It is not meant to be comprehensive and should be used as a tool to help the user understand and/or assess potential diagnostic and treatment options. It does NOT include all information about conditions, treatments, medications, side effects, or risks that may apply to a specific patient. It is not intended to be medical advice or a substitute for the medical advice, diagnosis, or treatment of a health care provider based on the health care " provider's examination and assessment of a patient's specific and unique circumstances. Patients must speak with a health care provider for complete information about their health, medical questions, and treatment options, including any risks or benefits regarding use of medications. This information does not endorse any treatments or medications as safe, effective, or approved for treating a specific patient. Up[employer]Date, Inc. and its affiliates disclaim any warranty or liability relating to this information or the use thereof.The use of this information is governed by the Terms of Use, available at https://www.[employer].com/en/know/clinical-effectiveness-terms. 2024© UpToDate, Inc. and its affiliates and/or licensors. All rights reserved.  Copyright   © 2024 UpToDate, Inc. and/or its affiliates. All rights reserved.     How Severe Is Your Skin Lesion?: mild Have Your Skin Lesions Been Treated?: not been treated Is This A New Presentation, Or A Follow-Up?: Growths Additional History: Pt had lipoma removed in the past and has more sites present today. Pt would like to discuss possibility of removal

## 2025-07-07 NOTE — PROGRESS NOTES
Cystoscopy     Date/Time  7/7/2025 2:30 PM     Performed by  Jsutino Choi MD   Authorized by  Justino Choi MD       Universal Protocol:  Consent: Verbal consent obtained. Written consent obtained  Risks and benefits: risks, benefits and alternatives were discussed  Consent given by: patient  Patient understanding: patient states understanding of the procedure being performed  Patient consent: the patient's understanding of the procedure matches consent given  Procedure consent: procedure consent matches procedure scheduled  Required items: required blood products, implants, devices, and special equipment available  Patient identity confirmed: verbally with patient      Procedure Details:  Procedure type: cystoscopy    Patient tolerance: Patient tolerated the procedure well with no immediate complications    Additional Procedure Details: Cystoscopy Procedure Note        Pre-operative Diagnosis: Voiding dysfunction    Post-operative Diagnosis: unremarkable cystoscopy        Procedure Details   The risks, benefits, complications, treatment options, and expected outcomes were discussed with the patient. The patient concurred with the proposed plan, giving informed consent.    Cystoscopy was performed today under local anesthesia, using sterile technique. The patient was placed in the lithotomy  position, prepped and draped in the usual sterile fashion. A 15 Monegasque flexible cystoscope  was used to inspect both the urethra and bladder.  Findings: normal appearing bladder. Good capacity- about 500cc. Normal orifices. No squamous metaplasia on trigone. Mild urethritis. No mucosal lesions.     Specimens: tr blood on dipstick       Discussion:bladder is unremarkable on cystoscopy. I recommended she continue myrbetriq. I will place a referral to pelvic floor rehab. She will also try estrogen cream empirically as the trigone did not show any estrogen effect.

## 2025-07-07 NOTE — LETTER
July 7, 2025     SWETA Church  [address]  [address]  [facility] [address]    Patient: Megan Richmond   YOB: 1980   Date of Visit: 7/7/2025       Dear SWETA Mancini:    Thank you for referring Megan Richmond to me for evaluation. Below are my notes for this consultation.    If you have questions, please do not hesitate to call me. I look forward to following your patient along with you.         Sincerely,        Justino Choi MD        CC: No Recipients    Justino Choi MD  7/7/2025  3:14 PM  Sign when Signing Visit     Cystoscopy     Date/Time  7/7/2025 2:30 PM     Performed by  Justino Choi MD   Authorized by  Justino Choi MD       Universal Protocol:  Consent: Verbal consent obtained. Written consent obtained  Risks and benefits: risks, benefits and alternatives were discussed  Consent given by: patient  Patient understanding: patient states understanding of the procedure being performed  Patient consent: the patient's understanding of the procedure matches consent given  Procedure consent: procedure consent matches procedure scheduled  Required items: required blood products, implants, devices, and special equipment available  Patient identity confirmed: verbally with patient      Procedure Details:  Procedure type: cystoscopy    Patient tolerance: Patient tolerated the procedure well with no immediate complications    Additional Procedure Details: Cystoscopy Procedure Note        Pre-operative Diagnosis: Voiding dysfunction    Post-operative Diagnosis: unremarkable cystoscopy        Procedure Details   The risks, benefits, complications, treatment options, and expected outcomes were discussed with the patient. The patient concurred with the proposed plan, giving informed consent.    Cystoscopy was performed today under local anesthesia, using sterile technique. The patient was placed in the lithotomy  position, prepped and draped in the usual sterile fashion. A 15  Slovak flexible cystoscope  was used to inspect both the urethra and bladder.  Findings: normal appearing bladder. Good capacity- about 500cc. Normal orifices. No squamous metaplasia on trigone. Mild urethritis. No mucosal lesions.     Specimens: tr blood on dipstick       Discussion:bladder is unremarkable on cystoscopy. I recommended she continue myrbetriq. I will place a referral to pelvic floor rehab. She will also try estrogen cream empirically as the trigone did not show any estrogen effect.

## 2025-07-11 ENCOUNTER — TELEPHONE (OUTPATIENT)
Dept: GASTROENTEROLOGY | Facility: MEDICAL CENTER | Age: 45
End: 2025-07-11

## 2025-07-11 ENCOUNTER — OFFICE VISIT (OUTPATIENT)
Dept: GASTROENTEROLOGY | Facility: MEDICAL CENTER | Age: 45
End: 2025-07-11
Payer: COMMERCIAL

## 2025-07-11 VITALS
BODY MASS INDEX: 24.06 KG/M2 | DIASTOLIC BLOOD PRESSURE: 74 MMHG | SYSTOLIC BLOOD PRESSURE: 119 MMHG | TEMPERATURE: 98 F | HEART RATE: 64 BPM | WEIGHT: 144.6 LBS

## 2025-07-11 DIAGNOSIS — K20.80 LOS ANGELES GRADE C ESOPHAGITIS: ICD-10-CM

## 2025-07-11 DIAGNOSIS — R13.10 DYSPHAGIA, UNSPECIFIED TYPE: Primary | ICD-10-CM

## 2025-07-11 PROCEDURE — 99214 OFFICE O/P EST MOD 30 MIN: CPT | Performed by: PHYSICIAN ASSISTANT

## 2025-07-11 RX ORDER — SODIUM CHLORIDE, SODIUM LACTATE, POTASSIUM CHLORIDE, CALCIUM CHLORIDE 600; 310; 30; 20 MG/100ML; MG/100ML; MG/100ML; MG/100ML
125 INJECTION, SOLUTION INTRAVENOUS CONTINUOUS
OUTPATIENT
Start: 2025-07-11

## 2025-07-11 NOTE — TELEPHONE ENCOUNTER
Procedure: Esophageal manometry 24 hr pH  Date: 7/24/25  Location of procedure:  [facility]  Instructions given to patient: Yes  Diabetic: No  Clearances: N/A

## 2025-07-15 ENCOUNTER — TELEPHONE (OUTPATIENT)
Dept: GASTROENTEROLOGY | Facility: HOSPITAL | Age: 45
End: 2025-07-15

## 2025-07-17 ENCOUNTER — TELEPHONE (OUTPATIENT)
Dept: GASTROENTEROLOGY | Facility: HOSPITAL | Age: 45
End: 2025-07-17

## 2025-07-19 DIAGNOSIS — J30.2 SEASONAL ALLERGIES: ICD-10-CM

## 2025-07-21 RX ORDER — IPRATROPIUM BROMIDE 21 UG/1
2 SPRAY, METERED NASAL EVERY 12 HOURS
Qty: 30 ML | Refills: 0 | Status: SHIPPED | OUTPATIENT
Start: 2025-07-21

## 2025-07-24 ENCOUNTER — HOSPITAL ENCOUNTER (OUTPATIENT)
Dept: GASTROENTEROLOGY | Facility: HOSPITAL | Age: 45
End: 2025-07-24
Attending: PHYSICIAN ASSISTANT
Payer: COMMERCIAL

## 2025-07-24 VITALS
TEMPERATURE: 98.4 F | OXYGEN SATURATION: 98 % | HEART RATE: 75 BPM | DIASTOLIC BLOOD PRESSURE: 66 MMHG | RESPIRATION RATE: 16 BRPM | SYSTOLIC BLOOD PRESSURE: 113 MMHG

## 2025-07-24 DIAGNOSIS — K20.80 LOS ANGELES GRADE C ESOPHAGITIS: ICD-10-CM

## 2025-07-24 DIAGNOSIS — R13.10 DYSPHAGIA, UNSPECIFIED TYPE: ICD-10-CM

## 2025-07-24 PROCEDURE — 91038 ESOPH IMPED FUNCT TEST > 1HR: CPT | Performed by: INTERNAL MEDICINE

## 2025-07-24 PROCEDURE — 91038 ESOPH IMPED FUNCT TEST > 1HR: CPT

## 2025-07-24 PROCEDURE — 91010 ESOPHAGUS MOTILITY STUDY: CPT

## 2025-07-24 NOTE — PERIOPERATIVE NURSING NOTE
Patient brought in the room and explained the esophageal manometry procedure. After the confirmation of allergies, Lidocaine 2% viscous solution given via right/ left nostrils and  a transnasal insertion of the High Resolution esophageal manometry catheter was inserted via right nostril. Patient given water to drink during the insertion and once the catheter inserted pressure bands of both Upper esophageal sphincter  (UES) and Lower esophageal sphincter ( LES) were observed on the color contour. Patient instructed to take a deep breath to verify placement of the catheter, diaphragmatic pinch noted on inspiration. Catheter was secured to right cheek. Patient was assisted to supine position .Patient was instructed to relax  while acclimating the catheter for about 5 minutes. A 30 second baseline resting pressure was obtained to identify the UES and LES. Patient assisted to the left side and given  a series of 10  wet liquid swallows using 5 cc room temperature normal saline to assess esophageal motility and bolus transit. Patient administered 10 wet viscous swallows using 5 cc viscous solution, 1 multiple rapid drink swallow using 2 cc room temperature normal saline given a total of 5 drinks. Patient instructed to sit up at the edge of the stretcher and given 5 upright liquid swallows using 5 cc room temperature normal saline and 1 rapid drink challenge using 200 cc room temperature water.Catheter withdrawn to 39 cm and 5 UES swallows done upright using 5 cc room temperature normal saline.  At the end of the procedure the high resolution esophageal manometry catheter was removed from the nostril intact. sensor PH probe inserted via left nostril and secured. Zephr recorder teachback performed and patient verbalized understanding. Patient reporting she feels the catheter is coiled, Readjusted and withdrawn and reinserted to 35.5 cm left nostril. Patient reporting a little improvement. Catheter while inserting did not  feel any resistance. Patient instructed to return next day to have probe remove. Discharge instructions given and patient ambulated out of room in stable condition.

## 2025-07-27 DIAGNOSIS — M35.9 UNDIFFERENTIATED CONNECTIVE TISSUE DISEASE (HCC): ICD-10-CM

## 2025-07-28 RX ORDER — HYDROXYCHLOROQUINE SULFATE 200 MG/1
TABLET, FILM COATED ORAL
Qty: 60 TABLET | Refills: 0 | OUTPATIENT
Start: 2025-07-28 | End: 2026-01-25

## 2025-07-29 ENCOUNTER — EVALUATION (OUTPATIENT)
Dept: PHYSICAL THERAPY | Facility: CLINIC | Age: 45
End: 2025-07-29
Attending: UROLOGY
Payer: COMMERCIAL

## 2025-07-29 DIAGNOSIS — R39.14 FEELING OF INCOMPLETE BLADDER EMPTYING: ICD-10-CM

## 2025-07-29 DIAGNOSIS — N32.89 BLADDER SPASM: Primary | ICD-10-CM

## 2025-07-29 PROCEDURE — 97161 PT EVAL LOW COMPLEX 20 MIN: CPT

## 2025-07-29 PROCEDURE — 97110 THERAPEUTIC EXERCISES: CPT

## 2025-07-31 PROCEDURE — 91010 ESOPHAGUS MOTILITY STUDY: CPT | Performed by: INTERNAL MEDICINE

## 2025-08-01 ENCOUNTER — RESULTS FOLLOW-UP (OUTPATIENT)
Dept: GASTROENTEROLOGY | Facility: MEDICAL CENTER | Age: 45
End: 2025-08-01

## 2025-08-07 ENCOUNTER — TELEPHONE (OUTPATIENT)
Dept: GASTROENTEROLOGY | Facility: MEDICAL CENTER | Age: 45
End: 2025-08-07

## 2025-08-08 DIAGNOSIS — R13.10 DYSPHAGIA, UNSPECIFIED TYPE: ICD-10-CM

## 2025-08-08 DIAGNOSIS — R09.A2 GLOBUS SENSATION: Primary | ICD-10-CM

## 2025-08-13 ENCOUNTER — TELEPHONE (OUTPATIENT)
Age: 45
End: 2025-08-13